# Patient Record
Sex: MALE | Race: WHITE | Employment: OTHER | URBAN - METROPOLITAN AREA
[De-identification: names, ages, dates, MRNs, and addresses within clinical notes are randomized per-mention and may not be internally consistent; named-entity substitution may affect disease eponyms.]

---

## 2017-10-05 ENCOUNTER — HOME HEALTH ADMISSION (OUTPATIENT)
Dept: HOME HEALTH SERVICES | Facility: HOME HEALTH | Age: 67
End: 2017-10-05
Payer: MEDICARE

## 2017-10-07 ENCOUNTER — HOME CARE VISIT (OUTPATIENT)
Dept: HOME HEALTH SERVICES | Facility: HOME HEALTH | Age: 67
End: 2017-10-07

## 2017-10-10 ENCOUNTER — HOME CARE VISIT (OUTPATIENT)
Dept: SCHEDULING | Facility: HOME HEALTH | Age: 67
End: 2017-10-10
Payer: MEDICARE

## 2017-10-10 VITALS
HEART RATE: 63 BPM | OXYGEN SATURATION: 95 % | DIASTOLIC BLOOD PRESSURE: 67 MMHG | RESPIRATION RATE: 16 BRPM | SYSTOLIC BLOOD PRESSURE: 136 MMHG

## 2017-10-10 PROCEDURE — 400013 HH SOC

## 2017-10-10 PROCEDURE — 3331090001 HH PPS REVENUE CREDIT

## 2017-10-10 PROCEDURE — G0151 HHCP-SERV OF PT,EA 15 MIN: HCPCS

## 2017-10-10 PROCEDURE — 3331090002 HH PPS REVENUE DEBIT

## 2017-10-11 ENCOUNTER — HOME CARE VISIT (OUTPATIENT)
Dept: SCHEDULING | Facility: HOME HEALTH | Age: 67
End: 2017-10-11
Payer: MEDICARE

## 2017-10-11 VITALS
OXYGEN SATURATION: 94 % | SYSTOLIC BLOOD PRESSURE: 128 MMHG | TEMPERATURE: 98.2 F | HEART RATE: 72 BPM | RESPIRATION RATE: 16 BRPM | DIASTOLIC BLOOD PRESSURE: 68 MMHG

## 2017-10-11 PROCEDURE — 3331090002 HH PPS REVENUE DEBIT

## 2017-10-11 PROCEDURE — G0153 HHCP-SVS OF S/L PATH,EA 15MN: HCPCS

## 2017-10-11 PROCEDURE — 3331090001 HH PPS REVENUE CREDIT

## 2017-10-12 ENCOUNTER — HOME CARE VISIT (OUTPATIENT)
Dept: SCHEDULING | Facility: HOME HEALTH | Age: 67
End: 2017-10-12
Payer: MEDICARE

## 2017-10-12 PROCEDURE — 3331090002 HH PPS REVENUE DEBIT

## 2017-10-12 PROCEDURE — 3331090001 HH PPS REVENUE CREDIT

## 2017-10-12 PROCEDURE — G0157 HHC PT ASSISTANT EA 15: HCPCS

## 2017-10-13 ENCOUNTER — HOME CARE VISIT (OUTPATIENT)
Dept: SCHEDULING | Facility: HOME HEALTH | Age: 67
End: 2017-10-13
Payer: MEDICARE

## 2017-10-13 VITALS
SYSTOLIC BLOOD PRESSURE: 120 MMHG | OXYGEN SATURATION: 97 % | TEMPERATURE: 96.5 F | RESPIRATION RATE: 18 BRPM | HEART RATE: 68 BPM | DIASTOLIC BLOOD PRESSURE: 72 MMHG

## 2017-10-13 PROCEDURE — 3331090001 HH PPS REVENUE CREDIT

## 2017-10-13 PROCEDURE — 3331090002 HH PPS REVENUE DEBIT

## 2017-10-13 PROCEDURE — G0152 HHCP-SERV OF OT,EA 15 MIN: HCPCS

## 2017-10-14 PROCEDURE — 3331090002 HH PPS REVENUE DEBIT

## 2017-10-14 PROCEDURE — 3331090001 HH PPS REVENUE CREDIT

## 2017-10-15 VITALS
RESPIRATION RATE: 16 BRPM | SYSTOLIC BLOOD PRESSURE: 132 MMHG | DIASTOLIC BLOOD PRESSURE: 78 MMHG | TEMPERATURE: 97.2 F | HEART RATE: 78 BPM

## 2017-10-15 PROCEDURE — 3331090001 HH PPS REVENUE CREDIT

## 2017-10-15 PROCEDURE — 3331090002 HH PPS REVENUE DEBIT

## 2017-10-16 ENCOUNTER — HOME CARE VISIT (OUTPATIENT)
Dept: SCHEDULING | Facility: HOME HEALTH | Age: 67
End: 2017-10-16
Payer: MEDICARE

## 2017-10-16 VITALS
TEMPERATURE: 98.2 F | OXYGEN SATURATION: 97 % | SYSTOLIC BLOOD PRESSURE: 132 MMHG | DIASTOLIC BLOOD PRESSURE: 70 MMHG | RESPIRATION RATE: 16 BRPM | HEART RATE: 72 BPM

## 2017-10-16 PROBLEM — E78.2 MIXED HYPERLIPIDEMIA: Status: ACTIVE | Noted: 2017-09-06

## 2017-10-16 PROBLEM — B37.0 ORAL CANDIDIASIS: Status: ACTIVE | Noted: 2017-09-13

## 2017-10-16 PROBLEM — E11.00 UNCONTROLLED TYPE 2 DIABETES MELLITUS WITH HYPEROSMOLARITY WITHOUT COMA, WITH LONG-TERM CURRENT USE OF INSULIN (HCC): Status: ACTIVE | Noted: 2017-10-16

## 2017-10-16 PROBLEM — I69.391 DYSPHAGIA DUE TO RECENT STROKE: Status: ACTIVE | Noted: 2017-09-08

## 2017-10-16 PROBLEM — R47.1 DYSARTHRIA: Status: ACTIVE | Noted: 2017-09-05

## 2017-10-16 PROBLEM — I63.9 ISCHEMIC STROKE (HCC): Status: ACTIVE | Noted: 2017-10-16

## 2017-10-16 PROBLEM — I63.9 CVA (CEREBRAL VASCULAR ACCIDENT) (HCC): Status: ACTIVE | Noted: 2017-09-05

## 2017-10-16 PROBLEM — I10 ESSENTIAL HYPERTENSION: Status: ACTIVE | Noted: 2017-09-05

## 2017-10-16 PROBLEM — Z79.4 UNCONTROLLED TYPE 2 DIABETES MELLITUS WITH HYPEROSMOLARITY WITHOUT COMA, WITH LONG-TERM CURRENT USE OF INSULIN (HCC): Status: ACTIVE | Noted: 2017-10-16

## 2017-10-16 PROBLEM — F48.2 PSEUDOBULBAR AFFECT: Status: ACTIVE | Noted: 2017-09-06

## 2017-10-16 PROBLEM — E11.65 TYPE 2 DIABETES MELLITUS WITH HYPERGLYCEMIA, WITHOUT LONG-TERM CURRENT USE OF INSULIN (HCC): Status: ACTIVE | Noted: 2017-09-05

## 2017-10-16 PROCEDURE — 3331090002 HH PPS REVENUE DEBIT

## 2017-10-16 PROCEDURE — G0153 HHCP-SVS OF S/L PATH,EA 15MN: HCPCS

## 2017-10-16 PROCEDURE — 3331090001 HH PPS REVENUE CREDIT

## 2017-10-17 ENCOUNTER — HOME CARE VISIT (OUTPATIENT)
Dept: SCHEDULING | Facility: HOME HEALTH | Age: 67
End: 2017-10-17
Payer: MEDICARE

## 2017-10-17 VITALS
TEMPERATURE: 98.5 F | DIASTOLIC BLOOD PRESSURE: 89 MMHG | SYSTOLIC BLOOD PRESSURE: 130 MMHG | OXYGEN SATURATION: 97 % | HEART RATE: 64 BPM | RESPIRATION RATE: 18 BRPM

## 2017-10-17 PROCEDURE — 3331090002 HH PPS REVENUE DEBIT

## 2017-10-17 PROCEDURE — G0157 HHC PT ASSISTANT EA 15: HCPCS

## 2017-10-17 PROCEDURE — 3331090001 HH PPS REVENUE CREDIT

## 2017-10-17 PROCEDURE — G0158 HHC OT ASSISTANT EA 15: HCPCS

## 2017-10-18 ENCOUNTER — HOME CARE VISIT (OUTPATIENT)
Dept: SCHEDULING | Facility: HOME HEALTH | Age: 67
End: 2017-10-18
Payer: MEDICARE

## 2017-10-18 VITALS
SYSTOLIC BLOOD PRESSURE: 124 MMHG | TEMPERATURE: 98.4 F | RESPIRATION RATE: 18 BRPM | DIASTOLIC BLOOD PRESSURE: 76 MMHG | HEART RATE: 76 BPM | OXYGEN SATURATION: 96 %

## 2017-10-18 PROCEDURE — G0153 HHCP-SVS OF S/L PATH,EA 15MN: HCPCS

## 2017-10-18 PROCEDURE — 3331090001 HH PPS REVENUE CREDIT

## 2017-10-18 PROCEDURE — 3331090002 HH PPS REVENUE DEBIT

## 2017-10-19 ENCOUNTER — HOME CARE VISIT (OUTPATIENT)
Dept: SCHEDULING | Facility: HOME HEALTH | Age: 67
End: 2017-10-19
Payer: MEDICARE

## 2017-10-19 VITALS
TEMPERATURE: 98.6 F | HEART RATE: 62 BPM | OXYGEN SATURATION: 98 % | SYSTOLIC BLOOD PRESSURE: 139 MMHG | RESPIRATION RATE: 17 BRPM | DIASTOLIC BLOOD PRESSURE: 72 MMHG

## 2017-10-19 VITALS
TEMPERATURE: 97.2 F | SYSTOLIC BLOOD PRESSURE: 132 MMHG | RESPIRATION RATE: 18 BRPM | HEART RATE: 78 BPM | DIASTOLIC BLOOD PRESSURE: 78 MMHG

## 2017-10-19 PROCEDURE — 3331090002 HH PPS REVENUE DEBIT

## 2017-10-19 PROCEDURE — 3331090001 HH PPS REVENUE CREDIT

## 2017-10-19 PROCEDURE — G0158 HHC OT ASSISTANT EA 15: HCPCS

## 2017-10-20 ENCOUNTER — HOME CARE VISIT (OUTPATIENT)
Dept: SCHEDULING | Facility: HOME HEALTH | Age: 67
End: 2017-10-20
Payer: MEDICARE

## 2017-10-20 PROCEDURE — 3331090002 HH PPS REVENUE DEBIT

## 2017-10-20 PROCEDURE — G0157 HHC PT ASSISTANT EA 15: HCPCS

## 2017-10-20 PROCEDURE — 3331090001 HH PPS REVENUE CREDIT

## 2017-10-21 PROCEDURE — 3331090001 HH PPS REVENUE CREDIT

## 2017-10-21 PROCEDURE — 3331090002 HH PPS REVENUE DEBIT

## 2017-10-22 VITALS
RESPIRATION RATE: 18 BRPM | DIASTOLIC BLOOD PRESSURE: 78 MMHG | TEMPERATURE: 97.3 F | HEART RATE: 78 BPM | SYSTOLIC BLOOD PRESSURE: 128 MMHG

## 2017-10-22 PROCEDURE — 3331090001 HH PPS REVENUE CREDIT

## 2017-10-22 PROCEDURE — 3331090002 HH PPS REVENUE DEBIT

## 2017-10-23 ENCOUNTER — HOME CARE VISIT (OUTPATIENT)
Dept: SCHEDULING | Facility: HOME HEALTH | Age: 67
End: 2017-10-23
Payer: MEDICARE

## 2017-10-23 VITALS
TEMPERATURE: 98.4 F | OXYGEN SATURATION: 97 % | DIASTOLIC BLOOD PRESSURE: 87 MMHG | RESPIRATION RATE: 17 BRPM | SYSTOLIC BLOOD PRESSURE: 141 MMHG | HEART RATE: 80 BPM

## 2017-10-23 PROCEDURE — G0158 HHC OT ASSISTANT EA 15: HCPCS

## 2017-10-23 PROCEDURE — 3331090002 HH PPS REVENUE DEBIT

## 2017-10-23 PROCEDURE — 3331090001 HH PPS REVENUE CREDIT

## 2017-10-24 ENCOUNTER — HOME CARE VISIT (OUTPATIENT)
Dept: SCHEDULING | Facility: HOME HEALTH | Age: 67
End: 2017-10-24
Payer: MEDICARE

## 2017-10-24 VITALS
DIASTOLIC BLOOD PRESSURE: 68 MMHG | TEMPERATURE: 98.3 F | HEART RATE: 72 BPM | SYSTOLIC BLOOD PRESSURE: 132 MMHG | RESPIRATION RATE: 16 BRPM | OXYGEN SATURATION: 97 %

## 2017-10-24 PROCEDURE — G0153 HHCP-SVS OF S/L PATH,EA 15MN: HCPCS

## 2017-10-24 PROCEDURE — G0151 HHCP-SERV OF PT,EA 15 MIN: HCPCS

## 2017-10-24 PROCEDURE — 3331090002 HH PPS REVENUE DEBIT

## 2017-10-24 PROCEDURE — 3331090001 HH PPS REVENUE CREDIT

## 2017-10-25 VITALS
OXYGEN SATURATION: 97 % | DIASTOLIC BLOOD PRESSURE: 76 MMHG | HEART RATE: 84 BPM | RESPIRATION RATE: 18 BRPM | SYSTOLIC BLOOD PRESSURE: 140 MMHG | TEMPERATURE: 97.6 F

## 2017-10-25 PROCEDURE — 3331090002 HH PPS REVENUE DEBIT

## 2017-10-25 PROCEDURE — 3331090001 HH PPS REVENUE CREDIT

## 2017-10-26 ENCOUNTER — HOME CARE VISIT (OUTPATIENT)
Dept: SCHEDULING | Facility: HOME HEALTH | Age: 67
End: 2017-10-26
Payer: MEDICARE

## 2017-10-26 VITALS
DIASTOLIC BLOOD PRESSURE: 70 MMHG | RESPIRATION RATE: 16 BRPM | OXYGEN SATURATION: 96 % | TEMPERATURE: 98.4 F | HEART RATE: 78 BPM | SYSTOLIC BLOOD PRESSURE: 138 MMHG

## 2017-10-26 PROCEDURE — G0153 HHCP-SVS OF S/L PATH,EA 15MN: HCPCS

## 2017-10-26 PROCEDURE — 3331090001 HH PPS REVENUE CREDIT

## 2017-10-26 PROCEDURE — 3331090002 HH PPS REVENUE DEBIT

## 2017-10-26 PROCEDURE — G0151 HHCP-SERV OF PT,EA 15 MIN: HCPCS

## 2017-10-27 ENCOUNTER — HOME CARE VISIT (OUTPATIENT)
Dept: SCHEDULING | Facility: HOME HEALTH | Age: 67
End: 2017-10-27
Payer: MEDICARE

## 2017-10-27 VITALS
OXYGEN SATURATION: 96 % | HEART RATE: 69 BPM | DIASTOLIC BLOOD PRESSURE: 86 MMHG | TEMPERATURE: 98.5 F | RESPIRATION RATE: 18 BRPM | SYSTOLIC BLOOD PRESSURE: 125 MMHG

## 2017-10-27 PROCEDURE — 3331090001 HH PPS REVENUE CREDIT

## 2017-10-27 PROCEDURE — G0158 HHC OT ASSISTANT EA 15: HCPCS

## 2017-10-27 PROCEDURE — 3331090002 HH PPS REVENUE DEBIT

## 2017-10-28 PROCEDURE — 3331090002 HH PPS REVENUE DEBIT

## 2017-10-28 PROCEDURE — 3331090001 HH PPS REVENUE CREDIT

## 2017-10-29 VITALS
OXYGEN SATURATION: 98 % | DIASTOLIC BLOOD PRESSURE: 70 MMHG | HEART RATE: 85 BPM | TEMPERATURE: 97.6 F | RESPIRATION RATE: 18 BRPM | SYSTOLIC BLOOD PRESSURE: 142 MMHG

## 2017-10-29 PROCEDURE — 3331090001 HH PPS REVENUE CREDIT

## 2017-10-29 PROCEDURE — 3331090002 HH PPS REVENUE DEBIT

## 2017-10-30 ENCOUNTER — HOME CARE VISIT (OUTPATIENT)
Dept: SCHEDULING | Facility: HOME HEALTH | Age: 67
End: 2017-10-30
Payer: MEDICARE

## 2017-10-30 VITALS
OXYGEN SATURATION: 95 % | HEART RATE: 96 BPM | SYSTOLIC BLOOD PRESSURE: 135 MMHG | DIASTOLIC BLOOD PRESSURE: 74 MMHG | RESPIRATION RATE: 17 BRPM | TEMPERATURE: 98.4 F

## 2017-10-30 PROCEDURE — G0158 HHC OT ASSISTANT EA 15: HCPCS

## 2017-10-30 PROCEDURE — 3331090001 HH PPS REVENUE CREDIT

## 2017-10-30 PROCEDURE — 3331090002 HH PPS REVENUE DEBIT

## 2017-10-31 ENCOUNTER — HOME CARE VISIT (OUTPATIENT)
Dept: SCHEDULING | Facility: HOME HEALTH | Age: 67
End: 2017-10-31
Payer: MEDICARE

## 2017-10-31 VITALS
SYSTOLIC BLOOD PRESSURE: 138 MMHG | DIASTOLIC BLOOD PRESSURE: 80 MMHG | OXYGEN SATURATION: 97 % | RESPIRATION RATE: 18 BRPM | TEMPERATURE: 98.4 F | HEART RATE: 72 BPM

## 2017-10-31 VITALS
HEART RATE: 87 BPM | TEMPERATURE: 97.6 F | SYSTOLIC BLOOD PRESSURE: 130 MMHG | DIASTOLIC BLOOD PRESSURE: 82 MMHG | OXYGEN SATURATION: 98 % | RESPIRATION RATE: 18 BRPM

## 2017-10-31 PROCEDURE — G0153 HHCP-SVS OF S/L PATH,EA 15MN: HCPCS

## 2017-10-31 PROCEDURE — 3331090001 HH PPS REVENUE CREDIT

## 2017-10-31 PROCEDURE — 3331090002 HH PPS REVENUE DEBIT

## 2017-10-31 PROCEDURE — G0151 HHCP-SERV OF PT,EA 15 MIN: HCPCS

## 2017-11-01 ENCOUNTER — HOME CARE VISIT (OUTPATIENT)
Dept: HOME HEALTH SERVICES | Facility: HOME HEALTH | Age: 67
End: 2017-11-01
Payer: MEDICARE

## 2017-11-01 PROCEDURE — 3331090001 HH PPS REVENUE CREDIT

## 2017-11-01 PROCEDURE — 3331090002 HH PPS REVENUE DEBIT

## 2017-11-02 ENCOUNTER — HOME CARE VISIT (OUTPATIENT)
Dept: SCHEDULING | Facility: HOME HEALTH | Age: 67
End: 2017-11-02
Payer: MEDICARE

## 2017-11-02 VITALS
DIASTOLIC BLOOD PRESSURE: 80 MMHG | RESPIRATION RATE: 18 BRPM | HEART RATE: 84 BPM | SYSTOLIC BLOOD PRESSURE: 140 MMHG | OXYGEN SATURATION: 98 % | TEMPERATURE: 97.8 F

## 2017-11-02 VITALS
SYSTOLIC BLOOD PRESSURE: 132 MMHG | TEMPERATURE: 98.4 F | DIASTOLIC BLOOD PRESSURE: 82 MMHG | HEART RATE: 78 BPM | OXYGEN SATURATION: 95 % | RESPIRATION RATE: 16 BRPM

## 2017-11-02 PROCEDURE — 3331090001 HH PPS REVENUE CREDIT

## 2017-11-02 PROCEDURE — G0151 HHCP-SERV OF PT,EA 15 MIN: HCPCS

## 2017-11-02 PROCEDURE — G0153 HHCP-SVS OF S/L PATH,EA 15MN: HCPCS

## 2017-11-02 PROCEDURE — 3331090002 HH PPS REVENUE DEBIT

## 2017-11-03 ENCOUNTER — HOME CARE VISIT (OUTPATIENT)
Dept: SCHEDULING | Facility: HOME HEALTH | Age: 67
End: 2017-11-03
Payer: MEDICARE

## 2017-11-03 VITALS
SYSTOLIC BLOOD PRESSURE: 129 MMHG | DIASTOLIC BLOOD PRESSURE: 84 MMHG | TEMPERATURE: 98.4 F | RESPIRATION RATE: 17 BRPM | OXYGEN SATURATION: 94 % | HEART RATE: 81 BPM

## 2017-11-03 PROCEDURE — G0158 HHC OT ASSISTANT EA 15: HCPCS

## 2017-11-03 PROCEDURE — 3331090002 HH PPS REVENUE DEBIT

## 2017-11-03 PROCEDURE — 3331090001 HH PPS REVENUE CREDIT

## 2017-11-04 PROCEDURE — 3331090001 HH PPS REVENUE CREDIT

## 2017-11-04 PROCEDURE — 3331090002 HH PPS REVENUE DEBIT

## 2017-11-05 PROCEDURE — 3331090001 HH PPS REVENUE CREDIT

## 2017-11-05 PROCEDURE — 3331090002 HH PPS REVENUE DEBIT

## 2017-11-06 ENCOUNTER — HOME CARE VISIT (OUTPATIENT)
Dept: SCHEDULING | Facility: HOME HEALTH | Age: 67
End: 2017-11-06
Payer: MEDICARE

## 2017-11-06 VITALS
OXYGEN SATURATION: 97 % | RESPIRATION RATE: 16 BRPM | SYSTOLIC BLOOD PRESSURE: 132 MMHG | TEMPERATURE: 98.5 F | HEART RATE: 74 BPM | DIASTOLIC BLOOD PRESSURE: 72 MMHG

## 2017-11-06 PROCEDURE — 3331090001 HH PPS REVENUE CREDIT

## 2017-11-06 PROCEDURE — G0153 HHCP-SVS OF S/L PATH,EA 15MN: HCPCS

## 2017-11-06 PROCEDURE — 3331090002 HH PPS REVENUE DEBIT

## 2017-11-07 ENCOUNTER — HOME CARE VISIT (OUTPATIENT)
Dept: SCHEDULING | Facility: HOME HEALTH | Age: 67
End: 2017-11-07
Payer: MEDICARE

## 2017-11-07 VITALS
HEART RATE: 80 BPM | TEMPERATURE: 98.1 F | RESPIRATION RATE: 16 BRPM | DIASTOLIC BLOOD PRESSURE: 70 MMHG | OXYGEN SATURATION: 95 % | SYSTOLIC BLOOD PRESSURE: 140 MMHG

## 2017-11-07 PROCEDURE — 3331090002 HH PPS REVENUE DEBIT

## 2017-11-07 PROCEDURE — G0152 HHCP-SERV OF OT,EA 15 MIN: HCPCS

## 2017-11-07 PROCEDURE — 3331090001 HH PPS REVENUE CREDIT

## 2017-11-08 ENCOUNTER — HOME CARE VISIT (OUTPATIENT)
Dept: SCHEDULING | Facility: HOME HEALTH | Age: 67
End: 2017-11-08
Payer: MEDICARE

## 2017-11-08 VITALS
TEMPERATURE: 97.8 F | DIASTOLIC BLOOD PRESSURE: 80 MMHG | RESPIRATION RATE: 18 BRPM | OXYGEN SATURATION: 98 % | SYSTOLIC BLOOD PRESSURE: 138 MMHG | HEART RATE: 73 BPM

## 2017-11-08 PROCEDURE — 3331090001 HH PPS REVENUE CREDIT

## 2017-11-08 PROCEDURE — G0151 HHCP-SERV OF PT,EA 15 MIN: HCPCS

## 2017-11-08 PROCEDURE — 3331090002 HH PPS REVENUE DEBIT

## 2017-11-09 PROCEDURE — 3331090001 HH PPS REVENUE CREDIT

## 2017-11-09 PROCEDURE — 3331090002 HH PPS REVENUE DEBIT

## 2017-11-10 PROCEDURE — 3331090002 HH PPS REVENUE DEBIT

## 2017-11-10 PROCEDURE — 3331090001 HH PPS REVENUE CREDIT

## 2017-11-11 PROCEDURE — 3331090001 HH PPS REVENUE CREDIT

## 2017-11-11 PROCEDURE — 3331090002 HH PPS REVENUE DEBIT

## 2017-11-12 PROCEDURE — 3331090002 HH PPS REVENUE DEBIT

## 2017-11-12 PROCEDURE — 3331090001 HH PPS REVENUE CREDIT

## 2017-11-13 PROCEDURE — 3331090002 HH PPS REVENUE DEBIT

## 2017-11-13 PROCEDURE — 3331090001 HH PPS REVENUE CREDIT

## 2017-11-14 PROCEDURE — 3331090002 HH PPS REVENUE DEBIT

## 2017-11-14 PROCEDURE — 3331090001 HH PPS REVENUE CREDIT

## 2017-11-15 ENCOUNTER — HOME CARE VISIT (OUTPATIENT)
Dept: SCHEDULING | Facility: HOME HEALTH | Age: 67
End: 2017-11-15
Payer: MEDICARE

## 2017-11-15 PROCEDURE — 3331090002 HH PPS REVENUE DEBIT

## 2017-11-15 PROCEDURE — G0151 HHCP-SERV OF PT,EA 15 MIN: HCPCS

## 2017-11-15 PROCEDURE — 3331090001 HH PPS REVENUE CREDIT

## 2017-11-16 VITALS
OXYGEN SATURATION: 98 % | RESPIRATION RATE: 18 BRPM | SYSTOLIC BLOOD PRESSURE: 128 MMHG | DIASTOLIC BLOOD PRESSURE: 80 MMHG | TEMPERATURE: 97.6 F | HEART RATE: 79 BPM

## 2017-11-16 PROCEDURE — 3331090001 HH PPS REVENUE CREDIT

## 2017-11-16 PROCEDURE — 3331090002 HH PPS REVENUE DEBIT

## 2017-11-17 PROCEDURE — 3331090001 HH PPS REVENUE CREDIT

## 2017-11-17 PROCEDURE — 3331090002 HH PPS REVENUE DEBIT

## 2017-11-18 PROCEDURE — 3331090002 HH PPS REVENUE DEBIT

## 2017-11-18 PROCEDURE — 3331090001 HH PPS REVENUE CREDIT

## 2017-11-19 PROCEDURE — 3331090001 HH PPS REVENUE CREDIT

## 2017-11-19 PROCEDURE — 3331090002 HH PPS REVENUE DEBIT

## 2017-11-20 PROCEDURE — 3331090002 HH PPS REVENUE DEBIT

## 2017-11-20 PROCEDURE — 3331090001 HH PPS REVENUE CREDIT

## 2017-11-21 PROCEDURE — 3331090001 HH PPS REVENUE CREDIT

## 2017-11-21 PROCEDURE — 3331090002 HH PPS REVENUE DEBIT

## 2017-11-22 ENCOUNTER — HOME CARE VISIT (OUTPATIENT)
Dept: SCHEDULING | Facility: HOME HEALTH | Age: 67
End: 2017-11-22
Payer: MEDICARE

## 2017-11-22 VITALS
OXYGEN SATURATION: 98 % | SYSTOLIC BLOOD PRESSURE: 136 MMHG | TEMPERATURE: 97.6 F | RESPIRATION RATE: 18 BRPM | DIASTOLIC BLOOD PRESSURE: 80 MMHG | HEART RATE: 83 BPM

## 2017-11-22 PROCEDURE — G0151 HHCP-SERV OF PT,EA 15 MIN: HCPCS

## 2017-11-22 PROCEDURE — 3331090001 HH PPS REVENUE CREDIT

## 2017-11-22 PROCEDURE — 3331090002 HH PPS REVENUE DEBIT

## 2017-11-23 PROCEDURE — 3331090001 HH PPS REVENUE CREDIT

## 2017-11-23 PROCEDURE — 3331090002 HH PPS REVENUE DEBIT

## 2017-11-24 PROCEDURE — 3331090001 HH PPS REVENUE CREDIT

## 2017-11-24 PROCEDURE — 3331090002 HH PPS REVENUE DEBIT

## 2017-11-25 PROCEDURE — 3331090001 HH PPS REVENUE CREDIT

## 2017-11-25 PROCEDURE — 3331090002 HH PPS REVENUE DEBIT

## 2017-11-26 PROCEDURE — 3331090002 HH PPS REVENUE DEBIT

## 2017-11-26 PROCEDURE — 3331090001 HH PPS REVENUE CREDIT

## 2017-11-27 PROCEDURE — 3331090002 HH PPS REVENUE DEBIT

## 2017-11-27 PROCEDURE — 3331090001 HH PPS REVENUE CREDIT

## 2017-11-28 PROCEDURE — 3331090002 HH PPS REVENUE DEBIT

## 2017-11-28 PROCEDURE — 3331090001 HH PPS REVENUE CREDIT

## 2017-11-29 PROCEDURE — 3331090002 HH PPS REVENUE DEBIT

## 2017-11-29 PROCEDURE — 3331090001 HH PPS REVENUE CREDIT

## 2021-08-03 PROBLEM — I63.9 CVA (CEREBRAL VASCULAR ACCIDENT) (HCC): Status: RESOLVED | Noted: 2017-09-05 | Resolved: 2021-08-03

## 2021-08-03 PROBLEM — I63.9 STROKE (CEREBRUM) (HCC): Status: RESOLVED | Noted: 2021-08-03 | Resolved: 2021-08-03

## 2022-03-02 PROBLEM — F32.0 CURRENT MILD EPISODE OF MAJOR DEPRESSIVE DISORDER WITHOUT PRIOR EPISODE (HCC): Status: ACTIVE | Noted: 2022-03-02

## 2022-03-02 PROBLEM — I63.212 CEREBROVASCULAR ACCIDENT (CVA) DUE TO OCCLUSION OF LEFT VERTEBRAL ARTERY (HCC): Status: ACTIVE | Noted: 2021-08-03

## 2022-03-18 PROBLEM — B37.0 ORAL CANDIDIASIS: Status: ACTIVE | Noted: 2017-09-13

## 2022-03-18 PROBLEM — I63.212 CEREBROVASCULAR ACCIDENT (CVA) DUE TO OCCLUSION OF LEFT VERTEBRAL ARTERY (HCC): Status: ACTIVE | Noted: 2021-08-03

## 2022-03-19 PROBLEM — I63.9 ISCHEMIC STROKE (HCC): Status: ACTIVE | Noted: 2017-10-16

## 2022-03-19 PROBLEM — E78.2 MIXED HYPERLIPIDEMIA: Status: ACTIVE | Noted: 2017-09-06

## 2022-03-19 PROBLEM — I69.391 DYSPHAGIA DUE TO RECENT STROKE: Status: ACTIVE | Noted: 2017-09-08

## 2022-03-19 PROBLEM — E11.65 TYPE 2 DIABETES MELLITUS WITH HYPERGLYCEMIA, WITHOUT LONG-TERM CURRENT USE OF INSULIN (HCC): Status: ACTIVE | Noted: 2017-09-05

## 2022-03-19 PROBLEM — F48.2 PSEUDOBULBAR AFFECT: Status: ACTIVE | Noted: 2017-09-06

## 2022-03-19 PROBLEM — F32.0 CURRENT MILD EPISODE OF MAJOR DEPRESSIVE DISORDER WITHOUT PRIOR EPISODE (HCC): Status: ACTIVE | Noted: 2022-03-02

## 2022-03-19 PROBLEM — I10 ESSENTIAL HYPERTENSION: Status: ACTIVE | Noted: 2017-09-05

## 2022-03-19 PROBLEM — R47.1 DYSARTHRIA: Status: ACTIVE | Noted: 2017-09-05

## 2022-03-20 PROBLEM — E11.00 UNCONTROLLED TYPE 2 DIABETES MELLITUS WITH HYPEROSMOLARITY WITHOUT COMA, WITH LONG-TERM CURRENT USE OF INSULIN (HCC): Status: ACTIVE | Noted: 2017-10-16

## 2022-03-20 PROBLEM — Z79.4 UNCONTROLLED TYPE 2 DIABETES MELLITUS WITH HYPEROSMOLARITY WITHOUT COMA, WITH LONG-TERM CURRENT USE OF INSULIN (HCC): Status: ACTIVE | Noted: 2017-10-16

## 2022-04-12 NOTE — PROGRESS NOTES
No significant findings on CT scan. If memory and cognitive processing are enough of a concern, we can start medicine for this. My recommendation would be to give the B12 replacement a few months to see how he responds to that.

## 2022-06-29 ENCOUNTER — OFFICE VISIT (OUTPATIENT)
Dept: FAMILY MEDICINE CLINIC | Facility: CLINIC | Age: 72
End: 2022-06-29
Payer: MEDICARE

## 2022-06-29 VITALS
SYSTOLIC BLOOD PRESSURE: 120 MMHG | HEIGHT: 68 IN | DIASTOLIC BLOOD PRESSURE: 70 MMHG | WEIGHT: 198 LBS | BODY MASS INDEX: 30.01 KG/M2

## 2022-06-29 DIAGNOSIS — I10 ESSENTIAL HYPERTENSION: ICD-10-CM

## 2022-06-29 DIAGNOSIS — E11.00 UNCONTROLLED TYPE 2 DIABETES MELLITUS WITH HYPEROSMOLARITY WITHOUT COMA, WITH LONG-TERM CURRENT USE OF INSULIN (HCC): ICD-10-CM

## 2022-06-29 DIAGNOSIS — I69.391 DYSPHAGIA DUE TO RECENT STROKE: ICD-10-CM

## 2022-06-29 DIAGNOSIS — I63.9 ISCHEMIC STROKE (HCC): ICD-10-CM

## 2022-06-29 DIAGNOSIS — F32.0 CURRENT MILD EPISODE OF MAJOR DEPRESSIVE DISORDER WITHOUT PRIOR EPISODE (HCC): Primary | ICD-10-CM

## 2022-06-29 DIAGNOSIS — Z79.4 UNCONTROLLED TYPE 2 DIABETES MELLITUS WITH HYPEROSMOLARITY WITHOUT COMA, WITH LONG-TERM CURRENT USE OF INSULIN (HCC): ICD-10-CM

## 2022-06-29 DIAGNOSIS — N18.31 STAGE 3A CHRONIC KIDNEY DISEASE (HCC): ICD-10-CM

## 2022-06-29 DIAGNOSIS — E78.2 MIXED HYPERLIPIDEMIA: ICD-10-CM

## 2022-06-29 PROBLEM — N18.30 CHRONIC RENAL DISEASE, STAGE III (HCC): Status: ACTIVE | Noted: 2022-06-29

## 2022-06-29 PROCEDURE — 2022F DILAT RTA XM EVC RTNOPTHY: CPT | Performed by: FAMILY MEDICINE

## 2022-06-29 PROCEDURE — 99214 OFFICE O/P EST MOD 30 MIN: CPT | Performed by: FAMILY MEDICINE

## 2022-06-29 PROCEDURE — G8417 CALC BMI ABV UP PARAM F/U: HCPCS | Performed by: FAMILY MEDICINE

## 2022-06-29 PROCEDURE — 1036F TOBACCO NON-USER: CPT | Performed by: FAMILY MEDICINE

## 2022-06-29 PROCEDURE — 3051F HG A1C>EQUAL 7.0%<8.0%: CPT | Performed by: FAMILY MEDICINE

## 2022-06-29 PROCEDURE — 3017F COLORECTAL CA SCREEN DOC REV: CPT | Performed by: FAMILY MEDICINE

## 2022-06-29 PROCEDURE — G8427 DOCREV CUR MEDS BY ELIG CLIN: HCPCS | Performed by: FAMILY MEDICINE

## 2022-06-29 PROCEDURE — 1123F ACP DISCUSS/DSCN MKR DOCD: CPT | Performed by: FAMILY MEDICINE

## 2022-06-29 RX ORDER — CITALOPRAM 20 MG/1
20 TABLET ORAL DAILY
Qty: 90 TABLET | Refills: 1 | Status: SHIPPED | OUTPATIENT
Start: 2022-06-29 | End: 2022-10-27 | Stop reason: SDUPTHER

## 2022-06-29 RX ORDER — ATORVASTATIN CALCIUM 80 MG/1
80 TABLET, FILM COATED ORAL DAILY
Qty: 90 TABLET | Refills: 1 | Status: SHIPPED | OUTPATIENT
Start: 2022-06-29 | End: 2022-10-27 | Stop reason: SDUPTHER

## 2022-06-29 RX ORDER — LISINOPRIL AND HYDROCHLOROTHIAZIDE 12.5; 1 MG/1; MG/1
1 TABLET ORAL DAILY
Qty: 90 TABLET | Refills: 1 | Status: SHIPPED | OUTPATIENT
Start: 2022-06-29 | End: 2022-10-27

## 2022-06-29 ASSESSMENT — ANXIETY QUESTIONNAIRES
4. TROUBLE RELAXING: 0
1. FEELING NERVOUS, ANXIOUS, OR ON EDGE: 0
7. FEELING AFRAID AS IF SOMETHING AWFUL MIGHT HAPPEN: 0
3. WORRYING TOO MUCH ABOUT DIFFERENT THINGS: 0
IF YOU CHECKED OFF ANY PROBLEMS ON THIS QUESTIONNAIRE, HOW DIFFICULT HAVE THESE PROBLEMS MADE IT FOR YOU TO DO YOUR WORK, TAKE CARE OF THINGS AT HOME, OR GET ALONG WITH OTHER PEOPLE: NOT DIFFICULT AT ALL
2. NOT BEING ABLE TO STOP OR CONTROL WORRYING: 0
GAD7 TOTAL SCORE: 0
6. BECOMING EASILY ANNOYED OR IRRITABLE: 0
5. BEING SO RESTLESS THAT IT IS HARD TO SIT STILL: 0

## 2022-06-29 ASSESSMENT — PATIENT HEALTH QUESTIONNAIRE - PHQ9
10. IF YOU CHECKED OFF ANY PROBLEMS, HOW DIFFICULT HAVE THESE PROBLEMS MADE IT FOR YOU TO DO YOUR WORK, TAKE CARE OF THINGS AT HOME, OR GET ALONG WITH OTHER PEOPLE: 0
3. TROUBLE FALLING OR STAYING ASLEEP: 0
4. FEELING TIRED OR HAVING LITTLE ENERGY: 0
6. FEELING BAD ABOUT YOURSELF - OR THAT YOU ARE A FAILURE OR HAVE LET YOURSELF OR YOUR FAMILY DOWN: 0
5. POOR APPETITE OR OVEREATING: 0
8. MOVING OR SPEAKING SO SLOWLY THAT OTHER PEOPLE COULD HAVE NOTICED. OR THE OPPOSITE, BEING SO FIGETY OR RESTLESS THAT YOU HAVE BEEN MOVING AROUND A LOT MORE THAN USUAL: 0
SUM OF ALL RESPONSES TO PHQ QUESTIONS 1-9: 0
SUM OF ALL RESPONSES TO PHQ QUESTIONS 1-9: 0
SUM OF ALL RESPONSES TO PHQ9 QUESTIONS 1 & 2: 0
2. FEELING DOWN, DEPRESSED OR HOPELESS: 0
SUM OF ALL RESPONSES TO PHQ QUESTIONS 1-9: 0
1. LITTLE INTEREST OR PLEASURE IN DOING THINGS: 0
7. TROUBLE CONCENTRATING ON THINGS, SUCH AS READING THE NEWSPAPER OR WATCHING TELEVISION: 0
9. THOUGHTS THAT YOU WOULD BE BETTER OFF DEAD, OR OF HURTING YOURSELF: 0
SUM OF ALL RESPONSES TO PHQ QUESTIONS 1-9: 0

## 2022-06-29 ASSESSMENT — ENCOUNTER SYMPTOMS
RESPIRATORY NEGATIVE: 1
EYES NEGATIVE: 1
EYE ITCHING: 0
EYE DISCHARGE: 0
EYE PAIN: 0

## 2022-06-29 NOTE — PROGRESS NOTES
[address] [place] [address]  _______________________________________  Nesha Santos MD                 [address] [place] [address] Drive, [address]. MD Natalia                     [address], [place]                                                                                     Phone: [phone]                                                                                    Fax: [phone]    Lisa Holm is a 70 y.o. male who is seen for evaluation of   Chief Complaint   Patient presents with    Cholesterol Problem    Hypertension    Diabetes    Memory Loss       HPI:   Hypertension  This is a chronic problem. Episode onset: on meds, needs refills adnl abs, no side effect noted. good control at home. Diabetes  He presents for his follow-up diabetic visit. He has type 2 diabetes mellitus. Onset time: on meds, need refills and labs, no roberto eeffect noted. Hyperlipidemia  This is a chronic problem. Episode onset: on meds, need srefillsa dn albs fastign. Neurologic Problem  Chronicity: prior stroke, has issues with this right side weakness ongoign but no new issues noted. Pertinent negatives include no diaphoresis. Chief Complaint   Patient presents with    Cholesterol Problem    Hypertension    Diabetes    Memory Loss         Review of Systems:    Review of Systems   Constitutional: Negative for activity change, appetite change, chills and diaphoresis. HENT: Negative. Eyes: Negative. Negative for pain, discharge and itching. Respiratory: Negative. Endocrine: Negative. Genitourinary: Negative.         History:  Past Medical History:   Diagnosis Date    Diabetes ([address]     Stroke (cerebrum) [facility])     September 2017       Past Surgical History:   Procedure Laterality Date    COLONOSCOPY      2011, clear, 10 years       Family History   Problem Relation Age of Onset    Cancer Father     Stroke Mother        Social History     Tobacco Use    Smoking status: Never Smoker  Smokeless tobacco: Never Used   Substance Use Topics    Alcohol use: No       No Known Allergies    Current Outpatient Medications   Medication Sig Dispense Refill    lisinopril-hydroCHLOROthiazide (PRINZIDE;ZESTORETIC) 10-12.5 MG per tablet Take 1 tablet by mouth daily 90 tablet 1    metFORMIN (GLUCOPHAGE) 500 MG tablet Take 1 tablet by mouth 2 times daily (with meals) 180 tablet 1    SITagliptin (JANUVIA) 50 MG tablet Take 1 tablet by mouth daily 90 tablet 1    citalopram (CELEXA) 20 MG tablet Take 1 tablet by mouth daily 90 tablet 1    atorvastatin (LIPITOR) 80 MG tablet Take 1 tablet by mouth daily 90 tablet 1    aspirin 81 MG chewable tablet Take 162 mg by mouth 2 times daily      cyanocobalamin 1000 MCG/ML injection One injection once a week for four weeks, then one injection once a month for one year.  lisinopril (PRINIVIL;ZESTRIL) 2.5 MG tablet Take 1 tablet by mouth       No current facility-administered medications for this visit. Vitals:    /70   Ht 5' 8\" (1.727 m)   Wt 198 lb (89.8 kg)   BMI 30.11 kg/m²     Physical Exam:  Physical Exam  Vitals and nursing note reviewed. Constitutional:       Appearance: Normal appearance. He is not ill-appearing or diaphoretic. HENT:      Head: Normocephalic and atraumatic. Nose: Nose normal.   Eyes:      Pupils: Pupils are equal, round, and reactive to light. Cardiovascular:      Rate and Rhythm: Normal rate and regular rhythm. Pulses: Normal pulses. Heart sounds: Normal heart sounds. Pulmonary:      Effort: Pulmonary effort is normal.      Breath sounds: Normal breath sounds. Musculoskeletal:         General: No swelling or tenderness. Normal range of motion. Cervical back: Normal range of motion and neck supple. Skin:     General: Skin is warm and dry. Findings: No erythema or rash. Neurological:      General: No focal deficit present.       Mental Status: He is alert and oriented to person, place, and time. Mental status is at baseline. Motor: Weakness present. Coordination: Coordination abnormal.      Gait: Gait abnormal.   Psychiatric:         Mood and Affect: Mood normal.         Assessment/Plan:     ICD-10-CM    1. Current mild episode of major depressive disorder without prior episode ([facility])  F32.0 citalopram (CELEXA) 20 MG tablet   2. Stage 3a chronic kidney disease ([facility])  N18.31    3. [facility]schem[facility] stroke ([facility]  I63.9    4. Uncontrolled type 2 diabetes mellitus with hyperosmolarity without coma, with long-term current use of insulin ([facility])  E11.00 metFORMIN (GLUCOPHAGE) 500 MG tablet    Z79.4 SITagliptin (JANUVIA) 50 MG tablet     Hemoglobin A1C     Hemoglobin A1C   5. Dysphagia due to recent stroke  I69.391    6. Essential hypertension  I10 lisinopril-hydroCHLOROthiazide (PRINZIDE;ZESTORETIC) 10-12.5 MG per tablet     CBC with Auto Differential     Comprehensive Metabolic Panel     Comprehensive Metabolic Panel     CBC with Auto Differential   7.  Mixed hyperlipidemia  E78.2 atorvastatin (LIPITOR) 80 MG tablet     Lipid Panel     Lipid Panel     Meds sent  Labs sent  Encourage diet and exercise   Encourage weight loss,  Encourage home sugar monitoring  Call if problems  Recheck 3 months      Alyssa Murray MD

## 2022-06-30 LAB
ALBUMIN SERPL-MCNC: 4.2 G/DL (ref 3.2–4.6)
ALBUMIN/GLOB SERPL: 1.4 {RATIO} (ref 1.2–3.5)
ALP SERPL-CCNC: 76 U/L (ref 50–136)
ALT SERPL-CCNC: 38 U/L (ref 12–65)
ANION GAP SERPL CALC-SCNC: 7 MMOL/L (ref 7–16)
AST SERPL-CCNC: 20 U/L (ref 15–37)
BASOPHILS # BLD: 0.1 K/UL (ref 0–0.2)
BASOPHILS NFR BLD: 1 % (ref 0–2)
BILIRUB SERPL-MCNC: 0.4 MG/DL (ref 0.2–1.1)
BUN SERPL-MCNC: 23 MG/DL (ref 8–23)
CALCIUM SERPL-MCNC: 9.4 MG/DL (ref 8.3–10.4)
CHLORIDE SERPL-SCNC: 105 MMOL/L (ref 98–107)
CHOLEST SERPL-MCNC: 131 MG/DL
CO2 SERPL-SCNC: 28 MMOL/L (ref 21–32)
CREAT SERPL-MCNC: 1.7 MG/DL (ref 0.8–1.5)
DIFFERENTIAL METHOD BLD: NORMAL
EOSINOPHIL # BLD: 0.5 K/UL (ref 0–0.8)
EOSINOPHIL NFR BLD: 7 % (ref 0.5–7.8)
ERYTHROCYTE [DISTWIDTH] IN BLOOD BY AUTOMATED COUNT: 13.2 % (ref 11.9–14.6)
GLOBULIN SER CALC-MCNC: 2.9 G/DL (ref 2.3–3.5)
GLUCOSE SERPL-MCNC: 79 MG/DL (ref 65–100)
HCT VFR BLD AUTO: 43.3 % (ref 41.1–50.3)
HDLC SERPL-MCNC: 35 MG/DL (ref 40–60)
HDLC SERPL: 3.7 {RATIO}
HGB BLD-MCNC: 13.8 G/DL (ref 13.6–17.2)
IMM GRANULOCYTES # BLD AUTO: 0 K/UL (ref 0–0.5)
IMM GRANULOCYTES NFR BLD AUTO: 0 % (ref 0–5)
LDLC SERPL CALC-MCNC: 59.2 MG/DL
LYMPHOCYTES # BLD: 2.4 K/UL (ref 0.5–4.6)
LYMPHOCYTES NFR BLD: 30 % (ref 13–44)
MCH RBC QN AUTO: 30.2 PG (ref 26.1–32.9)
MCHC RBC AUTO-ENTMCNC: 31.9 G/DL (ref 31.4–35)
MCV RBC AUTO: 94.7 FL (ref 79.6–97.8)
MONOCYTES # BLD: 0.9 K/UL (ref 0.1–1.3)
MONOCYTES NFR BLD: 12 % (ref 4–12)
NEUTS SEG # BLD: 3.8 K/UL (ref 1.7–8.2)
NEUTS SEG NFR BLD: 50 % (ref 43–78)
NRBC # BLD: 0 K/UL (ref 0–0.2)
PLATELET # BLD AUTO: 278 K/UL (ref 150–450)
PMV BLD AUTO: 10.1 FL (ref 9.4–12.3)
POTASSIUM SERPL-SCNC: 5 MMOL/L (ref 3.5–5.1)
PROT SERPL-MCNC: 7.1 G/DL (ref 6.3–8.2)
RBC # BLD AUTO: 4.57 M/UL (ref 4.23–5.6)
SODIUM SERPL-SCNC: 140 MMOL/L (ref 136–145)
TRIGL SERPL-MCNC: 184 MG/DL (ref 35–150)
VLDLC SERPL CALC-MCNC: 36.8 MG/DL (ref 6–23)
WBC # BLD AUTO: 7.7 K/UL (ref 4.3–11.1)

## 2022-07-01 LAB
EST. AVERAGE GLUCOSE BLD GHB EST-MCNC: 146 MG/DL
HBA1C MFR BLD: 6.7 % (ref 4.2–6.3)

## 2022-10-27 ENCOUNTER — OFFICE VISIT (OUTPATIENT)
Dept: FAMILY MEDICINE CLINIC | Facility: CLINIC | Age: 72
End: 2022-10-27
Payer: MEDICARE

## 2022-10-27 VITALS
WEIGHT: 204 LBS | HEIGHT: 68 IN | SYSTOLIC BLOOD PRESSURE: 124 MMHG | BODY MASS INDEX: 30.92 KG/M2 | DIASTOLIC BLOOD PRESSURE: 70 MMHG

## 2022-10-27 DIAGNOSIS — N18.31 STAGE 3A CHRONIC KIDNEY DISEASE (HCC): ICD-10-CM

## 2022-10-27 DIAGNOSIS — I10 ESSENTIAL HYPERTENSION: ICD-10-CM

## 2022-10-27 DIAGNOSIS — E83.42 HYPOMAGNESEMIA: ICD-10-CM

## 2022-10-27 DIAGNOSIS — Z79.4 UNCONTROLLED TYPE 2 DIABETES MELLITUS WITH HYPEROSMOLARITY WITHOUT COMA, WITH LONG-TERM CURRENT USE OF INSULIN (HCC): ICD-10-CM

## 2022-10-27 DIAGNOSIS — F32.0 CURRENT MILD EPISODE OF MAJOR DEPRESSIVE DISORDER WITHOUT PRIOR EPISODE (HCC): Primary | ICD-10-CM

## 2022-10-27 DIAGNOSIS — F51.01 PRIMARY INSOMNIA: ICD-10-CM

## 2022-10-27 DIAGNOSIS — E78.2 MIXED HYPERLIPIDEMIA: ICD-10-CM

## 2022-10-27 DIAGNOSIS — F41.9 ANXIETY: ICD-10-CM

## 2022-10-27 DIAGNOSIS — I63.212: ICD-10-CM

## 2022-10-27 DIAGNOSIS — I63.9 ISCHEMIC STROKE (HCC): ICD-10-CM

## 2022-10-27 DIAGNOSIS — E11.00 UNCONTROLLED TYPE 2 DIABETES MELLITUS WITH HYPEROSMOLARITY WITHOUT COMA, WITH LONG-TERM CURRENT USE OF INSULIN (HCC): ICD-10-CM

## 2022-10-27 LAB
BASOPHILS # BLD: 0.1 K/UL (ref 0–0.2)
BASOPHILS NFR BLD: 1 % (ref 0–2)
DIFFERENTIAL METHOD BLD: ABNORMAL
EOSINOPHIL # BLD: 0.5 K/UL (ref 0–0.8)
EOSINOPHIL NFR BLD: 7 % (ref 0.5–7.8)
ERYTHROCYTE [DISTWIDTH] IN BLOOD BY AUTOMATED COUNT: 13.5 % (ref 11.9–14.6)
HCT VFR BLD AUTO: 41.6 % (ref 41.1–50.3)
HGB BLD-MCNC: 13.5 G/DL (ref 13.6–17.2)
IMM GRANULOCYTES # BLD AUTO: 0 K/UL (ref 0–0.5)
IMM GRANULOCYTES NFR BLD AUTO: 0 % (ref 0–5)
LYMPHOCYTES # BLD: 1.8 K/UL (ref 0.5–4.6)
LYMPHOCYTES NFR BLD: 24 % (ref 13–44)
MCH RBC QN AUTO: 30.6 PG (ref 26.1–32.9)
MCHC RBC AUTO-ENTMCNC: 32.5 G/DL (ref 31.4–35)
MCV RBC AUTO: 94.3 FL (ref 82–102)
MONOCYTES # BLD: 0.9 K/UL (ref 0.1–1.3)
MONOCYTES NFR BLD: 11 % (ref 4–12)
NEUTS SEG # BLD: 4.3 K/UL (ref 1.7–8.2)
NEUTS SEG NFR BLD: 57 % (ref 43–78)
NRBC # BLD: 0 K/UL (ref 0–0.2)
PLATELET # BLD AUTO: 290 K/UL (ref 150–450)
PMV BLD AUTO: 10.3 FL (ref 9.4–12.3)
RBC # BLD AUTO: 4.41 M/UL (ref 4.23–5.6)
WBC # BLD AUTO: 7.6 K/UL (ref 4.3–11.1)

## 2022-10-27 PROCEDURE — 3017F COLORECTAL CA SCREEN DOC REV: CPT | Performed by: FAMILY MEDICINE

## 2022-10-27 PROCEDURE — G8427 DOCREV CUR MEDS BY ELIG CLIN: HCPCS | Performed by: FAMILY MEDICINE

## 2022-10-27 PROCEDURE — 1036F TOBACCO NON-USER: CPT | Performed by: FAMILY MEDICINE

## 2022-10-27 PROCEDURE — 1123F ACP DISCUSS/DSCN MKR DOCD: CPT | Performed by: FAMILY MEDICINE

## 2022-10-27 PROCEDURE — G8484 FLU IMMUNIZE NO ADMIN: HCPCS | Performed by: FAMILY MEDICINE

## 2022-10-27 PROCEDURE — 3074F SYST BP LT 130 MM HG: CPT | Performed by: FAMILY MEDICINE

## 2022-10-27 PROCEDURE — 99214 OFFICE O/P EST MOD 30 MIN: CPT | Performed by: FAMILY MEDICINE

## 2022-10-27 PROCEDURE — G8417 CALC BMI ABV UP PARAM F/U: HCPCS | Performed by: FAMILY MEDICINE

## 2022-10-27 PROCEDURE — 3078F DIAST BP <80 MM HG: CPT | Performed by: FAMILY MEDICINE

## 2022-10-27 PROCEDURE — 3044F HG A1C LEVEL LT 7.0%: CPT | Performed by: FAMILY MEDICINE

## 2022-10-27 PROCEDURE — 2022F DILAT RTA XM EVC RTNOPTHY: CPT | Performed by: FAMILY MEDICINE

## 2022-10-27 RX ORDER — MIRTAZAPINE 15 MG/1
15 TABLET, FILM COATED ORAL NIGHTLY
Qty: 30 TABLET | Refills: 3 | Status: SHIPPED | OUTPATIENT
Start: 2022-10-27

## 2022-10-27 RX ORDER — LISINOPRIL 2.5 MG/1
2.5 TABLET ORAL DAILY
Qty: 90 TABLET | Refills: 1 | Status: SHIPPED | OUTPATIENT
Start: 2022-10-27 | End: 2022-11-30

## 2022-10-27 RX ORDER — ATORVASTATIN CALCIUM 80 MG/1
80 TABLET, FILM COATED ORAL DAILY
Qty: 90 TABLET | Refills: 1 | Status: SHIPPED | OUTPATIENT
Start: 2022-10-27

## 2022-10-27 RX ORDER — CITALOPRAM 20 MG/1
20 TABLET ORAL DAILY
Qty: 90 TABLET | Refills: 1 | Status: SHIPPED | OUTPATIENT
Start: 2022-10-27

## 2022-10-27 ASSESSMENT — ENCOUNTER SYMPTOMS
EYES NEGATIVE: 1
ABDOMINAL PAIN: 0
RESPIRATORY NEGATIVE: 1

## 2022-10-27 ASSESSMENT — PATIENT HEALTH QUESTIONNAIRE - PHQ9
SUM OF ALL RESPONSES TO PHQ QUESTIONS 1-9: 0
SUM OF ALL RESPONSES TO PHQ9 QUESTIONS 1 & 2: 0
1. LITTLE INTEREST OR PLEASURE IN DOING THINGS: 0
SUM OF ALL RESPONSES TO PHQ QUESTIONS 1-9: 0
SUM OF ALL RESPONSES TO PHQ QUESTIONS 1-9: 0
2. FEELING DOWN, DEPRESSED OR HOPELESS: 0
SUM OF ALL RESPONSES TO PHQ QUESTIONS 1-9: 0

## 2022-10-28 ENCOUNTER — PATIENT MESSAGE (OUTPATIENT)
Dept: FAMILY MEDICINE CLINIC | Facility: CLINIC | Age: 72
End: 2022-10-28

## 2022-10-28 LAB
ALBUMIN SERPL-MCNC: 3.8 G/DL (ref 3.2–4.6)
ALBUMIN/GLOB SERPL: 1.3 {RATIO} (ref 0.4–1.6)
ALP SERPL-CCNC: 77 U/L (ref 50–136)
ALT SERPL-CCNC: 37 U/L (ref 12–65)
ANION GAP SERPL CALC-SCNC: 6 MMOL/L (ref 2–11)
AST SERPL-CCNC: 15 U/L (ref 15–37)
BILIRUB SERPL-MCNC: 0.3 MG/DL (ref 0.2–1.1)
BUN SERPL-MCNC: 15 MG/DL (ref 8–23)
CALCIUM SERPL-MCNC: 9.4 MG/DL (ref 8.3–10.4)
CHLORIDE SERPL-SCNC: 106 MMOL/L (ref 101–110)
CHOLEST SERPL-MCNC: 121 MG/DL
CO2 SERPL-SCNC: 29 MMOL/L (ref 21–32)
CREAT SERPL-MCNC: 1.7 MG/DL (ref 0.8–1.5)
EST. AVERAGE GLUCOSE BLD GHB EST-MCNC: 151 MG/DL
GLOBULIN SER CALC-MCNC: 2.9 G/DL (ref 2.8–4.5)
GLUCOSE SERPL-MCNC: 103 MG/DL (ref 65–100)
HBA1C MFR BLD: 6.9 % (ref 4.8–5.6)
HDLC SERPL-MCNC: 35 MG/DL (ref 40–60)
HDLC SERPL: 3.5 {RATIO}
LDLC SERPL CALC-MCNC: 56.2 MG/DL
MAGNESIUM SERPL-MCNC: 2.2 MG/DL (ref 1.8–2.4)
POTASSIUM SERPL-SCNC: 4.5 MMOL/L (ref 3.5–5.1)
PROT SERPL-MCNC: 6.7 G/DL (ref 6.3–8.2)
SODIUM SERPL-SCNC: 141 MMOL/L (ref 133–143)
TRIGL SERPL-MCNC: 149 MG/DL (ref 35–150)
VLDLC SERPL CALC-MCNC: 29.8 MG/DL (ref 6–23)

## 2022-10-28 NOTE — PROGRESS NOTES
[address] [place] [address]  _______________________________________  Ankita Blue MD                 [address] [place] [address] Drive, [address]. MD Natalia                     [place] [address]                                                                                    Phone: [phone]                                                                                    Fax: [phone]    Tomas Route is a 70 y.o. male who is seen for evaluation of   Chief Complaint   Patient presents with    Hypertension     In room with wife    Mental Health Problem    Anxiety    Insomnia       HPI:   Hypertension  This is a chronic problem. The current episode started more than 1 year ago. The problem is unchanged. Condition status: stable on meds, need refills adn labs. Associated symptoms include anxiety. Mental Health Problem    The degree of incapacity that he is experiencing as a consequence of his illness is moderate (depression on meds but still anxiety pretty bad, can't leave home often, need refills celexa and alternative meds as well). Additional symptoms of the illness include insomnia and fatigue. Additional symptoms of the illness do not include appetite change or abdominal pain. Anxiety  Presents for follow-up visit. Symptoms include insomnia. Episode frequency: more severe, see above, can't go to restaurants or other public places. Insomnia  Episode frequency: can't sleep well, no problems breathing noted. Associated symptoms include fatigue. Pertinent negatives include no abdominal pain, anorexia or congestion. Chief Complaint   Patient presents with    Hypertension     In room with wife   [address] Problem    Anxiety    Insomnia         Review of Systems:    Review of Systems   Constitutional:  Positive for fatigue. Negative for activity change and appetite change. HENT:  Negative for congestion, dental problem, drooling, ear discharge and ear pain.     Eyes: Negative. Respiratory: Negative. Gastrointestinal:  Negative for abdominal pain and anorexia. Endocrine: Negative. Negative for cold intolerance, heat intolerance and polyphagia. Genitourinary: Negative. Psychiatric/Behavioral:  The patient has insomnia. History:  Past Medical History:   Diagnosis Date    [facility]     Stroke (cerebrum) ([facility]     September 2017       Past Surgical History:   Procedure Laterality Date    COLONOSCOPY      2011, clear, 10 years       Family History   Problem Relation Age of Onset    Cancer Father     Stroke Mother        Social History     Tobacco Use    Smoking status: Never    Smokeless tobacco: Never   Substance Use Topics    Alcohol use: No       No Known Allergies    Current Outpatient Medications   Medication Sig Dispense Refill    mirtazapine (REMERON) 15 MG tablet Take 1 tablet by mouth nightly 30 tablet 3    lisinopril (PRINIVIL;ZESTRIL) 2.5 MG tablet Take 1 tablet by mouth daily 90 tablet 1    atorvastatin (LIPITOR) 80 MG tablet Take 1 tablet by mouth daily 90 tablet 1    metFORMIN (GLUCOPHAGE) 500 MG tablet Take 1 tablet by mouth 2 times daily (with meals) 180 tablet 1    citalopram (CELEXA) 20 MG tablet Take 1 tablet by mouth daily 90 tablet 1    aspirin 81 MG chewable tablet Take 162 mg by mouth 2 times daily      cyanocobalamin 1000 MCG/ML injection One injection once a week for four weeks, then one injection once a month for one year. No current facility-administered medications for this visit. Vitals:    /70 (Site: Left Upper Arm, Position: Sitting)   Ht 5' 8\" (1.727 m)   Wt 204 lb (92.5 kg)   BMI 31.02 kg/m²     Physical Exam:  Physical Exam  Vitals and nursing note reviewed. Constitutional:       Appearance: Normal appearance. He is not ill-appearing or diaphoretic. HENT:      Head: Normocephalic and atraumatic. Nose: Nose normal.   Eyes:      Pupils: Pupils are equal, round, and reactive to light. Cardiovascular:      Rate and Rhythm: Normal rate and regular rhythm. Pulses: Normal pulses. Heart sounds: Normal heart sounds. Pulmonary:      Effort: Pulmonary effort is normal.      Breath sounds: Normal breath sounds. Musculoskeletal:         General: No swelling or tenderness. Normal range of motion. Cervical back: Normal range of motion and neck supple. Skin:     General: Skin is warm and dry. Findings: No erythema or rash. Neurological:      Mental Status: He is alert and oriented to person, place, and time. Mental status is at baseline. Motor: Weakness present. Coordination: Coordination abnormal.      Gait: Gait abnormal.      Deep Tendon Reflexes: Reflexes abnormal.      Comments: Neuro exam unchanged fro prior stroke   Psychiatric:         Mood and Affect: Mood normal.     Assessment/Plan:     ICD-10-CM    1. Current mild episode of major depressive disorder without prior episode ([facility])  F32.0 mirtazapine (REMERON) 15 MG tablet     citalopram (CELEXA) 20 MG tablet      2. Stage 3a chronic kidney disease ([facility])  N18.31       3. [facility]schem[facility]  I63.9       4. Uncontrolled type 2 diabetes mellitus with hyperosmolarity without coma, with long-term current use of insulin ([facility])  E11.00 metFORMIN (GLUCOPHAGE) 500 MG tablet    Z79.4 Hemoglobin A1C     Hemoglobin A1C      5. Essential hypertension  I10 lisinopril (PRINIVIL;ZESTRIL) 2.5 MG tablet     CBC with Auto Differential     Comprehensive Metabolic Panel     Comprehensive Metabolic Panel     CBC with Auto Differential      6. Mixed hyperlipidemia  E78.2 atorvastatin (LIPITOR) 80 MG tablet     Lipid Panel     Lipid Panel      7. Cerebral infarction due to unspecified occlusion or stenosis of left vertebral artery ([facility])  I63.212       8. Anxiety  F41.9 mirtazapine (REMERON) 15 MG tablet      9. Primary insomnia  F51.01 mirtazapine (REMERON) 15 MG tablet      10.  Hypomagnesemia  E83.42 Magnesium     Magnesium Labs and medicines sent and pending as appropriate  Encourage low salt diet with exercise as tolerated  Encourage weight control efforts to reduce overall health risks in future  Encourage monitor BP at home   Recheck in 6 months or as needed for other problems.        Adair Marcus MD

## 2022-10-31 RX ORDER — LISINOPRIL AND HYDROCHLOROTHIAZIDE 12.5; 1 MG/1; MG/1
1 TABLET ORAL DAILY
Qty: 90 TABLET | Refills: 1 | Status: SHIPPED | OUTPATIENT
Start: 2022-10-31

## 2022-10-31 NOTE — TELEPHONE ENCOUNTER
From: Rich Sharp  To: Dr. Juan Olivas: 10/28/2022 6:41 PM EDT  Subject: Lisinopril    Dad was taking lisinopril hctz 10-12. 5. However the lisinopril we picked up at [facility] is lisinopril 2.5. Was this supposed to be changed? If so, why?     Thank you,  Kari Rios [facility] daughter]  [phone]

## 2022-10-31 NOTE — TELEPHONE ENCOUNTER
Lisino/HCTZ was d/c on 10/27/22 reason says list cleanup. New Rx for Lisino/HCTZ is ready to send if you think it needs to be changed back.

## 2022-11-30 ENCOUNTER — HOSPITAL ENCOUNTER (OUTPATIENT)
Dept: GENERAL RADIOLOGY | Age: 72
Discharge: HOME | End: 2022-12-02
Payer: MEDICARE

## 2022-11-30 ENCOUNTER — OFFICE VISIT (OUTPATIENT)
Dept: FAMILY MEDICINE CLINIC | Facility: CLINIC | Age: 72
End: 2022-11-30
Payer: MEDICARE

## 2022-11-30 VITALS
DIASTOLIC BLOOD PRESSURE: 80 MMHG | WEIGHT: 207 LBS | SYSTOLIC BLOOD PRESSURE: 124 MMHG | HEIGHT: 68 IN | BODY MASS INDEX: 31.37 KG/M2

## 2022-11-30 DIAGNOSIS — M25.562 ACUTE PAIN OF LEFT KNEE: ICD-10-CM

## 2022-11-30 DIAGNOSIS — M25.562 ACUTE PAIN OF LEFT KNEE: Primary | ICD-10-CM

## 2022-11-30 DIAGNOSIS — I10 ESSENTIAL HYPERTENSION: ICD-10-CM

## 2022-11-30 DIAGNOSIS — F32.0 CURRENT MILD EPISODE OF MAJOR DEPRESSIVE DISORDER WITHOUT PRIOR EPISODE (HCC): ICD-10-CM

## 2022-11-30 DIAGNOSIS — N18.31 STAGE 3A CHRONIC KIDNEY DISEASE (HCC): ICD-10-CM

## 2022-11-30 DIAGNOSIS — Z00.00 ROUTINE GENERAL MEDICAL EXAMINATION AT A HEALTH CARE FACILITY: ICD-10-CM

## 2022-11-30 PROCEDURE — 1123F ACP DISCUSS/DSCN MKR DOCD: CPT | Performed by: FAMILY MEDICINE

## 2022-11-30 PROCEDURE — 73562 X-RAY EXAM OF KNEE 3: CPT

## 2022-11-30 PROCEDURE — 3074F SYST BP LT 130 MM HG: CPT | Performed by: FAMILY MEDICINE

## 2022-11-30 PROCEDURE — 3078F DIAST BP <80 MM HG: CPT | Performed by: FAMILY MEDICINE

## 2022-11-30 PROCEDURE — 1036F TOBACCO NON-USER: CPT | Performed by: FAMILY MEDICINE

## 2022-11-30 PROCEDURE — G8417 CALC BMI ABV UP PARAM F/U: HCPCS | Performed by: FAMILY MEDICINE

## 2022-11-30 PROCEDURE — G8484 FLU IMMUNIZE NO ADMIN: HCPCS | Performed by: FAMILY MEDICINE

## 2022-11-30 PROCEDURE — 3017F COLORECTAL CA SCREEN DOC REV: CPT | Performed by: FAMILY MEDICINE

## 2022-11-30 PROCEDURE — 99214 OFFICE O/P EST MOD 30 MIN: CPT | Performed by: FAMILY MEDICINE

## 2022-11-30 PROCEDURE — G0439 PPPS, SUBSEQ VISIT: HCPCS | Performed by: FAMILY MEDICINE

## 2022-11-30 PROCEDURE — G8427 DOCREV CUR MEDS BY ELIG CLIN: HCPCS | Performed by: FAMILY MEDICINE

## 2022-11-30 ASSESSMENT — PATIENT HEALTH QUESTIONNAIRE - PHQ9
SUM OF ALL RESPONSES TO PHQ QUESTIONS 1-9: 0
2. FEELING DOWN, DEPRESSED OR HOPELESS: 0
SUM OF ALL RESPONSES TO PHQ QUESTIONS 1-9: 0
1. LITTLE INTEREST OR PLEASURE IN DOING THINGS: 0
SUM OF ALL RESPONSES TO PHQ9 QUESTIONS 1 & 2: 0
SUM OF ALL RESPONSES TO PHQ QUESTIONS 1-9: 0
SUM OF ALL RESPONSES TO PHQ QUESTIONS 1-9: 0

## 2022-11-30 ASSESSMENT — LIFESTYLE VARIABLES
HOW OFTEN DO YOU HAVE A DRINK CONTAINING ALCOHOL: NEVER
HOW MANY STANDARD DRINKS CONTAINING ALCOHOL DO YOU HAVE ON A TYPICAL DAY: PATIENT DOES NOT DRINK

## 2022-11-30 ASSESSMENT — ENCOUNTER SYMPTOMS
BLURRED VISION: 0
EYES NEGATIVE: 1
ORTHOPNEA: 0
RESPIRATORY NEGATIVE: 1

## 2022-11-30 NOTE — PROGRESS NOTES
Medicare Annual Wellness Visit    Jena Venegas is here for Medicare AWV, Knee Pain (Left knee pain and swelling ), Hypertension, Diabetes, and Cholesterol Problem    Assessment & Plan   Acute pain of left knee  -     XR KNEE LEFT (3 VIEWS); Future  Current mild episode of major depressive disorder without prior episode (HCC)  Stage 3a chronic kidney disease ([facility]  Essential hypertension  Routine general medical examination at a health care facility    Recommendations for Preventive Services Due: see orders and patient instructions/AVS.  Recommended screening schedule for the next 5-10 years is provided to the patient in written form: see Patient Instructions/AVS.     Return for Medicare Annual Wellness Visit in 1 year. Subjective   The following acute and/or chronic problems were also addressed today:  Left knee pain with hyperextension problems at tmes, no recent fall noted, xray ordered and consider PT eval and treat. Patient's complete Health Risk Assessment and screening values have been reviewed and are found in Flowsheets. The following problems were reviewed today and where indicated follow up appointments were made and/or referrals ordered.     Positive Risk Factor Screenings with Interventions:    Fall Risk:  Do you feel unsteady or are you worried about falling? : no  2 or more falls in past year?: (!) yes (X2)  Fall with injury in past year?: no   Fall Risk Interventions:    Home safety tips provided            General Health and ACP:  General  In general, how would you say your health is?: Good  In the past 7 days, have you experienced any of the following: New or Increased Pain, New or Increased Fatigue, Loneliness, Social Isolation, Stress or Anger?: No  Do you get the social and emotional support that you need?: Yes  Do you have a Living Will?: (!) No    Advance Directives       Power of  Living Will ACP-Advance Directive ACP-Power of     Not on File Not on File Not on File Not on File          General Health Risk Interventions:  Poor self-assessment of health status: patient advised to follow-up in this office for further evaluation and treatment of HTN within 4 month(s)    Health Habits/Nutrition:  Physical Activity: Insufficiently Active    Days of Exercise per Week: 1 day    Minutes of Exercise per Session: 10 min     Have you lost any weight without trying in the past 3 months?: No  Body mass index: (!) 31.47  Have you seen the dentist within the past year?: (!) No  Health Habits/Nutrition Interventions:  Inadequate physical activity:  patient agrees to increase physical activity as follows: increase walking as tolerated      ADLs:  In the past 7 days, did you need help from others to perform any of the following everyday activities: Eating, dressing, grooming, bathing, toileting, or walking/balance?: No  In the past 7 days, did you need help from others to take care of any of the following: Laundry, housekeeping, banking/finances, shopping, telephone use, food preparation, transportation, or taking medications?: (!) Yes  Select all that apply: [facility], Transportation  ADL Interventions:  N/a          Objective   Vitals:    11/30/22 1423   BP: 124/80   Site: Left Upper Arm   Position: Sitting   Weight: 207 lb (93.9 kg)   Height: 5' 8\" (1.727 m)      Body mass index is 31.47 kg/m². General Appearance: alert and oriented to person, place and time, well-developed and well-nourished, in no acute distress  Pulmonary/Chest: clear to auscultation bilaterally- no wheezes, rales or rhonchi, normal air movement, no respiratory distress  Cardiovascular: normal rate, normal S1 and S2, no gallops, intact distal pulses, and no carotid bruits       No Known Allergies  Prior to Visit Medications    Medication Sig Taking?  Authorizing Provider   lisinopril-hydroCHLOROthiazide (PRINZIDE;ZESTORETIC) 10-12.5 MG per tablet Take 1 tablet by mouth daily Yes Joon Evans MD mirtazapine (REMERON) 15 MG tablet Take 1 tablet by mouth nightly Yes Omaira Almonte MD   atorvastatin (LIPITOR) 80 MG tablet Take 1 tablet by mouth daily Yes Omaira Almonte MD   metFORMIN (GLUCOPHAGE) 500 MG tablet Take 1 tablet by mouth 2 times daily (with meals) Yes Omaira Almonte MD   citalopram (CELEXA) 20 MG tablet Take 1 tablet by mouth daily Yes Omaira Almonte MD   aspirin 81 MG chewable tablet Take 162 mg by mouth 2 times daily Yes Ar [employer] Reconciliation   cyanocobalamin 1000 MCG/ML injection One injection once a week for four weeks, then one injection once a month for one year.  Yes Ar Automatic Reconciliation   lisinopril (PRINIVIL;ZESTRIL) 2.5 MG tablet Take 1 tablet by mouth daily  Patient not taking: Reported on 11/30/2022  Omaira Almonte MD       [facility] (Including outside providers/suppliers regularly involved in providing care):   Patient Care Team:  Omaira Almonte MD as PCP - Gisselle Camargo MD as PCP - [facility] Empaneled Provider  Eric Manzo PA-C as Physician Assistant     Reviewed and updated this visit:  Tobacco  Allergies  Meds  Problems  Med Hx  Surg Hx  Soc Hx  Fam Hx

## 2022-11-30 NOTE — PROGRESS NOTES
[address] [place] [address]  _______________________________________  Torri Mcdowell MD                 [address] [place] [address],  [address]. [place], [address] [place], [place] [address] martinez                                                                                     Phone: [phone]                                                                                    Fax: [phone]    Tamia Salguero is a 70 y.o. male who is seen for evaluation of   Chief Complaint   Patient presents with    Medicare AWV    Knee Pain     Left knee pain and swelling     Hypertension    Diabetes    Cholesterol Problem       HPI:   Knee Pain   Incident location: last 2 weeks pain and pressure on left knee medially, sore to touch and worse with weight bearing. no fall remembered. Hypertension  This is a chronic problem. Progression since onset: stable on meds, no side effect noted, no breakthrough loss of control. Pertinent negatives include no blurred vision, chest pain, malaise/fatigue, neck pain, orthopnea or palpitations. Agents associated with hypertension include estrogens. Diabetes  He presents for his follow-up diabetic visit. He has type 2 diabetes mellitus. Onset time: states sugar doing better at home, trying to exercise when he can. Pertinent negatives for diabetes include no blurred vision, no chest pain and no fatigue. Chief Complaint   Patient presents with    Medicare AWV    Knee Pain     Left knee pain and swelling     Hypertension    Diabetes    Cholesterol Problem         Review of Systems:    Review of Systems   Constitutional:  Negative for activity change, appetite change, chills, fatigue and malaise/fatigue. HENT: Negative. Eyes: Negative. Negative for blurred vision. Respiratory: Negative. Cardiovascular:  Negative for chest pain, palpitations and orthopnea. Endocrine: Negative. Negative for cold intolerance and heat intolerance. Genitourinary: Negative. Musculoskeletal:  Negative for neck pain. History:  Past Medical History:   Diagnosis Date    Diabetes ([address]     Stroke (cerebrum) ([facility]     September 2017       Past Surgical History:   Procedure Laterality Date    COLONOSCOPY      2011, clear, 10 years       Family History   Problem Relation Age of Onset    Cancer Father     Stroke Mother        Social History     Tobacco Use    Smoking status: Never    Smokeless tobacco: Never   Substance Use Topics    Alcohol use: No       No Known Allergies    Current Outpatient Medications   Medication Sig Dispense Refill    lisinopril-hydroCHLOROthiazide (PRINZIDE;ZESTORETIC) 10-12.5 MG per tablet Take 1 tablet by mouth daily 90 tablet 1    mirtazapine (REMERON) 15 MG tablet Take 1 tablet by mouth nightly 30 tablet 3    atorvastatin (LIPITOR) 80 MG tablet Take 1 tablet by mouth daily 90 tablet 1    metFORMIN (GLUCOPHAGE) 500 MG tablet Take 1 tablet by mouth 2 times daily (with meals) 180 tablet 1    citalopram (CELEXA) 20 MG tablet Take 1 tablet by mouth daily 90 tablet 1    aspirin 81 MG chewable tablet Take 162 mg by mouth 2 times daily      cyanocobalamin 1000 MCG/ML injection One injection once a week for four weeks, then one injection once a month for one year.  lisinopril (PRINIVIL;ZESTRIL) 2.5 MG tablet Take 1 tablet by mouth daily (Patient not taking: Reported on 11/30/2022) 90 tablet 1     No current facility-administered medications for this visit. Vitals:    /80 (Site: Left Upper Arm, Position: Sitting)   Ht 5' 8\" (1.727 m)   Wt 207 lb (93.9 kg)   BMI 31.47 kg/m²     Physical Exam:  Physical Exam  Vitals and nursing note reviewed. Constitutional:       Appearance: Normal appearance. He is not ill-appearing or diaphoretic. HENT:      Head: Normocephalic and atraumatic. Nose: Nose normal.   Eyes:      Pupils: Pupils are equal, round, and reactive to light.    Cardiovascular:      Rate and Rhythm: Normal rate and regular rhythm. Pulses: Normal pulses. Heart sounds: Normal heart sounds. Pulmonary:      Effort: Pulmonary effort is normal.      Breath sounds: Normal breath sounds. Musculoskeletal:         General: Tenderness present. No swelling. Normal range of motion. Cervical back: Normal range of motion and neck supple. Comments: Pain and crepitus noted left knee, mild effusion noted,    Skin:     General: Skin is warm and dry. Findings: No erythema or rash. Neurological:      General: No focal deficit present. Mental Status: He is alert and oriented to person, place, and time. Mental status is at baseline. Psychiatric:         Mood and Affect: Mood normal.     Assessment/Plan:     ICD-10-CM    1. Acute pain of left knee  M25.562 XR KNEE LEFT (3 VIEWS)      2. Current mild episode of major depressive disorder without prior episode ([facility]  F32.0       3. Stage 3a chronic kidney disease (HCC)  N18.31       4. Essential hypertension  I10       5. Routine general medical examination at a health care facility  Z00.00         NEW ISSUE: left knee pain, no recent fall noted, feels like popping at times and also once felt like would hyperextend. Pain medially, sore to touch and with weight bearing. Xray ordered and consider PT or Ortho refer pending results    Labs from last time reviewed   Encourage low salt diet with exercise as tolerated  Encourage weight control efforts to reduce overall health risks in future  Encourage monitor BP at home   Recheck in 6 months or as needed for other problems.        Umang Sheehan MD

## 2023-03-01 ENCOUNTER — OFFICE VISIT (OUTPATIENT)
Dept: FAMILY MEDICINE CLINIC | Facility: CLINIC | Age: 73
End: 2023-03-01
Payer: MEDICARE

## 2023-03-01 DIAGNOSIS — E78.2 MIXED HYPERLIPIDEMIA: ICD-10-CM

## 2023-03-01 DIAGNOSIS — E11.65 TYPE 2 DIABETES MELLITUS WITH HYPERGLYCEMIA, WITHOUT LONG-TERM CURRENT USE OF INSULIN (HCC): ICD-10-CM

## 2023-03-01 DIAGNOSIS — F32.0 CURRENT MILD EPISODE OF MAJOR DEPRESSIVE DISORDER WITHOUT PRIOR EPISODE (HCC): ICD-10-CM

## 2023-03-01 DIAGNOSIS — N18.31 STAGE 3A CHRONIC KIDNEY DISEASE (HCC): ICD-10-CM

## 2023-03-01 DIAGNOSIS — I10 ESSENTIAL HYPERTENSION: Primary | ICD-10-CM

## 2023-03-01 DIAGNOSIS — I63.9 ISCHEMIC STROKE (HCC): ICD-10-CM

## 2023-03-01 LAB
BASOPHILS # BLD: 0.1 K/UL (ref 0–0.2)
BASOPHILS NFR BLD: 2 % (ref 0–2)
DIFFERENTIAL METHOD BLD: ABNORMAL
EOSINOPHIL # BLD: 0.6 K/UL (ref 0–0.8)
EOSINOPHIL NFR BLD: 9 % (ref 0.5–7.8)
ERYTHROCYTE [DISTWIDTH] IN BLOOD BY AUTOMATED COUNT: 12.9 % (ref 11.9–14.6)
HCT VFR BLD AUTO: 42.1 % (ref 41.1–50.3)
HGB BLD-MCNC: 14.3 G/DL (ref 13.6–17.2)
IMM GRANULOCYTES # BLD AUTO: 0 K/UL (ref 0–0.5)
IMM GRANULOCYTES NFR BLD AUTO: 0 % (ref 0–5)
LYMPHOCYTES # BLD: 1.8 K/UL (ref 0.5–4.6)
LYMPHOCYTES NFR BLD: 27 % (ref 13–44)
MCH RBC QN AUTO: 31.2 PG (ref 26.1–32.9)
MCHC RBC AUTO-ENTMCNC: 34 G/DL (ref 31.4–35)
MCV RBC AUTO: 91.9 FL (ref 82–102)
MONOCYTES # BLD: 0.8 K/UL (ref 0.1–1.3)
MONOCYTES NFR BLD: 12 % (ref 4–12)
NEUTS SEG # BLD: 3.4 K/UL (ref 1.7–8.2)
NEUTS SEG NFR BLD: 50 % (ref 43–78)
NRBC # BLD: 0 K/UL (ref 0–0.2)
PLATELET # BLD AUTO: 277 K/UL (ref 150–450)
PMV BLD AUTO: 9.9 FL (ref 9.4–12.3)
RBC # BLD AUTO: 4.58 M/UL (ref 4.23–5.6)
WBC # BLD AUTO: 6.7 K/UL (ref 4.3–11.1)

## 2023-03-01 PROCEDURE — 1123F ACP DISCUSS/DSCN MKR DOCD: CPT | Performed by: FAMILY MEDICINE

## 2023-03-01 PROCEDURE — 2022F DILAT RTA XM EVC RTNOPTHY: CPT | Performed by: FAMILY MEDICINE

## 2023-03-01 PROCEDURE — 3046F HEMOGLOBIN A1C LEVEL >9.0%: CPT | Performed by: FAMILY MEDICINE

## 2023-03-01 PROCEDURE — G8417 CALC BMI ABV UP PARAM F/U: HCPCS | Performed by: FAMILY MEDICINE

## 2023-03-01 PROCEDURE — 3017F COLORECTAL CA SCREEN DOC REV: CPT | Performed by: FAMILY MEDICINE

## 2023-03-01 PROCEDURE — G8484 FLU IMMUNIZE NO ADMIN: HCPCS | Performed by: FAMILY MEDICINE

## 2023-03-01 PROCEDURE — 99214 OFFICE O/P EST MOD 30 MIN: CPT | Performed by: FAMILY MEDICINE

## 2023-03-01 PROCEDURE — 1036F TOBACCO NON-USER: CPT | Performed by: FAMILY MEDICINE

## 2023-03-01 PROCEDURE — G8427 DOCREV CUR MEDS BY ELIG CLIN: HCPCS | Performed by: FAMILY MEDICINE

## 2023-03-01 RX ORDER — HYDROCODONE BITARTRATE AND ACETAMINOPHEN 7.5; 325 MG/1; MG/1
TABLET ORAL
COMMUNITY
Start: 2023-02-01 | End: 2023-03-01

## 2023-03-01 RX ORDER — LISINOPRIL 2.5 MG/1
TABLET ORAL
COMMUNITY
Start: 2023-01-24 | End: 2023-03-01

## 2023-03-01 SDOH — ECONOMIC STABILITY: HOUSING INSECURITY
IN THE LAST 12 MONTHS, WAS THERE A TIME WHEN YOU DID NOT HAVE A STEADY PLACE TO SLEEP OR SLEPT IN A SHELTER (INCLUDING NOW)?: PATIENT REFUSED

## 2023-03-01 SDOH — ECONOMIC STABILITY: FOOD INSECURITY: WITHIN THE PAST 12 MONTHS, YOU WORRIED THAT YOUR FOOD WOULD RUN OUT BEFORE YOU GOT MONEY TO BUY MORE.: PATIENT DECLINED

## 2023-03-01 SDOH — ECONOMIC STABILITY: INCOME INSECURITY: HOW HARD IS IT FOR YOU TO PAY FOR THE VERY BASICS LIKE FOOD, HOUSING, MEDICAL CARE, AND HEATING?: PATIENT DECLINED

## 2023-03-01 SDOH — ECONOMIC STABILITY: FOOD INSECURITY: WITHIN THE PAST 12 MONTHS, THE FOOD YOU BOUGHT JUST DIDN'T LAST AND YOU DIDN'T HAVE MONEY TO GET MORE.: PATIENT DECLINED

## 2023-03-01 ASSESSMENT — ENCOUNTER SYMPTOMS
ANAL BLEEDING: 0
EYE DISCHARGE: 0
BACK PAIN: 0
EYE REDNESS: 0
RHINORRHEA: 0
EYE PAIN: 0
ORTHOPNEA: 0
ABDOMINAL DISTENTION: 0
APNEA: 0
CHEST TIGHTNESS: 0
COUGH: 0
EYE ITCHING: 0
SHORTNESS OF BREATH: 0
FACIAL SWELLING: 0
SINUS PRESSURE: 0
ABDOMINAL PAIN: 0
BLOOD IN STOOL: 0
SINUS PAIN: 0

## 2023-03-01 ASSESSMENT — PATIENT HEALTH QUESTIONNAIRE - PHQ9
SUM OF ALL RESPONSES TO PHQ9 QUESTIONS 1 & 2: 0
SUM OF ALL RESPONSES TO PHQ QUESTIONS 1-9: 0
SUM OF ALL RESPONSES TO PHQ QUESTIONS 1-9: 0
2. FEELING DOWN, DEPRESSED OR HOPELESS: 0
1. LITTLE INTEREST OR PLEASURE IN DOING THINGS: 0
SUM OF ALL RESPONSES TO PHQ QUESTIONS 1-9: 0
SUM OF ALL RESPONSES TO PHQ QUESTIONS 1-9: 0

## 2023-03-01 NOTE — PROGRESS NOTES
[address] [place] [address]  _______________________________________  Franko Shrestha MD                 [address] [place] [address] Drive, [address]. MD Natalia                     [facility]                                                                                    Phone: [phone]                                                                                    Fax: [phone]    Nathaniel Reynoso is a 67 y.o. male who is seen for evaluation of   Chief Complaint   Patient presents with    Hypertension    Cholesterol Problem    Anxiety    Depression    Diabetes       HPI:   Hypertension  This is a chronic problem. Progression since onset: stable on meds, need efills and labs. Associated symptoms include anxiety. Pertinent negatives include no headaches, malaise/fatigue, neck pain, orthopnea, peripheral edema, PND or shortness of breath. Anxiety  Presents for follow-up visit. Patient reports no shortness of breath. Primary symptoms comment: reports stable o nmeds, need reiflls. Depression  Visit Type: follow-up  Patient is not experiencing: shortness of breath. Episode frequency: on meds, needs refills, no side effect noted. Diabetes  He presents for his follow-up diabetic visit. He has type 2 diabetes mellitus. Onset time: stable on meds, had waened off the metformin for a while but sugar went to 300 , started back and feeling better. Pertinent negatives for hypoglycemia include no headaches or pallor. Pertinent negatives for diabetes include no fatigue. Chief Complaint   Patient presents with    Hypertension    Cholesterol Problem    Anxiety    Depression    Diabetes         Review of Systems:    Review of Systems   Constitutional:  Negative for activity change, appetite change, chills, diaphoresis, fatigue, fever and malaise/fatigue.    HENT:  Negative for congestion, ear discharge, ear pain, facial swelling, hearing loss, mouth sores, rhinorrhea, sinus pressure and sinus pain. Eyes:  Negative for pain, discharge, redness and itching. Respiratory:  Negative for apnea, cough, chest tightness and shortness of breath. Cardiovascular:  Negative for orthopnea, leg swelling and PND. Gastrointestinal:  Negative for abdominal distention, abdominal pain, anal bleeding and blood in stool. Endocrine: Negative for cold intolerance and heat intolerance. Genitourinary:  Negative for difficulty urinating, dysuria, enuresis, flank pain, frequency and hematuria. Musculoskeletal:  Negative for arthralgias, back pain, gait problem, joint swelling, myalgias and neck pain. Skin:  Negative for pallor and rash. Neurological:  Negative for facial asymmetry, light-headedness and headaches. Psychiatric/Behavioral:  Positive for depression. Negative for agitation, behavioral problems and sleep disturbance.       History:  Past Medical History:   Diagnosis Date    [facility]     [facility]     September 2017       Past Surgical History:   Procedure Laterality Date    COLONOSCOPY      2011, clear, 10 years       Family History   Problem Relation Age of Onset    Cancer Father     Stroke Mother        Social History     Tobacco Use    Smoking status: Never    Smokeless tobacco: Never   Substance Use Topics    Alcohol use: No       No Known Allergies    Current Outpatient Medications   Medication Sig Dispense Refill    lisinopril-hydroCHLOROthiazide (PRINZIDE;ZESTORETIC) 10-12.5 MG per tablet Take 1 tablet by mouth daily 90 tablet 1    mirtazapine (REMERON) 15 MG tablet Take 1 tablet by mouth nightly 30 tablet 3    atorvastatin (LIPITOR) 80 MG tablet Take 1 tablet by mouth daily 90 tablet 1    metFORMIN (GLUCOPHAGE) 500 MG tablet Take 1 tablet by mouth 2 times daily (with meals) 180 tablet 1    citalopram (CELEXA) 20 MG tablet Take 1 tablet by mouth daily 90 tablet 1    aspirin 81 MG chewable tablet Take 162 mg by mouth 2 times daily      cyanocobalamin 1000 MCG/ML injection One injection once a week for four weeks, then one injection once a month for one year. No current facility-administered medications for this visit. Vitals: There were no vitals taken for this visit. Physical Exam:  Physical Exam    Assessment/Plan:     ICD-10-CM    1. Essential hypertension  I10 CBC with Auto Differential     Comprehensive Metabolic Panel     Comprehensive Metabolic Panel     CBC with Auto Differential      2. Current mild episode of major depressive disorder without prior episode [facility]  F32.0       3. Type 2 diabetes mellitus with hyperglycemia, without long-term current use of insulin (HCC)  E11.65 Hemoglobin A1C     Hemoglobin A1C      4. Stage 3a chronic kidney disease (HCC)  N18.31 Comprehensive Metabolic Panel     Comprehensive Metabolic Panel      5. Ischemic stroke ([facility]  I63.9 CBC with Auto Differential     CBC with Auto Differential      6.  Mixed hyperlipidemia  E78.2 Lipid Panel     Lipid Panel      Prior stroke, no new changes noted, has phantom pain in right arm at times, comes and goes  Meds sent  Labs sent  Encourage diet and exercise   Encourage weight loss,  Encourage home sugar monitoring  Call if problems  Recheck 3 months     Christi Nichole MD

## 2023-03-02 LAB
ALBUMIN SERPL-MCNC: 3.7 G/DL (ref 3.2–4.6)
ALBUMIN/GLOB SERPL: 1.1 (ref 0.4–1.6)
ALP SERPL-CCNC: 93 U/L (ref 50–136)
ALT SERPL-CCNC: 44 U/L (ref 12–65)
ANION GAP SERPL CALC-SCNC: 4 MMOL/L (ref 2–11)
AST SERPL-CCNC: 23 U/L (ref 15–37)
BILIRUB SERPL-MCNC: 0.6 MG/DL (ref 0.2–1.1)
BUN SERPL-MCNC: 18 MG/DL (ref 8–23)
CALCIUM SERPL-MCNC: 9.2 MG/DL (ref 8.3–10.4)
CHLORIDE SERPL-SCNC: 107 MMOL/L (ref 101–110)
CHOLEST SERPL-MCNC: 117 MG/DL
CO2 SERPL-SCNC: 27 MMOL/L (ref 21–32)
CREAT SERPL-MCNC: 1.6 MG/DL (ref 0.8–1.5)
EST. AVERAGE GLUCOSE BLD GHB EST-MCNC: 192 MG/DL
GLOBULIN SER CALC-MCNC: 3.3 G/DL (ref 2.8–4.5)
GLUCOSE SERPL-MCNC: 119 MG/DL (ref 65–100)
HBA1C MFR BLD: 8.3 % (ref 4.8–5.6)
HDLC SERPL-MCNC: 33 MG/DL (ref 40–60)
HDLC SERPL: 3.5
LDLC SERPL CALC-MCNC: 55.2 MG/DL
POTASSIUM SERPL-SCNC: 4.1 MMOL/L (ref 3.5–5.1)
PROT SERPL-MCNC: 7 G/DL (ref 6.3–8.2)
SODIUM SERPL-SCNC: 138 MMOL/L (ref 133–143)
TRIGL SERPL-MCNC: 144 MG/DL (ref 35–150)
VLDLC SERPL CALC-MCNC: 28.8 MG/DL (ref 6–23)

## 2023-03-13 DIAGNOSIS — F51.01 PRIMARY INSOMNIA: ICD-10-CM

## 2023-03-13 DIAGNOSIS — F32.0 CURRENT MILD EPISODE OF MAJOR DEPRESSIVE DISORDER WITHOUT PRIOR EPISODE (HCC): ICD-10-CM

## 2023-03-13 DIAGNOSIS — F41.9 ANXIETY: ICD-10-CM

## 2023-03-13 RX ORDER — MIRTAZAPINE 15 MG/1
15 TABLET, FILM COATED ORAL NIGHTLY
Qty: 30 TABLET | Refills: 3 | Status: SHIPPED | OUTPATIENT
Start: 2023-03-13

## 2023-07-11 ENCOUNTER — OFFICE VISIT (OUTPATIENT)
Dept: FAMILY MEDICINE CLINIC | Facility: CLINIC | Age: 73
End: 2023-07-11
Payer: MEDICARE

## 2023-07-11 VITALS
WEIGHT: 205 LBS | DIASTOLIC BLOOD PRESSURE: 78 MMHG | HEIGHT: 68 IN | SYSTOLIC BLOOD PRESSURE: 138 MMHG | BODY MASS INDEX: 31.07 KG/M2

## 2023-07-11 DIAGNOSIS — F41.9 ANXIETY: ICD-10-CM

## 2023-07-11 DIAGNOSIS — E78.2 MIXED HYPERLIPIDEMIA: ICD-10-CM

## 2023-07-11 DIAGNOSIS — F32.0 CURRENT MILD EPISODE OF MAJOR DEPRESSIVE DISORDER WITHOUT PRIOR EPISODE (HCC): ICD-10-CM

## 2023-07-11 DIAGNOSIS — Z79.4 UNCONTROLLED TYPE 2 DIABETES MELLITUS WITH HYPEROSMOLARITY WITHOUT COMA, WITH LONG-TERM CURRENT USE OF INSULIN (HCC): ICD-10-CM

## 2023-07-11 DIAGNOSIS — F51.01 PRIMARY INSOMNIA: ICD-10-CM

## 2023-07-11 DIAGNOSIS — I63.9 ISCHEMIC STROKE (HCC): ICD-10-CM

## 2023-07-11 DIAGNOSIS — I10 ESSENTIAL HYPERTENSION: Primary | ICD-10-CM

## 2023-07-11 DIAGNOSIS — R53.83 FATIGUE, UNSPECIFIED TYPE: ICD-10-CM

## 2023-07-11 DIAGNOSIS — E11.00 UNCONTROLLED TYPE 2 DIABETES MELLITUS WITH HYPEROSMOLARITY WITHOUT COMA, WITH LONG-TERM CURRENT USE OF INSULIN (HCC): ICD-10-CM

## 2023-07-11 DIAGNOSIS — N18.31 STAGE 3A CHRONIC KIDNEY DISEASE (HCC): ICD-10-CM

## 2023-07-11 LAB
BASOPHILS # BLD: 0.1 K/UL (ref 0–0.2)
BASOPHILS NFR BLD: 1 % (ref 0–2)
DIFFERENTIAL METHOD BLD: NORMAL
EOSINOPHIL # BLD: 0.5 K/UL (ref 0–0.8)
EOSINOPHIL NFR BLD: 7 % (ref 0.5–7.8)
ERYTHROCYTE [DISTWIDTH] IN BLOOD BY AUTOMATED COUNT: 13.3 % (ref 11.9–14.6)
HCT VFR BLD AUTO: 42.2 % (ref 41.1–50.3)
HGB BLD-MCNC: 14.2 G/DL (ref 13.6–17.2)
IMM GRANULOCYTES # BLD AUTO: 0 K/UL (ref 0–0.5)
IMM GRANULOCYTES NFR BLD AUTO: 0 % (ref 0–5)
LYMPHOCYTES # BLD: 1.9 K/UL (ref 0.5–4.6)
LYMPHOCYTES NFR BLD: 31 % (ref 13–44)
MCH RBC QN AUTO: 30.7 PG (ref 26.1–32.9)
MCHC RBC AUTO-ENTMCNC: 33.6 G/DL (ref 31.4–35)
MCV RBC AUTO: 91.1 FL (ref 82–102)
MONOCYTES # BLD: 0.7 K/UL (ref 0.1–1.3)
MONOCYTES NFR BLD: 11 % (ref 4–12)
NEUTS SEG # BLD: 3.1 K/UL (ref 1.7–8.2)
NEUTS SEG NFR BLD: 50 % (ref 43–78)
NRBC # BLD: 0 K/UL (ref 0–0.2)
PLATELET # BLD AUTO: 281 K/UL (ref 150–450)
PMV BLD AUTO: 10 FL (ref 9.4–12.3)
RBC # BLD AUTO: 4.63 M/UL (ref 4.23–5.6)
WBC # BLD AUTO: 6.3 K/UL (ref 4.3–11.1)

## 2023-07-11 PROCEDURE — 1123F ACP DISCUSS/DSCN MKR DOCD: CPT | Performed by: FAMILY MEDICINE

## 2023-07-11 PROCEDURE — 1036F TOBACCO NON-USER: CPT | Performed by: FAMILY MEDICINE

## 2023-07-11 PROCEDURE — 3075F SYST BP GE 130 - 139MM HG: CPT | Performed by: FAMILY MEDICINE

## 2023-07-11 PROCEDURE — 3078F DIAST BP <80 MM HG: CPT | Performed by: FAMILY MEDICINE

## 2023-07-11 PROCEDURE — 3017F COLORECTAL CA SCREEN DOC REV: CPT | Performed by: FAMILY MEDICINE

## 2023-07-11 PROCEDURE — 3052F HG A1C>EQUAL 8.0%<EQUAL 9.0%: CPT | Performed by: FAMILY MEDICINE

## 2023-07-11 PROCEDURE — G8427 DOCREV CUR MEDS BY ELIG CLIN: HCPCS | Performed by: FAMILY MEDICINE

## 2023-07-11 PROCEDURE — 2022F DILAT RTA XM EVC RTNOPTHY: CPT | Performed by: FAMILY MEDICINE

## 2023-07-11 PROCEDURE — 99214 OFFICE O/P EST MOD 30 MIN: CPT | Performed by: FAMILY MEDICINE

## 2023-07-11 PROCEDURE — G8417 CALC BMI ABV UP PARAM F/U: HCPCS | Performed by: FAMILY MEDICINE

## 2023-07-11 RX ORDER — MIRTAZAPINE 15 MG/1
15 TABLET, FILM COATED ORAL NIGHTLY
Qty: 30 TABLET | Refills: 3 | Status: SHIPPED | OUTPATIENT
Start: 2023-07-11

## 2023-07-11 RX ORDER — LISINOPRIL AND HYDROCHLOROTHIAZIDE 12.5; 1 MG/1; MG/1
1 TABLET ORAL DAILY
Qty: 90 TABLET | Refills: 1 | Status: SHIPPED | OUTPATIENT
Start: 2023-07-11

## 2023-07-11 RX ORDER — ATORVASTATIN CALCIUM 80 MG/1
80 TABLET, FILM COATED ORAL DAILY
Qty: 90 TABLET | Refills: 1 | Status: SHIPPED | OUTPATIENT
Start: 2023-07-11

## 2023-07-11 RX ORDER — CITALOPRAM 20 MG/1
20 TABLET ORAL DAILY
Qty: 90 TABLET | Refills: 1 | Status: SHIPPED | OUTPATIENT
Start: 2023-07-11

## 2023-07-11 ASSESSMENT — PATIENT HEALTH QUESTIONNAIRE - PHQ9
SUM OF ALL RESPONSES TO PHQ QUESTIONS 1-9: 0
2. FEELING DOWN, DEPRESSED OR HOPELESS: 0
SUM OF ALL RESPONSES TO PHQ9 QUESTIONS 1 & 2: 0
SUM OF ALL RESPONSES TO PHQ QUESTIONS 1-9: 0
1. LITTLE INTEREST OR PLEASURE IN DOING THINGS: 0
SUM OF ALL RESPONSES TO PHQ QUESTIONS 1-9: 0
SUM OF ALL RESPONSES TO PHQ QUESTIONS 1-9: 0

## 2023-07-11 ASSESSMENT — ENCOUNTER SYMPTOMS
CHEST TIGHTNESS: 0
EYE REDNESS: 0
BACK PAIN: 0
ORTHOPNEA: 0
EYE ITCHING: 0
SINUS PAIN: 0
BLURRED VISION: 0
ABDOMINAL PAIN: 0
RHINORRHEA: 0
ABDOMINAL DISTENTION: 0
ANAL BLEEDING: 0
BLOOD IN STOOL: 0
EYE PAIN: 0
SINUS PRESSURE: 0
COUGH: 0
EYE DISCHARGE: 0
FACIAL SWELLING: 0
APNEA: 0

## 2023-07-12 LAB
ALBUMIN SERPL-MCNC: 3.6 G/DL (ref 3.2–4.6)
ALBUMIN/GLOB SERPL: 1 (ref 0.4–1.6)
ALP SERPL-CCNC: 86 U/L (ref 50–136)
ALT SERPL-CCNC: 40 U/L (ref 12–65)
ANION GAP SERPL CALC-SCNC: 5 MMOL/L (ref 2–11)
AST SERPL-CCNC: 19 U/L (ref 15–37)
BILIRUB SERPL-MCNC: 0.3 MG/DL (ref 0.2–1.1)
BUN SERPL-MCNC: 14 MG/DL (ref 8–23)
CALCIUM SERPL-MCNC: 9.6 MG/DL (ref 8.3–10.4)
CHLORIDE SERPL-SCNC: 105 MMOL/L (ref 101–110)
CHOLEST SERPL-MCNC: 228 MG/DL
CO2 SERPL-SCNC: 27 MMOL/L (ref 21–32)
CREAT SERPL-MCNC: 1.7 MG/DL (ref 0.8–1.5)
EST. AVERAGE GLUCOSE BLD GHB EST-MCNC: 151 MG/DL
GLOBULIN SER CALC-MCNC: 3.6 G/DL (ref 2.8–4.5)
GLUCOSE SERPL-MCNC: 106 MG/DL (ref 65–100)
HBA1C MFR BLD: 6.9 % (ref 4.8–5.6)
HDLC SERPL-MCNC: 34 MG/DL (ref 40–60)
HDLC SERPL: 6.7
LDLC SERPL CALC-MCNC: 122.8 MG/DL
POTASSIUM SERPL-SCNC: 4.3 MMOL/L (ref 3.5–5.1)
PROT SERPL-MCNC: 7.2 G/DL (ref 6.3–8.2)
SODIUM SERPL-SCNC: 137 MMOL/L (ref 133–143)
TRIGL SERPL-MCNC: 356 MG/DL (ref 35–150)
TSH, 3RD GENERATION: 2.79 UIU/ML (ref 0.36–3.74)
VLDLC SERPL CALC-MCNC: 71.2 MG/DL (ref 6–23)

## 2023-11-13 ENCOUNTER — OFFICE VISIT (OUTPATIENT)
Dept: FAMILY MEDICINE CLINIC | Facility: CLINIC | Age: 73
End: 2023-11-13
Payer: MEDICARE

## 2023-11-13 VITALS
BODY MASS INDEX: 30.62 KG/M2 | SYSTOLIC BLOOD PRESSURE: 130 MMHG | DIASTOLIC BLOOD PRESSURE: 72 MMHG | WEIGHT: 202 LBS | HEIGHT: 68 IN

## 2023-11-13 DIAGNOSIS — Z79.4 UNCONTROLLED TYPE 2 DIABETES MELLITUS WITH HYPEROSMOLARITY WITHOUT COMA, WITH LONG-TERM CURRENT USE OF INSULIN (HCC): ICD-10-CM

## 2023-11-13 DIAGNOSIS — R53.83 FATIGUE, UNSPECIFIED TYPE: ICD-10-CM

## 2023-11-13 DIAGNOSIS — F51.01 PRIMARY INSOMNIA: ICD-10-CM

## 2023-11-13 DIAGNOSIS — E11.00 UNCONTROLLED TYPE 2 DIABETES MELLITUS WITH HYPEROSMOLARITY WITHOUT COMA, WITH LONG-TERM CURRENT USE OF INSULIN (HCC): ICD-10-CM

## 2023-11-13 DIAGNOSIS — I10 ESSENTIAL HYPERTENSION: ICD-10-CM

## 2023-11-13 DIAGNOSIS — E78.2 MIXED HYPERLIPIDEMIA: ICD-10-CM

## 2023-11-13 DIAGNOSIS — E55.9 VITAMIN D DEFICIENCY: ICD-10-CM

## 2023-11-13 DIAGNOSIS — F32.0 CURRENT MILD EPISODE OF MAJOR DEPRESSIVE DISORDER WITHOUT PRIOR EPISODE (HCC): ICD-10-CM

## 2023-11-13 DIAGNOSIS — F41.9 ANXIETY: ICD-10-CM

## 2023-11-13 DIAGNOSIS — E83.42 HYPOMAGNESEMIA: Primary | ICD-10-CM

## 2023-11-13 LAB
25(OH)D3 SERPL-MCNC: 14 NG/ML (ref 30–100)
ALBUMIN SERPL-MCNC: 3.8 G/DL (ref 3.2–4.6)
ALBUMIN/GLOB SERPL: 1.2 (ref 0.4–1.6)
ALP SERPL-CCNC: 71 U/L (ref 50–136)
ALT SERPL-CCNC: 36 U/L (ref 12–65)
ANION GAP SERPL CALC-SCNC: 7 MMOL/L (ref 2–11)
AST SERPL-CCNC: 22 U/L (ref 15–37)
BASOPHILS # BLD: 0.1 K/UL (ref 0–0.2)
BASOPHILS NFR BLD: 1 % (ref 0–2)
BILIRUB SERPL-MCNC: 0.2 MG/DL (ref 0.2–1.1)
BUN SERPL-MCNC: 13 MG/DL (ref 8–23)
CALCIUM SERPL-MCNC: 9.2 MG/DL (ref 8.3–10.4)
CHLORIDE SERPL-SCNC: 107 MMOL/L (ref 101–110)
CHOLEST SERPL-MCNC: 249 MG/DL
CO2 SERPL-SCNC: 25 MMOL/L (ref 21–32)
CREAT SERPL-MCNC: 1.7 MG/DL (ref 0.8–1.5)
DIFFERENTIAL METHOD BLD: ABNORMAL
EOSINOPHIL # BLD: 0.6 K/UL (ref 0–0.8)
EOSINOPHIL NFR BLD: 8 % (ref 0.5–7.8)
ERYTHROCYTE [DISTWIDTH] IN BLOOD BY AUTOMATED COUNT: 13.5 % (ref 11.9–14.6)
GLOBULIN SER CALC-MCNC: 3.2 G/DL (ref 2.8–4.5)
GLUCOSE SERPL-MCNC: 113 MG/DL (ref 65–100)
HCT VFR BLD AUTO: 42.7 % (ref 41.1–50.3)
HDLC SERPL-MCNC: 31 MG/DL (ref 40–60)
HDLC SERPL: 8
HGB BLD-MCNC: 14.3 G/DL (ref 13.6–17.2)
IMM GRANULOCYTES # BLD AUTO: 0 K/UL (ref 0–0.5)
IMM GRANULOCYTES NFR BLD AUTO: 0 % (ref 0–5)
LDLC SERPL CALC-MCNC: 144.6 MG/DL
LYMPHOCYTES # BLD: 2.3 K/UL (ref 0.5–4.6)
LYMPHOCYTES NFR BLD: 32 % (ref 13–44)
MAGNESIUM SERPL-MCNC: 2.3 MG/DL (ref 1.8–2.4)
MCH RBC QN AUTO: 30.6 PG (ref 26.1–32.9)
MCHC RBC AUTO-ENTMCNC: 33.5 G/DL (ref 31.4–35)
MCV RBC AUTO: 91.4 FL (ref 82–102)
MONOCYTES # BLD: 0.8 K/UL (ref 0.1–1.3)
MONOCYTES NFR BLD: 11 % (ref 4–12)
NEUTS SEG # BLD: 3.4 K/UL (ref 1.7–8.2)
NEUTS SEG NFR BLD: 48 % (ref 43–78)
NRBC # BLD: 0 K/UL (ref 0–0.2)
PLATELET # BLD AUTO: 286 K/UL (ref 150–450)
PMV BLD AUTO: 9.8 FL (ref 9.4–12.3)
POTASSIUM SERPL-SCNC: 4.3 MMOL/L (ref 3.5–5.1)
PROT SERPL-MCNC: 7 G/DL (ref 6.3–8.2)
RBC # BLD AUTO: 4.67 M/UL (ref 4.23–5.6)
SODIUM SERPL-SCNC: 139 MMOL/L (ref 133–143)
TRIGL SERPL-MCNC: 367 MG/DL (ref 35–150)
TSH, 3RD GENERATION: 2.02 UIU/ML (ref 0.36–3.74)
VLDLC SERPL CALC-MCNC: 73.4 MG/DL (ref 6–23)
WBC # BLD AUTO: 7.3 K/UL (ref 4.3–11.1)

## 2023-11-13 PROCEDURE — 3075F SYST BP GE 130 - 139MM HG: CPT | Performed by: FAMILY MEDICINE

## 2023-11-13 PROCEDURE — G8484 FLU IMMUNIZE NO ADMIN: HCPCS | Performed by: FAMILY MEDICINE

## 2023-11-13 PROCEDURE — 3017F COLORECTAL CA SCREEN DOC REV: CPT | Performed by: FAMILY MEDICINE

## 2023-11-13 PROCEDURE — G8427 DOCREV CUR MEDS BY ELIG CLIN: HCPCS | Performed by: FAMILY MEDICINE

## 2023-11-13 PROCEDURE — 2022F DILAT RTA XM EVC RTNOPTHY: CPT | Performed by: FAMILY MEDICINE

## 2023-11-13 PROCEDURE — 3078F DIAST BP <80 MM HG: CPT | Performed by: FAMILY MEDICINE

## 2023-11-13 PROCEDURE — G8417 CALC BMI ABV UP PARAM F/U: HCPCS | Performed by: FAMILY MEDICINE

## 2023-11-13 PROCEDURE — 1036F TOBACCO NON-USER: CPT | Performed by: FAMILY MEDICINE

## 2023-11-13 PROCEDURE — 1123F ACP DISCUSS/DSCN MKR DOCD: CPT | Performed by: FAMILY MEDICINE

## 2023-11-13 PROCEDURE — 99214 OFFICE O/P EST MOD 30 MIN: CPT | Performed by: FAMILY MEDICINE

## 2023-11-13 PROCEDURE — 3044F HG A1C LEVEL LT 7.0%: CPT | Performed by: FAMILY MEDICINE

## 2023-11-13 RX ORDER — LISINOPRIL AND HYDROCHLOROTHIAZIDE 12.5; 1 MG/1; MG/1
1 TABLET ORAL DAILY
Qty: 90 TABLET | Refills: 1 | Status: SHIPPED | OUTPATIENT
Start: 2023-11-13

## 2023-11-13 RX ORDER — CITALOPRAM 20 MG/1
20 TABLET ORAL DAILY
Qty: 90 TABLET | Refills: 1 | Status: SHIPPED | OUTPATIENT
Start: 2023-11-13

## 2023-11-13 RX ORDER — MIRTAZAPINE 15 MG/1
15 TABLET, FILM COATED ORAL NIGHTLY
Qty: 30 TABLET | Refills: 3 | Status: SHIPPED | OUTPATIENT
Start: 2023-11-13

## 2023-11-13 RX ORDER — ATORVASTATIN CALCIUM 80 MG/1
80 TABLET, FILM COATED ORAL DAILY
Qty: 90 TABLET | Refills: 1 | Status: SHIPPED | OUTPATIENT
Start: 2023-11-13

## 2023-11-13 ASSESSMENT — ENCOUNTER SYMPTOMS
SINUS PRESSURE: 0
ORTHOPNEA: 0
BLURRED VISION: 0
BACK PAIN: 0
APNEA: 0
BLOOD IN STOOL: 0
SHORTNESS OF BREATH: 0
ABDOMINAL PAIN: 0
SINUS PAIN: 0
CHEST TIGHTNESS: 0
EYE PAIN: 0
FACIAL SWELLING: 0
EYE REDNESS: 0
ANAL BLEEDING: 0
EYE ITCHING: 0
COUGH: 0
RHINORRHEA: 0
EYE DISCHARGE: 0
ABDOMINAL DISTENTION: 0

## 2023-11-13 ASSESSMENT — PATIENT HEALTH QUESTIONNAIRE - PHQ9
SUM OF ALL RESPONSES TO PHQ QUESTIONS 1-9: 0
SUM OF ALL RESPONSES TO PHQ QUESTIONS 1-9: 0
1. LITTLE INTEREST OR PLEASURE IN DOING THINGS: 0
2. FEELING DOWN, DEPRESSED OR HOPELESS: 0
SUM OF ALL RESPONSES TO PHQ QUESTIONS 1-9: 0
SUM OF ALL RESPONSES TO PHQ9 QUESTIONS 1 & 2: 0
SUM OF ALL RESPONSES TO PHQ QUESTIONS 1-9: 0

## 2023-11-14 LAB
EST. AVERAGE GLUCOSE BLD GHB EST-MCNC: 137 MG/DL
HBA1C MFR BLD: 6.4 % (ref 4.8–5.6)

## 2023-11-14 RX ORDER — ERGOCALCIFEROL 1.25 MG/1
50000 CAPSULE ORAL WEEKLY
Qty: 12 CAPSULE | Refills: 1 | Status: SHIPPED | OUTPATIENT
Start: 2023-11-14

## 2023-11-14 NOTE — TELEPHONE ENCOUNTER
----- Message from Loyda Ojeda MD sent at 11/14/2023  3:11 PM EST -----  Vit D is low, send 50,000 units per week x 6 months  Other labs are stable.

## 2024-03-14 ENCOUNTER — OFFICE VISIT (OUTPATIENT)
Dept: FAMILY MEDICINE CLINIC | Facility: CLINIC | Age: 74
End: 2024-03-14

## 2024-03-14 VITALS
HEIGHT: 68 IN | SYSTOLIC BLOOD PRESSURE: 138 MMHG | BODY MASS INDEX: 31.07 KG/M2 | WEIGHT: 205 LBS | DIASTOLIC BLOOD PRESSURE: 70 MMHG

## 2024-03-14 DIAGNOSIS — E78.2 MIXED HYPERLIPIDEMIA: ICD-10-CM

## 2024-03-14 DIAGNOSIS — Z00.00 ROUTINE GENERAL MEDICAL EXAMINATION AT A HEALTH CARE FACILITY: ICD-10-CM

## 2024-03-14 DIAGNOSIS — I10 ESSENTIAL HYPERTENSION: Primary | ICD-10-CM

## 2024-03-14 DIAGNOSIS — N18.31 STAGE 3A CHRONIC KIDNEY DISEASE (HCC): ICD-10-CM

## 2024-03-14 DIAGNOSIS — F41.9 ANXIETY: ICD-10-CM

## 2024-03-14 DIAGNOSIS — E11.00 UNCONTROLLED TYPE 2 DIABETES MELLITUS WITH HYPEROSMOLARITY WITHOUT COMA, WITH LONG-TERM CURRENT USE OF INSULIN (HCC): ICD-10-CM

## 2024-03-14 DIAGNOSIS — Z91.81 AT HIGH RISK FOR FALLS: ICD-10-CM

## 2024-03-14 DIAGNOSIS — R53.83 FATIGUE, UNSPECIFIED TYPE: ICD-10-CM

## 2024-03-14 DIAGNOSIS — E83.42 HYPOMAGNESEMIA: ICD-10-CM

## 2024-03-14 DIAGNOSIS — E55.9 VITAMIN D DEFICIENCY: ICD-10-CM

## 2024-03-14 DIAGNOSIS — F32.0 CURRENT MILD EPISODE OF MAJOR DEPRESSIVE DISORDER WITHOUT PRIOR EPISODE (HCC): ICD-10-CM

## 2024-03-14 DIAGNOSIS — Z79.4 UNCONTROLLED TYPE 2 DIABETES MELLITUS WITH HYPEROSMOLARITY WITHOUT COMA, WITH LONG-TERM CURRENT USE OF INSULIN (HCC): ICD-10-CM

## 2024-03-14 DIAGNOSIS — F51.01 PRIMARY INSOMNIA: ICD-10-CM

## 2024-03-14 DIAGNOSIS — E11.65 TYPE 2 DIABETES MELLITUS WITH HYPERGLYCEMIA, WITHOUT LONG-TERM CURRENT USE OF INSULIN (HCC): ICD-10-CM

## 2024-03-14 LAB
ALBUMIN SERPL-MCNC: 3.8 G/DL (ref 3.2–4.6)
ALBUMIN/GLOB SERPL: 1.2 (ref 0.4–1.6)
ALP SERPL-CCNC: 63 U/L (ref 50–136)
ALT SERPL-CCNC: 41 U/L (ref 12–65)
ANION GAP SERPL CALC-SCNC: 5 MMOL/L (ref 2–11)
AST SERPL-CCNC: 22 U/L (ref 15–37)
BASOPHILS # BLD: 0.1 K/UL (ref 0–0.2)
BASOPHILS NFR BLD: 2 % (ref 0–2)
BILIRUB SERPL-MCNC: 0.3 MG/DL (ref 0.2–1.1)
BUN SERPL-MCNC: 16 MG/DL (ref 8–23)
CALCIUM SERPL-MCNC: 10.3 MG/DL (ref 8.3–10.4)
CHLORIDE SERPL-SCNC: 106 MMOL/L (ref 103–113)
CHOLEST SERPL-MCNC: 253 MG/DL
CO2 SERPL-SCNC: 29 MMOL/L (ref 21–32)
CREAT SERPL-MCNC: 1.9 MG/DL (ref 0.8–1.5)
DIFFERENTIAL METHOD BLD: NORMAL
EOSINOPHIL # BLD: 0.5 K/UL (ref 0–0.8)
EOSINOPHIL NFR BLD: 6 % (ref 0.5–7.8)
ERYTHROCYTE [DISTWIDTH] IN BLOOD BY AUTOMATED COUNT: 13.2 % (ref 11.9–14.6)
GLOBULIN SER CALC-MCNC: 3.1 G/DL (ref 2.8–4.5)
GLUCOSE SERPL-MCNC: 175 MG/DL (ref 65–100)
HCT VFR BLD AUTO: 43.1 % (ref 41.1–50.3)
HDLC SERPL-MCNC: 38 MG/DL (ref 40–60)
HDLC SERPL: 6.7
HGB BLD-MCNC: 14.4 G/DL (ref 13.6–17.2)
IMM GRANULOCYTES # BLD AUTO: 0 K/UL (ref 0–0.5)
IMM GRANULOCYTES NFR BLD AUTO: 0 % (ref 0–5)
LDLC SERPL CALC-MCNC: 152.8 MG/DL
LYMPHOCYTES # BLD: 2 K/UL (ref 0.5–4.6)
LYMPHOCYTES NFR BLD: 29 % (ref 13–44)
MAGNESIUM SERPL-MCNC: 2.4 MG/DL (ref 1.8–2.4)
MCH RBC QN AUTO: 31.1 PG (ref 26.1–32.9)
MCHC RBC AUTO-ENTMCNC: 33.4 G/DL (ref 31.4–35)
MCV RBC AUTO: 93.1 FL (ref 82–102)
MONOCYTES # BLD: 0.7 K/UL (ref 0.1–1.3)
MONOCYTES NFR BLD: 10 % (ref 4–12)
NEUTS SEG # BLD: 3.7 K/UL (ref 1.7–8.2)
NEUTS SEG NFR BLD: 53 % (ref 43–78)
NRBC # BLD: 0 K/UL (ref 0–0.2)
PLATELET # BLD AUTO: 275 K/UL (ref 150–450)
PMV BLD AUTO: 9.9 FL (ref 9.4–12.3)
POTASSIUM SERPL-SCNC: 4 MMOL/L (ref 3.5–5.1)
PROT SERPL-MCNC: 6.9 G/DL (ref 6.3–8.2)
RBC # BLD AUTO: 4.63 M/UL (ref 4.23–5.6)
SODIUM SERPL-SCNC: 140 MMOL/L (ref 136–146)
TRIGL SERPL-MCNC: 311 MG/DL (ref 35–150)
TSH, 3RD GENERATION: 2.99 UIU/ML (ref 0.36–3.74)
VLDLC SERPL CALC-MCNC: 62.2 MG/DL (ref 6–23)
WBC # BLD AUTO: 7 K/UL (ref 4.3–11.1)

## 2024-03-14 RX ORDER — LISINOPRIL AND HYDROCHLOROTHIAZIDE 12.5; 1 MG/1; MG/1
1 TABLET ORAL DAILY
Qty: 90 TABLET | Refills: 1 | Status: SHIPPED | OUTPATIENT
Start: 2024-03-14

## 2024-03-14 RX ORDER — MIRTAZAPINE 15 MG/1
15 TABLET, FILM COATED ORAL NIGHTLY
Qty: 30 TABLET | Refills: 3 | Status: SHIPPED | OUTPATIENT
Start: 2024-03-14

## 2024-03-14 RX ORDER — ERGOCALCIFEROL 1.25 MG/1
50000 CAPSULE ORAL WEEKLY
Qty: 12 CAPSULE | Refills: 1 | Status: SHIPPED | OUTPATIENT
Start: 2024-03-14

## 2024-03-14 RX ORDER — ATORVASTATIN CALCIUM 80 MG/1
80 TABLET, FILM COATED ORAL DAILY
Qty: 90 TABLET | Refills: 1 | Status: SHIPPED | OUTPATIENT
Start: 2024-03-14

## 2024-03-14 RX ORDER — CITALOPRAM 20 MG/1
20 TABLET ORAL DAILY
Qty: 90 TABLET | Refills: 1 | Status: SHIPPED | OUTPATIENT
Start: 2024-03-14

## 2024-03-14 SDOH — ECONOMIC STABILITY: FOOD INSECURITY: WITHIN THE PAST 12 MONTHS, YOU WORRIED THAT YOUR FOOD WOULD RUN OUT BEFORE YOU GOT MONEY TO BUY MORE.: PATIENT DECLINED

## 2024-03-14 SDOH — ECONOMIC STABILITY: FOOD INSECURITY: WITHIN THE PAST 12 MONTHS, THE FOOD YOU BOUGHT JUST DIDN'T LAST AND YOU DIDN'T HAVE MONEY TO GET MORE.: PATIENT DECLINED

## 2024-03-14 SDOH — ECONOMIC STABILITY: HOUSING INSECURITY
IN THE LAST 12 MONTHS, WAS THERE A TIME WHEN YOU DID NOT HAVE A STEADY PLACE TO SLEEP OR SLEPT IN A SHELTER (INCLUDING NOW)?: PATIENT DECLINED

## 2024-03-14 SDOH — ECONOMIC STABILITY: INCOME INSECURITY: HOW HARD IS IT FOR YOU TO PAY FOR THE VERY BASICS LIKE FOOD, HOUSING, MEDICAL CARE, AND HEATING?: PATIENT DECLINED

## 2024-03-14 ASSESSMENT — ENCOUNTER SYMPTOMS
BLURRED VISION: 0
EYE ITCHING: 0
ANAL BLEEDING: 0
ABDOMINAL PAIN: 0
RHINORRHEA: 0
SINUS PRESSURE: 0
APNEA: 0
SINUS PAIN: 0
ABDOMINAL DISTENTION: 0
CHEST TIGHTNESS: 0
BACK PAIN: 0
EYE REDNESS: 0
EYE PAIN: 0
EYE DISCHARGE: 0
FACIAL SWELLING: 0
BLOOD IN STOOL: 0
COUGH: 0

## 2024-03-14 ASSESSMENT — PATIENT HEALTH QUESTIONNAIRE - PHQ9
SUM OF ALL RESPONSES TO PHQ QUESTIONS 1-9: 0
SUM OF ALL RESPONSES TO PHQ9 QUESTIONS 1 & 2: 0
2. FEELING DOWN, DEPRESSED OR HOPELESS: 0
SUM OF ALL RESPONSES TO PHQ QUESTIONS 1-9: 0
1. LITTLE INTEREST OR PLEASURE IN DOING THINGS: 0

## 2024-03-14 NOTE — PATIENT INSTRUCTIONS
range of motion in joints and muscles.  Includes upper arm stretches, calf stretches, and gentle yoga.  Aim for at least twice a week, preferably after your muscles are warmed up from other activities.  It can help you function better in daily life.  Balancing.  This helps you stay coordinated and have good posture.  Includes heel-to-toe walking, remberto chi, and certain types of yoga.  Aim for at least 3 days a week.  It can reduce your risk of falling.  Even if you have a hard time meeting the recommendations, it's better to be more active than less active. All activity done in each category counts toward your weekly total. You'd be surprised how daily things like carrying groceries, keeping up with grandchildren, and taking the stairs can add up.  What keeps you from being active?  If you've had a hard time being more active, you're not alone. Maybe you remember being able to do more. Or maybe you've never thought of yourself as being active. It's frustrating when you can't do the things you want. Being more active can help. What's holding you back?  Getting started.  Have a goal, but break it into easy tasks. Small steps build into big accomplishments.  Staying motivated.  If you feel like skipping your activity, remember your goal. Maybe you want to move better and stay independent. Every activity gets you one step closer.  Not feeling your best.  Start with 5 minutes of an activity you enjoy. Prove to yourself you can do it. As you get comfortable, increase your time.  You may not be where you want to be. But you're in the process of getting there. Everyone starts somewhere.  How can you find safe ways to stay active?  Talk with your doctor about any physical challenges you're facing. Make a plan with your doctor if you have a health problem or aren't sure how to get started with activity.  If you're already active, ask your doctor if there is anything you should change to stay safe as your body and health

## 2024-03-14 NOTE — PROGRESS NOTES
Medicare Annual Wellness Visit    Javid Sharp is here for Medicare AWV, Hypertension, Diabetes, Cholesterol Problem, and Depression    Assessment & Plan   Essential hypertension  -     lisinopril-hydroCHLOROthiazide (PRINZIDE;ZESTORETIC) 10-12.5 MG per tablet; Take 1 tablet by mouth daily, Disp-90 tablet, R-1Normal  -     CBC with Auto Differential; Future  -     Comprehensive Metabolic Panel; Future  Mixed hyperlipidemia  -     atorvastatin (LIPITOR) 80 MG tablet; Take 1 tablet by mouth daily, Disp-90 tablet, R-1Normal  -     Lipid Panel; Future  Current mild episode of major depressive disorder without prior episode (HCC)  -     citalopram (CELEXA) 20 MG tablet; Take 1 tablet by mouth daily, Disp-90 tablet, R-1Normal  -     mirtazapine (REMERON) 15 MG tablet; Take 1 tablet by mouth nightly, Disp-30 tablet, R-3Normal  Uncontrolled type 2 diabetes mellitus with hyperosmolarity without coma, with long-term current use of insulin (HCC)  -     metFORMIN (GLUCOPHAGE) 500 MG tablet; Take 1 tablet by mouth 2 times daily (with meals), Disp-180 tablet, R-1Normal  -     Hemoglobin A1C; Future  Anxiety  -     mirtazapine (REMERON) 15 MG tablet; Take 1 tablet by mouth nightly, Disp-30 tablet, R-3Normal  Primary insomnia  -     mirtazapine (REMERON) 15 MG tablet; Take 1 tablet by mouth nightly, Disp-30 tablet, R-3Normal  Type 2 diabetes mellitus with hyperglycemia, without long-term current use of insulin (HCC)  Assessment & Plan:   At goal, continue current medications  Stage 3a chronic kidney disease (HCC)  Assessment & Plan:   At goal, continue current medications  Vitamin D deficiency  -     vitamin D (ERGOCALCIFEROL) 1.25 MG (15630 UT) CAPS capsule; Take 1 capsule by mouth once a week, Disp-12 capsule, R-1Normal  -     Vitamin D 25 Hydroxy; Future  At high risk for falls  Hypomagnesemia  -     Magnesium; Future  Fatigue, unspecified type  -     TSH; Future  Routine general medical examination at a [facility] 
times daily (with meals), Disp-180 tablet, R-1Normal  -     Hemoglobin A1C; Future  5. Anxiety  -     mirtazapine (REMERON) 15 MG tablet; Take 1 tablet by mouth nightly, Disp-30 tablet, R-3Normal  6. Primary insomnia  -     mirtazapine (REMERON) 15 MG tablet; Take 1 tablet by mouth nightly, Disp-30 tablet, R-3Normal  7. Type 2 diabetes mellitus with hyperglycemia, without long-term current use of insulin (HCC)  Assessment & Plan:   At goal, continue current medications  8. Stage 3a chronic kidney disease (HCC)  Assessment & Plan:   At goal, continue current medications  9. Vitamin D deficiency  -     vitamin D (ERGOCALCIFEROL) 1.25 MG (58412 UT) CAPS capsule; Take 1 capsule by mouth once a week, Disp-12 capsule, R-1Normal  -     Vitamin D 25 Hydroxy; Future  10. At high risk for falls  11. Hypomagnesemia  -     Magnesium; Future  12. Fatigue, unspecified type  -     TSH; Future  13. Routine general medical examination at a health care facility     Meds sent  Labs sent  Encourage diet and exercise   Encourage weight loss,  Encourage home sugar monitoring  Call if problems  Recheck 4 months    Bjorn Durant MD

## 2024-03-15 LAB
25(OH)D3 SERPL-MCNC: 53.4 NG/ML (ref 30–100)
EST. AVERAGE GLUCOSE BLD GHB EST-MCNC: 166 MG/DL
HBA1C MFR BLD: 7.4 % (ref 4.8–5.6)

## 2024-07-17 ENCOUNTER — OFFICE VISIT (OUTPATIENT)
Dept: FAMILY MEDICINE CLINIC | Facility: CLINIC | Age: 74
End: 2024-07-17
Payer: MEDICARE

## 2024-07-17 VITALS
DIASTOLIC BLOOD PRESSURE: 76 MMHG | BODY MASS INDEX: 30.92 KG/M2 | SYSTOLIC BLOOD PRESSURE: 120 MMHG | HEIGHT: 68 IN | WEIGHT: 204 LBS

## 2024-07-17 DIAGNOSIS — Z86.73 HISTORY OF STROKE: ICD-10-CM

## 2024-07-17 DIAGNOSIS — F41.9 ANXIETY: ICD-10-CM

## 2024-07-17 DIAGNOSIS — F32.0 CURRENT MILD EPISODE OF MAJOR DEPRESSIVE DISORDER WITHOUT PRIOR EPISODE (HCC): ICD-10-CM

## 2024-07-17 DIAGNOSIS — E11.00 UNCONTROLLED TYPE 2 DIABETES MELLITUS WITH HYPEROSMOLARITY WITHOUT COMA, WITH LONG-TERM CURRENT USE OF INSULIN (HCC): ICD-10-CM

## 2024-07-17 DIAGNOSIS — I10 ESSENTIAL HYPERTENSION: ICD-10-CM

## 2024-07-17 DIAGNOSIS — E78.2 MIXED HYPERLIPIDEMIA: Primary | ICD-10-CM

## 2024-07-17 DIAGNOSIS — Z79.4 UNCONTROLLED TYPE 2 DIABETES MELLITUS WITH HYPEROSMOLARITY WITHOUT COMA, WITH LONG-TERM CURRENT USE OF INSULIN (HCC): ICD-10-CM

## 2024-07-17 DIAGNOSIS — N18.31 STAGE 3A CHRONIC KIDNEY DISEASE (HCC): ICD-10-CM

## 2024-07-17 DIAGNOSIS — E83.42 HYPOMAGNESEMIA: ICD-10-CM

## 2024-07-17 PROBLEM — I69.351 HEMIPLEGIA AND HEMIPARESIS FOLLOWING CEREBRAL INFARCTION AFFECTING RIGHT DOMINANT SIDE (HCC): Status: ACTIVE | Noted: 2017-09-08

## 2024-07-17 PROBLEM — M79.2 NEUROPATHIC PAIN: Status: ACTIVE | Noted: 2018-01-16

## 2024-07-17 LAB
ALBUMIN SERPL-MCNC: 3.8 G/DL (ref 3.2–4.6)
ALBUMIN/GLOB SERPL: 1.2 (ref 1–1.9)
ALP SERPL-CCNC: 63 U/L (ref 40–129)
ALT SERPL-CCNC: 38 U/L (ref 12–65)
ANION GAP SERPL CALC-SCNC: 12 MMOL/L (ref 9–18)
AST SERPL-CCNC: 30 U/L (ref 15–37)
BASOPHILS # BLD: 0.1 K/UL (ref 0–0.2)
BASOPHILS NFR BLD: 2 % (ref 0–2)
BILIRUB SERPL-MCNC: 0.2 MG/DL (ref 0–1.2)
BUN SERPL-MCNC: 16 MG/DL (ref 8–23)
CALCIUM SERPL-MCNC: 9.5 MG/DL (ref 8.8–10.2)
CHLORIDE SERPL-SCNC: 98 MMOL/L (ref 98–107)
CHOLEST SERPL-MCNC: 280 MG/DL (ref 0–200)
CO2 SERPL-SCNC: 25 MMOL/L (ref 20–28)
CREAT SERPL-MCNC: 1.56 MG/DL (ref 0.8–1.3)
DIFFERENTIAL METHOD BLD: NORMAL
EOSINOPHIL # BLD: 0.4 K/UL (ref 0–0.8)
EOSINOPHIL NFR BLD: 5 % (ref 0.5–7.8)
ERYTHROCYTE [DISTWIDTH] IN BLOOD BY AUTOMATED COUNT: 12.9 % (ref 11.9–14.6)
EST. AVERAGE GLUCOSE BLD GHB EST-MCNC: 212 MG/DL
GLOBULIN SER CALC-MCNC: 3.1 G/DL (ref 2.3–3.5)
GLUCOSE SERPL-MCNC: 190 MG/DL (ref 70–99)
HBA1C MFR BLD: 9 % (ref 0–5.6)
HCT VFR BLD AUTO: 45.4 % (ref 41.1–50.3)
HDLC SERPL-MCNC: 36 MG/DL (ref 40–60)
HDLC SERPL: 7.8 (ref 0–5)
HGB BLD-MCNC: 15.1 G/DL (ref 13.6–17.2)
IMM GRANULOCYTES # BLD AUTO: 0.1 K/UL (ref 0–0.5)
IMM GRANULOCYTES NFR BLD AUTO: 1 % (ref 0–5)
LDLC SERPL CALC-MCNC: ABNORMAL MG/DL (ref 0–100)
LDLC SERPL DIRECT ASSAY-MCNC: 184 MG/DL (ref 0–100)
LYMPHOCYTES # BLD: 2.1 K/UL (ref 0.5–4.6)
LYMPHOCYTES NFR BLD: 27 % (ref 13–44)
MAGNESIUM SERPL-MCNC: 1.9 MG/DL (ref 1.8–2.4)
MCH RBC QN AUTO: 31.1 PG (ref 26.1–32.9)
MCHC RBC AUTO-ENTMCNC: 33.3 G/DL (ref 31.4–35)
MCV RBC AUTO: 93.4 FL (ref 82–102)
MONOCYTES # BLD: 0.8 K/UL (ref 0.1–1.3)
MONOCYTES NFR BLD: 10 % (ref 4–12)
NEUTS SEG # BLD: 4.3 K/UL (ref 1.7–8.2)
NEUTS SEG NFR BLD: 55 % (ref 43–78)
NRBC # BLD: 0 K/UL (ref 0–0.2)
PLATELET # BLD AUTO: 280 K/UL (ref 150–450)
PMV BLD AUTO: 10.3 FL (ref 9.4–12.3)
POTASSIUM SERPL-SCNC: 4.6 MMOL/L (ref 3.5–5.1)
PROT SERPL-MCNC: 6.9 G/DL (ref 6.3–8.2)
RBC # BLD AUTO: 4.86 M/UL (ref 4.23–5.6)
SODIUM SERPL-SCNC: 135 MMOL/L (ref 136–145)
TRIGL SERPL-MCNC: 433 MG/DL (ref 0–150)
VLDLC SERPL CALC-MCNC: 87 MG/DL (ref 6–23)
WBC # BLD AUTO: 7.6 K/UL (ref 4.3–11.1)

## 2024-07-17 PROCEDURE — 3074F SYST BP LT 130 MM HG: CPT | Performed by: FAMILY MEDICINE

## 2024-07-17 PROCEDURE — G8427 DOCREV CUR MEDS BY ELIG CLIN: HCPCS | Performed by: FAMILY MEDICINE

## 2024-07-17 PROCEDURE — G8417 CALC BMI ABV UP PARAM F/U: HCPCS | Performed by: FAMILY MEDICINE

## 2024-07-17 PROCEDURE — 3051F HG A1C>EQUAL 7.0%<8.0%: CPT | Performed by: FAMILY MEDICINE

## 2024-07-17 PROCEDURE — 1123F ACP DISCUSS/DSCN MKR DOCD: CPT | Performed by: FAMILY MEDICINE

## 2024-07-17 PROCEDURE — 99214 OFFICE O/P EST MOD 30 MIN: CPT | Performed by: FAMILY MEDICINE

## 2024-07-17 PROCEDURE — 2022F DILAT RTA XM EVC RTNOPTHY: CPT | Performed by: FAMILY MEDICINE

## 2024-07-17 PROCEDURE — 1036F TOBACCO NON-USER: CPT | Performed by: FAMILY MEDICINE

## 2024-07-17 PROCEDURE — 3017F COLORECTAL CA SCREEN DOC REV: CPT | Performed by: FAMILY MEDICINE

## 2024-07-17 PROCEDURE — 3078F DIAST BP <80 MM HG: CPT | Performed by: FAMILY MEDICINE

## 2024-07-17 ASSESSMENT — ENCOUNTER SYMPTOMS
CHEST TIGHTNESS: 0
BLURRED VISION: 0
COUGH: 0
ABDOMINAL PAIN: 0
EYE PAIN: 0
ANAL BLEEDING: 0
APNEA: 0
SINUS PRESSURE: 0
ABDOMINAL DISTENTION: 0
ORTHOPNEA: 0
EYE ITCHING: 0
EYE DISCHARGE: 0
BACK PAIN: 0
RHINORRHEA: 0
FACIAL SWELLING: 0
SINUS PAIN: 0
BLOOD IN STOOL: 0
EYE REDNESS: 0

## 2024-07-17 NOTE — PROGRESS NOTES
Outpatient Medications   Medication Sig Dispense Refill   • OXYGEN by Other route Use as instructed. Use 3L at rest and sleep and 4L with exertion for goal oxygen saturation >90%     • atorvastatin (LIPITOR) 80 MG tablet Take 1 tablet by mouth daily 90 tablet 1   • citalopram (CELEXA) 20 MG tablet Take 1 tablet by mouth daily 90 tablet 1   • lisinopril-hydroCHLOROthiazide (PRINZIDE;ZESTORETIC) 10-12.5 MG per tablet Take 1 tablet by mouth daily 90 tablet 1   • metFORMIN (GLUCOPHAGE) 500 MG tablet Take 1 tablet by mouth 2 times daily (with meals) 180 tablet 1   • mirtazapine (REMERON) 15 MG tablet Take 1 tablet by mouth nightly 30 tablet 3   • aspirin 81 MG chewable tablet Take 2 tablets by mouth 2 times daily     • cyanocobalamin 1000 MCG/ML injection One injection once a week for four weeks, then one injection once a month for one year.     • vitamin D (ERGOCALCIFEROL) 1.25 MG (58554 UT) CAPS capsule Take 1 capsule by mouth once a week (Patient not taking: Reported on 7/17/2024) 12 capsule 1     No current facility-administered medications for this visit.       Vitals:    /76   Ht 1.727 m (5' 7.99\")   Wt 92.5 kg (204 lb)   BMI 31.03 kg/m²     Physical Exam:  Physical Exam  Vitals and nursing note reviewed.   Constitutional:       Appearance: Normal appearance. He is not ill-appearing or diaphoretic.   HENT:      Head: Normocephalic and atraumatic.      Nose: Nose normal.   Eyes:      Pupils: Pupils are equal, round, and reactive to light.   Cardiovascular:      Rate and Rhythm: Normal rate and regular rhythm.      Pulses: Normal pulses.      Heart sounds: Normal heart sounds.   Pulmonary:      Effort: Pulmonary effort is normal.      Breath sounds: Normal breath sounds.   Musculoskeletal:         General: No swelling or tenderness.      Cervical back: Normal range of motion and neck supple.      Comments: Limited ROM on right side due to contractures from his hemiparesis, still able to walk with a cane and

## 2024-07-18 DIAGNOSIS — Z79.4 UNCONTROLLED TYPE 2 DIABETES MELLITUS WITH HYPEROSMOLARITY WITHOUT COMA, WITH LONG-TERM CURRENT USE OF INSULIN (HCC): Primary | ICD-10-CM

## 2024-07-18 DIAGNOSIS — E11.00 UNCONTROLLED TYPE 2 DIABETES MELLITUS WITH HYPEROSMOLARITY WITHOUT COMA, WITH LONG-TERM CURRENT USE OF INSULIN (HCC): Primary | ICD-10-CM

## 2024-07-23 DIAGNOSIS — Z86.73 HISTORY OF STROKE: Primary | ICD-10-CM

## 2024-11-26 ENCOUNTER — OFFICE VISIT (OUTPATIENT)
Dept: FAMILY MEDICINE CLINIC | Facility: CLINIC | Age: 74
End: 2024-11-26

## 2024-11-26 VITALS
BODY MASS INDEX: 29.7 KG/M2 | SYSTOLIC BLOOD PRESSURE: 126 MMHG | DIASTOLIC BLOOD PRESSURE: 62 MMHG | HEIGHT: 68 IN | WEIGHT: 196 LBS

## 2024-11-26 DIAGNOSIS — F32.0 CURRENT MILD EPISODE OF MAJOR DEPRESSIVE DISORDER WITHOUT PRIOR EPISODE (HCC): ICD-10-CM

## 2024-11-26 DIAGNOSIS — Z79.4 UNCONTROLLED TYPE 2 DIABETES MELLITUS WITH HYPEROSMOLARITY WITHOUT COMA, WITH LONG-TERM CURRENT USE OF INSULIN (HCC): Primary | ICD-10-CM

## 2024-11-26 DIAGNOSIS — E78.2 MIXED HYPERLIPIDEMIA: ICD-10-CM

## 2024-11-26 DIAGNOSIS — E11.00 UNCONTROLLED TYPE 2 DIABETES MELLITUS WITH HYPEROSMOLARITY WITHOUT COMA, WITH LONG-TERM CURRENT USE OF INSULIN (HCC): Primary | ICD-10-CM

## 2024-11-26 DIAGNOSIS — E55.9 VITAMIN D DEFICIENCY: ICD-10-CM

## 2024-11-26 DIAGNOSIS — I69.351 HEMIPLEGIA AND HEMIPARESIS FOLLOWING CEREBRAL INFARCTION AFFECTING RIGHT DOMINANT SIDE (HCC): ICD-10-CM

## 2024-11-26 DIAGNOSIS — E83.42 HYPOMAGNESEMIA: ICD-10-CM

## 2024-11-26 DIAGNOSIS — F51.01 PRIMARY INSOMNIA: ICD-10-CM

## 2024-11-26 DIAGNOSIS — F41.9 ANXIETY: ICD-10-CM

## 2024-11-26 DIAGNOSIS — I10 ESSENTIAL HYPERTENSION: ICD-10-CM

## 2024-11-26 LAB
ALBUMIN SERPL-MCNC: 3.7 G/DL (ref 3.2–4.6)
ALBUMIN/GLOB SERPL: 1.1 (ref 1–1.9)
ALP SERPL-CCNC: 73 U/L (ref 40–129)
ALT SERPL-CCNC: 30 U/L (ref 8–55)
ANION GAP SERPL CALC-SCNC: 12 MMOL/L (ref 7–16)
AST SERPL-CCNC: 27 U/L (ref 15–37)
BASOPHILS # BLD: 0.1 K/UL (ref 0–0.2)
BASOPHILS NFR BLD: 1 % (ref 0–2)
BILIRUB SERPL-MCNC: 0.4 MG/DL (ref 0–1.2)
BUN SERPL-MCNC: 18 MG/DL (ref 8–23)
CALCIUM SERPL-MCNC: 9.6 MG/DL (ref 8.8–10.2)
CHLORIDE SERPL-SCNC: 98 MMOL/L (ref 98–107)
CHOLEST SERPL-MCNC: 97 MG/DL (ref 0–200)
CO2 SERPL-SCNC: 26 MMOL/L (ref 20–29)
CREAT SERPL-MCNC: 1.56 MG/DL (ref 0.8–1.3)
DIFFERENTIAL METHOD BLD: NORMAL
EOSINOPHIL # BLD: 0.3 K/UL (ref 0–0.8)
EOSINOPHIL NFR BLD: 4 % (ref 0.5–7.8)
ERYTHROCYTE [DISTWIDTH] IN BLOOD BY AUTOMATED COUNT: 13.1 % (ref 11.9–14.6)
EST. AVERAGE GLUCOSE BLD GHB EST-MCNC: 167 MG/DL
GLOBULIN SER CALC-MCNC: 3.2 G/DL (ref 2.3–3.5)
GLUCOSE SERPL-MCNC: 118 MG/DL (ref 70–99)
HBA1C MFR BLD: 7.4 % (ref 0–5.6)
HCT VFR BLD AUTO: 44.8 % (ref 41.1–50.3)
HDLC SERPL-MCNC: 31 MG/DL (ref 40–60)
HDLC SERPL: 3.1 (ref 0–5)
HGB BLD-MCNC: 14.7 G/DL (ref 13.6–17.2)
IMM GRANULOCYTES # BLD AUTO: 0 K/UL (ref 0–0.5)
IMM GRANULOCYTES NFR BLD AUTO: 0 % (ref 0–5)
LDLC SERPL CALC-MCNC: 40 MG/DL (ref 0–100)
LYMPHOCYTES # BLD: 2 K/UL (ref 0.5–4.6)
LYMPHOCYTES NFR BLD: 25 % (ref 13–44)
MAGNESIUM SERPL-MCNC: 2 MG/DL (ref 1.8–2.4)
MCH RBC QN AUTO: 30.4 PG (ref 26.1–32.9)
MCHC RBC AUTO-ENTMCNC: 32.8 G/DL (ref 31.4–35)
MCV RBC AUTO: 92.8 FL (ref 82–102)
MONOCYTES # BLD: 0.7 K/UL (ref 0.1–1.3)
MONOCYTES NFR BLD: 9 % (ref 4–12)
NEUTS SEG # BLD: 4.8 K/UL (ref 1.7–8.2)
NEUTS SEG NFR BLD: 61 % (ref 43–78)
NRBC # BLD: 0 K/UL (ref 0–0.2)
PLATELET # BLD AUTO: 304 K/UL (ref 150–450)
PMV BLD AUTO: 9.6 FL (ref 9.4–12.3)
POTASSIUM SERPL-SCNC: 4.4 MMOL/L (ref 3.5–5.1)
PROT SERPL-MCNC: 6.9 G/DL (ref 6.3–8.2)
RBC # BLD AUTO: 4.83 M/UL (ref 4.23–5.6)
SODIUM SERPL-SCNC: 136 MMOL/L (ref 136–145)
TRIGL SERPL-MCNC: 129 MG/DL (ref 0–150)
VLDLC SERPL CALC-MCNC: 26 MG/DL (ref 6–23)
WBC # BLD AUTO: 7.9 K/UL (ref 4.3–11.1)

## 2024-11-26 RX ORDER — LISINOPRIL AND HYDROCHLOROTHIAZIDE 10; 12.5 MG/1; MG/1
1 TABLET ORAL DAILY
Qty: 90 TABLET | Refills: 1 | Status: SHIPPED | OUTPATIENT
Start: 2024-11-26 | End: 2025-05-25

## 2024-11-26 RX ORDER — ERGOCALCIFEROL 1.25 MG/1
50000 CAPSULE, LIQUID FILLED ORAL WEEKLY
Qty: 12 CAPSULE | Refills: 1 | Status: SHIPPED | OUTPATIENT
Start: 2024-11-26 | End: 2025-05-13

## 2024-11-26 RX ORDER — ATORVASTATIN CALCIUM 80 MG/1
80 TABLET, FILM COATED ORAL DAILY
Qty: 90 TABLET | Refills: 1 | Status: SHIPPED | OUTPATIENT
Start: 2024-11-26 | End: 2025-05-25

## 2024-11-26 RX ORDER — MIRTAZAPINE 15 MG/1
15 TABLET, FILM COATED ORAL NIGHTLY
Qty: 90 TABLET | Refills: 1 | Status: SHIPPED | OUTPATIENT
Start: 2024-11-26 | End: 2025-05-25

## 2024-11-26 RX ORDER — CITALOPRAM HYDROBROMIDE 20 MG/1
20 TABLET ORAL DAILY
Qty: 90 TABLET | Refills: 1 | Status: SHIPPED | OUTPATIENT
Start: 2024-11-26 | End: 2025-05-25

## 2024-11-26 ASSESSMENT — ENCOUNTER SYMPTOMS
EYE REDNESS: 0
SINUS PAIN: 0
BLURRED VISION: 0
BLOOD IN STOOL: 0
RHINORRHEA: 0
FACIAL SWELLING: 0
EYE PAIN: 0
ABDOMINAL PAIN: 0
ORTHOPNEA: 0
EYE ITCHING: 0
BACK PAIN: 0
CHEST TIGHTNESS: 0
COUGH: 0
ABDOMINAL DISTENTION: 0
EYE DISCHARGE: 0
ANAL BLEEDING: 0
SINUS PRESSURE: 0
APNEA: 0

## 2024-11-26 NOTE — PROGRESS NOTES
[facility]  _______________________________________  Bjorn Birmingham MD                 [address] [place] [address]        Nasir Fisher [place] [address]                                                                                    Phone: [phone]                                                                                    Fax: [phone]    Javid Sharp is a 73 y.o. male who is seen for evaluation of   Chief Complaint   Patient presents with   • Hypertension   • Cholesterol Problem   • Diabetes     BS yesterday 131.   • Anxiety       HPI:   Hypertension  This is a chronic problem. The current episode started more than 1 year ago. The problem is unchanged. The problem is controlled. Associated symptoms include anxiety and malaise/fatigue. Pertinent negatives include no blurred vision, chest pain, headaches, neck pain, orthopnea, peripheral edema, PND or sweats. (on meds, need refills and labs, no side effect noted.   )   Diabetes  He presents for his follow-up diabetic visit. He has type 2 diabetes mellitus. Pertinent negatives for hypoglycemia include no confusion, dizziness, headaches, nervousness/anxiousness, pallor or sweats. Pertinent negatives for diabetes include no blurred vision, no chest pain, no fatigue, no foot ulcerations, no polydipsia, no polyphagia and no polyuria. (on meds, need refills and labs, no side effect noted.   )   Anxiety  Presents for follow-up visit. Symptoms include insomnia. Patient reports no chest pain, confusion, dizziness or nervous/anxious behavior. Episode frequency: on meds, need refills adn labs, no side effect noted.       Hyperlipidemia  This is a chronic problem. Lipid results: on meds, need refills and labs. Pertinent negatives include no chest pain or myalgias.   Insomnia  Episode onset: on meds, eed refills adn labs. Pertinent negatives include no abdominal pain, arthralgias, chest pain, chills,

## 2024-11-27 DIAGNOSIS — E11.00 UNCONTROLLED TYPE 2 DIABETES MELLITUS WITH HYPEROSMOLARITY WITHOUT COMA, WITH LONG-TERM CURRENT USE OF INSULIN (HCC): ICD-10-CM

## 2024-11-27 DIAGNOSIS — Z79.4 UNCONTROLLED TYPE 2 DIABETES MELLITUS WITH HYPEROSMOLARITY WITHOUT COMA, WITH LONG-TERM CURRENT USE OF INSULIN (HCC): ICD-10-CM

## 2025-02-10 ENCOUNTER — APPOINTMENT (OUTPATIENT)
Dept: CT IMAGING | Age: 75
DRG: 065 | End: 2025-02-10
Payer: MEDICARE

## 2025-02-10 ENCOUNTER — APPOINTMENT (OUTPATIENT)
Dept: MRI IMAGING | Age: 75
DRG: 065 | End: 2025-02-10
Payer: MEDICARE

## 2025-02-10 ENCOUNTER — HOSPITAL ENCOUNTER (INPATIENT)
Age: 75
LOS: 2 days | Discharge: NURSING, REHAB OR LONG-TERM CARE | DRG: 065 | End: 2025-02-12
Attending: GENERAL PRACTICE
Payer: MEDICARE

## 2025-02-10 DIAGNOSIS — I63.9 CEREBROVASCULAR ACCIDENT (CVA), UNSPECIFIED MECHANISM (HCC): ICD-10-CM

## 2025-02-10 DIAGNOSIS — E11.00 UNCONTROLLED TYPE 2 DIABETES MELLITUS WITH HYPEROSMOLARITY WITHOUT COMA, WITH LONG-TERM CURRENT USE OF INSULIN (HCC): ICD-10-CM

## 2025-02-10 DIAGNOSIS — I63.9 ISCHEMIC STROKE OF FRONTAL LOBE (HCC): ICD-10-CM

## 2025-02-10 DIAGNOSIS — Z79.4 UNCONTROLLED TYPE 2 DIABETES MELLITUS WITH HYPEROSMOLARITY WITHOUT COMA, WITH LONG-TERM CURRENT USE OF INSULIN (HCC): ICD-10-CM

## 2025-02-10 DIAGNOSIS — I63.9 ACUTE CVA (CEREBROVASCULAR ACCIDENT) (HCC): Primary | ICD-10-CM

## 2025-02-10 PROBLEM — N18.32 CKD STAGE 3B, GFR 30-44 ML/MIN (HCC): Status: ACTIVE | Noted: 2022-06-29

## 2025-02-10 PROBLEM — E11.29 TYPE 2 DIABETES MELLITUS WITH KIDNEY COMPLICATION, WITHOUT LONG-TERM CURRENT USE OF INSULIN (HCC): Status: ACTIVE | Noted: 2025-02-10

## 2025-02-10 PROBLEM — Z66 DNR (DO NOT RESUSCITATE): Status: ACTIVE | Noted: 2025-02-10

## 2025-02-10 PROBLEM — R53.1 ACUTE LEFT-SIDED WEAKNESS: Status: ACTIVE | Noted: 2025-02-10

## 2025-02-10 PROBLEM — I69.30 HISTORY OF CVA WITH RESIDUAL DEFICIT: Status: ACTIVE | Noted: 2024-07-17

## 2025-02-10 LAB
ALBUMIN SERPL-MCNC: 3.7 G/DL (ref 3.2–4.6)
ALBUMIN/GLOB SERPL: 1.3 (ref 1–1.9)
ALP SERPL-CCNC: 89 U/L (ref 40–129)
ALT SERPL-CCNC: 43 U/L (ref 8–55)
ANION GAP SERPL CALC-SCNC: 13 MMOL/L (ref 7–16)
AST SERPL-CCNC: 29 U/L (ref 15–37)
BASOPHILS # BLD: 0.07 K/UL (ref 0–0.2)
BASOPHILS NFR BLD: 0.9 % (ref 0–2)
BILIRUB SERPL-MCNC: 0.4 MG/DL (ref 0–1.2)
BUN SERPL-MCNC: 16 MG/DL (ref 8–23)
CALCIUM SERPL-MCNC: 9.5 MG/DL (ref 8.8–10.2)
CHLORIDE SERPL-SCNC: 102 MMOL/L (ref 98–107)
CHOLEST SERPL-MCNC: 124 MG/DL (ref 0–200)
CO2 SERPL-SCNC: 26 MMOL/L (ref 20–29)
CREAT SERPL-MCNC: 1.73 MG/DL (ref 0.8–1.3)
DIFFERENTIAL METHOD BLD: ABNORMAL
EKG ATRIAL RATE: 71 BPM
EKG DIAGNOSIS: NORMAL
EKG P AXIS: 47 DEGREES
EKG P-R INTERVAL: 172 MS
EKG Q-T INTERVAL: 411 MS
EKG QRS DURATION: 100 MS
EKG QTC CALCULATION (BAZETT): 450 MS
EKG R AXIS: 67 DEGREES
EKG T AXIS: 26 DEGREES
EKG VENTRICULAR RATE: 72 BPM
EOSINOPHIL # BLD: 0.31 K/UL (ref 0–0.8)
EOSINOPHIL NFR BLD: 3.8 % (ref 0.5–7.8)
ERYTHROCYTE [DISTWIDTH] IN BLOOD BY AUTOMATED COUNT: 13.3 % (ref 11.9–14.6)
EST. AVERAGE GLUCOSE BLD GHB EST-MCNC: 246 MG/DL
GLOBULIN SER CALC-MCNC: 2.9 G/DL (ref 2.3–3.5)
GLUCOSE BLD STRIP.AUTO-MCNC: 114 MG/DL (ref 65–100)
GLUCOSE BLD STRIP.AUTO-MCNC: 146 MG/DL (ref 65–100)
GLUCOSE SERPL-MCNC: 315 MG/DL (ref 70–99)
HBA1C MFR BLD: 10.2 % (ref 0–5.6)
HCT VFR BLD AUTO: 43.1 % (ref 41.1–50.3)
HDLC SERPL-MCNC: 31 MG/DL (ref 40–60)
HDLC SERPL: 4 (ref 0–5)
HGB BLD-MCNC: 14.4 G/DL (ref 13.6–17.2)
IMM GRANULOCYTES # BLD AUTO: 0.03 K/UL (ref 0–0.5)
IMM GRANULOCYTES NFR BLD AUTO: 0.4 % (ref 0–5)
INR PPP: 0.9
LDLC SERPL CALC-MCNC: 60 MG/DL (ref 0–100)
LYMPHOCYTES # BLD: 1.55 K/UL (ref 0.5–4.6)
LYMPHOCYTES NFR BLD: 18.9 % (ref 13–44)
MCH RBC QN AUTO: 30.6 PG (ref 26.1–32.9)
MCHC RBC AUTO-ENTMCNC: 33.4 G/DL (ref 31.4–35)
MCV RBC AUTO: 91.7 FL (ref 82–102)
MONOCYTES # BLD: 0.76 K/UL (ref 0.1–1.3)
MONOCYTES NFR BLD: 9.3 % (ref 4–12)
NEUTS SEG # BLD: 5.46 K/UL (ref 1.7–8.2)
NEUTS SEG NFR BLD: 66.7 % (ref 43–78)
NRBC # BLD: 0 K/UL (ref 0–0.2)
PLATELET # BLD AUTO: 256 K/UL (ref 150–450)
PMV BLD AUTO: 9.2 FL (ref 9.4–12.3)
POTASSIUM SERPL-SCNC: 4.3 MMOL/L (ref 3.5–5.1)
PROT SERPL-MCNC: 6.6 G/DL (ref 6.3–8.2)
PROTHROMBIN TIME: 13.1 SEC (ref 11.3–14.9)
RBC # BLD AUTO: 4.7 M/UL (ref 4.23–5.6)
SERVICE CMNT-IMP: ABNORMAL
SERVICE CMNT-IMP: ABNORMAL
SODIUM SERPL-SCNC: 141 MMOL/L (ref 136–145)
TRIGL SERPL-MCNC: 167 MG/DL (ref 0–150)
VLDLC SERPL CALC-MCNC: 33 MG/DL (ref 6–23)
WBC # BLD AUTO: 8.2 K/UL (ref 4.3–11.1)

## 2025-02-10 PROCEDURE — 70498 CT ANGIOGRAPHY NECK: CPT

## 2025-02-10 PROCEDURE — 70450 CT HEAD/BRAIN W/O DYE: CPT

## 2025-02-10 PROCEDURE — 93005 ELECTROCARDIOGRAM TRACING: CPT | Performed by: GENERAL PRACTICE

## 2025-02-10 PROCEDURE — 70551 MRI BRAIN STEM W/O DYE: CPT

## 2025-02-10 PROCEDURE — 99222 1ST HOSP IP/OBS MODERATE 55: CPT | Performed by: PSYCHIATRY & NEUROLOGY

## 2025-02-10 PROCEDURE — 85025 COMPLETE CBC W/AUTO DIFF WBC: CPT

## 2025-02-10 PROCEDURE — 82962 GLUCOSE BLOOD TEST: CPT

## 2025-02-10 PROCEDURE — 6370000000 HC RX 637 (ALT 250 FOR IP): Performed by: PSYCHIATRY & NEUROLOGY

## 2025-02-10 PROCEDURE — 80061 LIPID PANEL: CPT

## 2025-02-10 PROCEDURE — 6370000000 HC RX 637 (ALT 250 FOR IP)

## 2025-02-10 PROCEDURE — 93010 ELECTROCARDIOGRAM REPORT: CPT | Performed by: INTERNAL MEDICINE

## 2025-02-10 PROCEDURE — 80053 COMPREHEN METABOLIC PANEL: CPT

## 2025-02-10 PROCEDURE — 1100000003 HC PRIVATE W/ TELEMETRY

## 2025-02-10 PROCEDURE — 85610 PROTHROMBIN TIME: CPT

## 2025-02-10 PROCEDURE — 99285 EMERGENCY DEPT VISIT HI MDM: CPT

## 2025-02-10 PROCEDURE — 2500000003 HC RX 250 WO HCPCS

## 2025-02-10 PROCEDURE — 83036 HEMOGLOBIN GLYCOSYLATED A1C: CPT

## 2025-02-10 PROCEDURE — 6360000004 HC RX CONTRAST MEDICATION: Performed by: GENERAL PRACTICE

## 2025-02-10 RX ORDER — BISACODYL 10 MG
10 SUPPOSITORY, RECTAL RECTAL DAILY PRN
Status: DISCONTINUED | OUTPATIENT
Start: 2025-02-10 | End: 2025-02-12 | Stop reason: HOSPADM

## 2025-02-10 RX ORDER — ATORVASTATIN CALCIUM 80 MG/1
80 TABLET, FILM COATED ORAL NIGHTLY
Status: DISCONTINUED | OUTPATIENT
Start: 2025-02-10 | End: 2025-02-12 | Stop reason: HOSPADM

## 2025-02-10 RX ORDER — ASPIRIN 81 MG/1
81 TABLET, CHEWABLE ORAL DAILY
Status: DISCONTINUED | OUTPATIENT
Start: 2025-02-11 | End: 2025-02-12 | Stop reason: HOSPADM

## 2025-02-10 RX ORDER — ONDANSETRON 4 MG/1
4 TABLET, ORALLY DISINTEGRATING ORAL EVERY 8 HOURS PRN
Status: DISCONTINUED | OUTPATIENT
Start: 2025-02-10 | End: 2025-02-12 | Stop reason: HOSPADM

## 2025-02-10 RX ORDER — ACETAMINOPHEN 325 MG/1
650 TABLET ORAL EVERY 4 HOURS PRN
Status: DISCONTINUED | OUTPATIENT
Start: 2025-02-10 | End: 2025-02-12 | Stop reason: HOSPADM

## 2025-02-10 RX ORDER — ASPIRIN 81 MG/1
81 TABLET, CHEWABLE ORAL DAILY
Status: DISCONTINUED | OUTPATIENT
Start: 2025-02-10 | End: 2025-02-10

## 2025-02-10 RX ORDER — CITALOPRAM HYDROBROMIDE 20 MG/1
20 TABLET ORAL DAILY
Status: DISCONTINUED | OUTPATIENT
Start: 2025-02-11 | End: 2025-02-12 | Stop reason: HOSPADM

## 2025-02-10 RX ORDER — POLYETHYLENE GLYCOL 3350 17 G/17G
17 POWDER, FOR SOLUTION ORAL DAILY PRN
Status: DISCONTINUED | OUTPATIENT
Start: 2025-02-10 | End: 2025-02-12 | Stop reason: HOSPADM

## 2025-02-10 RX ORDER — SODIUM CHLORIDE 0.9 % (FLUSH) 0.9 %
5-40 SYRINGE (ML) INJECTION PRN
Status: DISCONTINUED | OUTPATIENT
Start: 2025-02-10 | End: 2025-02-12 | Stop reason: HOSPADM

## 2025-02-10 RX ORDER — IBUPROFEN 600 MG/1
1 TABLET ORAL PRN
Status: DISCONTINUED | OUTPATIENT
Start: 2025-02-10 | End: 2025-02-12 | Stop reason: HOSPADM

## 2025-02-10 RX ORDER — INSULIN LISPRO 100 [IU]/ML
0-4 INJECTION, SOLUTION INTRAVENOUS; SUBCUTANEOUS
Status: DISCONTINUED | OUTPATIENT
Start: 2025-02-10 | End: 2025-02-12 | Stop reason: HOSPADM

## 2025-02-10 RX ORDER — CLOPIDOGREL BISULFATE 75 MG/1
300 TABLET ORAL ONCE
Status: COMPLETED | OUTPATIENT
Start: 2025-02-10 | End: 2025-02-10

## 2025-02-10 RX ORDER — LISINOPRIL AND HYDROCHLOROTHIAZIDE 10; 12.5 MG/1; MG/1
1 TABLET ORAL DAILY
Status: DISCONTINUED | OUTPATIENT
Start: 2025-02-10 | End: 2025-02-11

## 2025-02-10 RX ORDER — SODIUM CHLORIDE 9 MG/ML
INJECTION, SOLUTION INTRAVENOUS PRN
Status: DISCONTINUED | OUTPATIENT
Start: 2025-02-10 | End: 2025-02-12 | Stop reason: HOSPADM

## 2025-02-10 RX ORDER — MIRTAZAPINE 15 MG/1
15 TABLET, FILM COATED ORAL NIGHTLY
Status: DISCONTINUED | OUTPATIENT
Start: 2025-02-10 | End: 2025-02-12 | Stop reason: HOSPADM

## 2025-02-10 RX ORDER — IOPAMIDOL 755 MG/ML
60 INJECTION, SOLUTION INTRAVASCULAR
Status: COMPLETED | OUTPATIENT
Start: 2025-02-10 | End: 2025-02-10

## 2025-02-10 RX ORDER — DEXTROSE MONOHYDRATE 100 MG/ML
INJECTION, SOLUTION INTRAVENOUS CONTINUOUS PRN
Status: DISCONTINUED | OUTPATIENT
Start: 2025-02-10 | End: 2025-02-12 | Stop reason: HOSPADM

## 2025-02-10 RX ORDER — ONDANSETRON 2 MG/ML
4 INJECTION INTRAMUSCULAR; INTRAVENOUS EVERY 6 HOURS PRN
Status: DISCONTINUED | OUTPATIENT
Start: 2025-02-10 | End: 2025-02-12 | Stop reason: HOSPADM

## 2025-02-10 RX ORDER — ACETAMINOPHEN 650 MG/1
650 SUPPOSITORY RECTAL EVERY 4 HOURS PRN
Status: DISCONTINUED | OUTPATIENT
Start: 2025-02-10 | End: 2025-02-12 | Stop reason: HOSPADM

## 2025-02-10 RX ORDER — SODIUM CHLORIDE 0.9 % (FLUSH) 0.9 %
5-40 SYRINGE (ML) INJECTION EVERY 12 HOURS SCHEDULED
Status: DISCONTINUED | OUTPATIENT
Start: 2025-02-10 | End: 2025-02-12 | Stop reason: HOSPADM

## 2025-02-10 RX ORDER — LABETALOL HYDROCHLORIDE 5 MG/ML
10 INJECTION, SOLUTION INTRAVENOUS
Status: DISCONTINUED | OUTPATIENT
Start: 2025-02-10 | End: 2025-02-12 | Stop reason: HOSPADM

## 2025-02-10 RX ORDER — CLOPIDOGREL BISULFATE 75 MG/1
75 TABLET ORAL DAILY
Status: DISCONTINUED | OUTPATIENT
Start: 2025-02-11 | End: 2025-02-12 | Stop reason: HOSPADM

## 2025-02-10 RX ADMIN — IOPAMIDOL 60 ML: 755 INJECTION, SOLUTION INTRAVENOUS at 15:22

## 2025-02-10 RX ADMIN — MIRTAZAPINE 15 MG: 15 TABLET, FILM COATED ORAL at 22:34

## 2025-02-10 RX ADMIN — ATORVASTATIN CALCIUM 80 MG: 80 TABLET, FILM COATED ORAL at 22:33

## 2025-02-10 RX ADMIN — CLOPIDOGREL BISULFATE 300 MG: 75 TABLET ORAL at 15:12

## 2025-02-10 RX ADMIN — ASPIRIN 81 MG: 81 TABLET, CHEWABLE ORAL at 15:12

## 2025-02-10 RX ADMIN — SODIUM CHLORIDE, PRESERVATIVE FREE 5 ML: 5 INJECTION INTRAVENOUS at 22:34

## 2025-02-10 ASSESSMENT — LIFESTYLE VARIABLES
HOW MANY STANDARD DRINKS CONTAINING ALCOHOL DO YOU HAVE ON A TYPICAL DAY: PATIENT DOES NOT DRINK
HOW OFTEN DO YOU HAVE A DRINK CONTAINING ALCOHOL: NEVER

## 2025-02-10 NOTE — PROGRESS NOTES
4 Eyes Skin Assessment     NAME:  Javid Sharp  YOB: 1950  MEDICAL RECORD NUMBER:  [id number]    The patient is being assessed for  Admission    I agree that at least one RN has performed a thorough Head to Toe Skin Assessment on the patient. ALL assessment sites listed below have been assessed.      Areas assessed by both nurses:    Head, Face, Ears, Shoulders, Back, Chest, Arms, Elbows, Hands, Sacrum. Buttock, Coccyx, Ischium, and Legs. Feet and Heels        Does the Patient have a Wound? No noted wound(s)       Suhas Prevention initiated by RN: No  Wound Care Orders initiated by RN: No    Pressure Injury (Stage 3,4, Unstageable, DTI, NWPT, and Complex wounds) if present, place Wound referral order by RN under : No    New Ostomies, if present place, Ostomy referral order under : No     Nurse 1 eSignature: Electronically signed by Latisha Lipscomb RN on 2/10/25 at 6:28 PM EST    **SHARE this note so that the co-signing nurse can place an eSignature**    Nurse 2 eSignature: {Esignature:[id number]}

## 2025-02-10 NOTE — ED TRIAGE NOTES
Arrives via EMS from home. CC weakness since this am. Deficit to left side upper and lower extremities. Neg stroke en route.     .

## 2025-02-10 NOTE — ED NOTES
TRANSFER - OUT REPORT:    Verbal report given to Karan on Javid Sharp  being transferred to [facility] for routine progression of patient care       Report consisted of patient's Situation, Background, Assessment and   Recommendations(SBAR).     Information from the following report(s) Nurse Handoff Report was reviewed with the receiving nurse.    Edna Fall Assessment:    Presents to emergency department  because of falls (Syncope, seizure, or loss of consciousness): No  Age > 70: Yes  Altered Mental Status, Intoxication with alcohol or substance confusion (Disorientation, impaired judgment, poor safety awaremess, or inability to follow instructions): No  Impaired Mobility: Ambulates or transfers with assistive devices or assistance; Unable to ambulate or transer.: Yes             Lines:   Peripheral IV Left Antecubital (Active)   Site Assessment Clean, dry & intact 02/10/25 1427   Line Status Blood return noted;Flushed 02/10/25 1427   Phlebitis Assessment No symptoms 02/10/25 1427   Infiltration Assessment 0 02/10/25 1427        Opportunity for questions and clarification was provided.      Patient transported with:  Too Carrizales RN  02/10/25 [id number]

## 2025-02-10 NOTE — ED PROVIDER NOTES
Emergency Department Provider Note       PCP: Bjorn Birmingham MD   Age: 74 y.o.   Sex: male     DISPOSITION Decision To Admit 02/10/2025 02:39:44 PM   DISPOSITION CONDITION Undetermined            ICD-10-CM    1. Acute CVA (cerebrovascular accident) (HCC)  I63.9           Medical Decision Making     Patient presents with speech deficit and possibly worsening weakness from baseline on the right side.  Patient was having some trouble with movements and \"turning over in bed \".  Otherwise, difficult to know based off exam.  Neurologist feels he has an NIH of 8 and has a baseline of 4-6.  Patient is not a tPA candidate as he is outside the therapeutic window.  Also feels he is not a thrombectomy candidate.  No evidence of hemorrhage.  Patient will be admitted to the hospital for further workup and management.  MRI is pending along with CTA and other workup.  Plavix will be loaded.     1 or more chronic illnesses with a severe exacerbation or progression.  Drug therapy given requiring intensive monitoring for toxicity.  Discussion with external consultants.  Chronic medical problems impacting care include CVA, hypertension.  Shared medical decision making was utilized in creating the patients health plan today.    I independently ordered and reviewed each unique test.  I reviewed external records: ED visit note from a different ED.   I reviewed external records: previous lab results from outside ED.  I reviewed external records: previous imaging study including radiologist interpretation.     I interpreted the CT Scan CT scan the brain noncontrast shows no evidence of hemorrhage.  There is a remote infarct in the flor.  No acute infarct.  I have reviewed and agree with radiology report.  My Independent EKG Interpretation: sinus rhythm, no evidence of arrhythmia      ST Segments:Normal ST segments - NO STEMI   Rate: 72  The patient was admitted and I have discussed patient management with the admitting

## 2025-02-10 NOTE — H&P
Hospitalist History and Physical   Admit Date:  2/10/2025  1:18 PM   Name:  Javid Sharp   Age:  74 y.o.  Sex:  male  :  1950   MRN:  [id number]   Room:  [facility]    Presenting/Chief Complaint: Extremity Weakness     Reason(s) for Admission: Acute left-sided weakness [R53.1]  Acute CVA (cerebrovascular accident) ([facility]) [I63.9]     History of Present Illness:   Javid Sharp is a 74 y.o. male with medical history of prior CVA with residual right deficits, type 2 diabetes, hypertension, depression who presented with left-sided weakness and dysarthria.  Family at bedside able to provide more history.  Patient says the symptoms started around 4 AM this morning.  Felt like his left leg gave out on him and also it was hard to speak.  Patient with history of prior CVA with residual right weakness.  Family says his symptoms are significantly out of proportion of normal.  Also having a hard time speaking and slurring his words.  He denies tobacco, alcohol, and illicit substance use.  No recent changes in medications.  He does have a history of diabetes, but does not take insulin, only metformin.  No recent infectious symptoms including fever, vomiting, diarrhea.  Says he did fall, but did not have chest pain or shortness of breath prior to falling.  In the ER, CT head obtained that shows prior CVA, but no acute bleed.  Code stroke called and neurology evaluated.  Initial NIH score of 8 for right arm/right leg weakness and dysarthria.  MRI brain and CTA head/neck pending.  Patient was loaded with aspirin and Plavix.  Hospitalist service consulted for admission.    Assessment & Plan:     Acute right frontal CVA  History of CVA  - Symptoms certainly concerning for acute CVA versus TIA  - Neurology consulted.  CTA head/neck and MRI brain pending  - Check A1c and lipid profile  - Continue aspirin, Plavix, and atorvastatin  - PT/OT/SLP consulted  - Telemetry for 48 hours  - Neurochecks every 4 hours per  Basophils Absolute 0.07 0.00 - 0.20 K/UL    Immature Granulocytes Absolute 0.03 0.0 - 0.5 K/UL   Comprehensive Metabolic Panel    Collection Time: 02/10/25  1:43 PM   Result Value Ref Range    Sodium 141 136 - 145 mmol/L    Potassium 4.3 3.5 - 5.1 mmol/L    Chloride 102 98 - 107 mmol/L    CO2 26 20 - 29 mmol/L    Anion Gap 13 7 - 16 mmol/L    Glucose 315 (H) 70 - 99 mg/dL    BUN 16 8 - 23 MG/DL    Creatinine 1.73 (H) 0.80 - 1.30 MG/DL    Est, Glom Filt Rate 41 (L) >60 ml/min/1.73m2    Calcium 9.5 8.8 - 10.2 MG/DL    Total Bilirubin 0.4 0.0 - 1.2 MG/DL    ALT 43 8 - 55 U/L    AST 29 15 - 37 U/L    Alk Phosphatase 89 40 - 129 U/L    Total Protein 6.6 6.3 - 8.2 g/dL    Albumin 3.7 3.2 - 4.6 g/dL    Globulin 2.9 2.3 - 3.5 g/dL    Albumin/Globulin Ratio 1.3 1.0 - 1.9     Protime-INR    Collection Time: 02/10/25  1:43 PM   Result Value Ref Range    Protime 13.1 11.3 - 14.9 sec    INR 0.9     Hemoglobin A1C    Collection Time: 02/10/25  1:43 PM   Result Value Ref Range    Hemoglobin A1C 10.2 (H) 0 - 5.6 %    Estimated Avg Glucose 246 mg/dL   Lipid Panel    Collection Time: 02/10/25  1:43 PM   Result Value Ref Range    Cholesterol, Total 124 0 - 200 MG/DL    Triglycerides 167 (H) 0 - 150 MG/DL    HDL 31 (L) 40 - 60 MG/DL    LDL Cholesterol 60 0 - 100 MG/DL    VLDL Cholesterol Calculated 33 (H) 6 - 23 MG/DL    Chol/HDL Ratio 4.0 0.0 - 5.0     EKG 12 Lead    Collection Time: 02/10/25  1:55 PM   Result Value Ref Range    Ventricular Rate 72 BPM    Atrial Rate 71 BPM    P-R Interval 172 ms    QRS Duration 100 ms    Q-T Interval 411 ms    QTc Calculation (Bazett) 450 ms    P Axis 47 degrees    R Axis 67 degrees    T Axis 26 degrees    Diagnosis       Sinus rhythm    Confirmed by MD OH (), TAMARA ([id number]) on 2/10/2025 2:48:16 PM     POCT Glucose    Collection Time: 02/10/25  8:16 PM   Result Value Ref Range    POC Glucose 146 (H) 65 - 100 mg/dL    Performed by: Bucky        No results for input(s): \"COVID19\" in

## 2025-02-10 NOTE — PROGRESS NOTES
TRANSFER - IN REPORT:    Verbal report received from primary RN on Javid Sharp  being received from ED for routine progression of patient care      Report consisted of patient's Situation, Background, Assessment and   Recommendations(SBAR).     Information from the following report(s) Nurse Handoff Report was reviewed with the receiving nurse.    Opportunity for questions and clarification was provided.      Assessment completed upon patient's arrival to unit and care assumed.

## 2025-02-10 NOTE — CONSULTS
Consult    Patient: Javid Sharp MRN: [id number]     YOB: 1950  Age: 74 y.o.  Sex: male      Subjective:      Javid Sharp is a 74 y.o. male who is being seen for code S..  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  The patient presents to the emergency department with worsening dysarthria and difficulty walking. The patient was last known normal sometime last night.  His symptoms were present in the morning when he awoke    The patient presented to [facility] ED.  A code S was called at 1:38 PM. Neurology arrived to the bedside at 1:43 PM. Initial NIHSS was as below. A CT of the head was obtained and shows chronic abnormalities only.     Past Medical History:   Diagnosis Date    Diabetes (HCC)     Stroke (cerebrum) (HCC)     September 2017     Past Surgical History:   Procedure Laterality Date    COLONOSCOPY      2011, clear, 10 years      Family History   Problem Relation Age of Onset    Cancer Father     Stroke Mother      Social History     Tobacco Use    Smoking status: Never    Smokeless tobacco: Never   Substance Use Topics    Alcohol use: No      Current Facility-Administered Medications   Medication Dose Route Frequency Provider Last Rate Last Admin    iopamidol (ISOVUE-370) 76 % injection 60 mL  60 mL IntraVENous ONCE PRN Jaun Izaguirre,          Current Outpatient Medications   Medication Sig Dispense Refill    metFORMIN (GLUCOPHAGE) 500 MG tablet Take 2 tablets by mouth 2 times daily (with meals) 360 tablet 1    atorvastatin (LIPITOR) 80 MG tablet Take 1 tablet by mouth daily 90 tablet 1    citalopram (CELEXA) 20 MG tablet Take 1 tablet by mouth daily 90 tablet 1    lisinopril-hydroCHLOROthiazide (PRINZIDE;ZESTORETIC) 10-12.5 MG per tablet Take 1 tablet by mouth daily 90 tablet 1    mirtazapine (REMERON) 15 MG tablet Take 1 tablet by mouth nightly 90 tablet 1    SITagliptin (JANUVIA) 50 MG tablet Take 1 tablet by mouth  daily 90 tablet 1    vitamin D (ERGOCALCIFEROL) 1.25 MG (84790 UT) CAPS capsule Take 1 capsule by mouth once a week 12 capsule 1    OXYGEN by Other route Use as instructed. Use 3L at rest and sleep and 4L with exertion for goal oxygen saturation >90%      aspirin 81 MG chewable tablet Take 2 tablets by mouth 2 times daily      cyanocobalamin 1000 MCG/ML injection One injection once a week for four weeks, then one injection once a month for one year.          No Known Allergies    Review of Systems:  Not obtained due to emergent situation         Objective:     Vitals:    02/10/25 1320   BP: (!) 178/88   Pulse: 75   Resp: 16   Temp: 97.8 °F (36.6 °C)   TempSrc: Oral   SpO2: 95%        Physical Exam:  General - Well developed, well nourished, in no apparent distress. Pleasant and conversant.   HEENT - Normocephalic, atraumatic. Conjunctiva, tympanic membranes, and oropharynx are clear.   Neck - Supple without masses, no bruits   Cardiovascular - Regular rate and rhythm. Normal S1, S2 without murmurs, rubs, or gallops.  Lungs - Clear to auscultation.  Abdomen - Soft, nontender with normal bowel sounds.   Extremities - Peripheral pulses intact. No edema and no rashes.     Neurological examination -: Comprehension is intact.  Memory was not formally tested.  Speech is severely dysarthric.  Other pertinent positives include spastic hemiparesis on the right with a right-sided facial droop.  Left side of the body shows normal strength and reflexes.    NIHSS   NIHSS Score: 8  1a-Level of Consciousness 0  1b-What is Month/Age 0  1c-Open/Close Eyes&Hand 0  2 -Best Gaze 0  3 -Visual Fields 0  4 -Facial Palsy 0  5a-Motor-Left Arm 0  5b-Motor-Right Arm 3  6a-Motor-Left Leg 0  6b-Motor-Right Leg 3  7 -Limb Ataxia 0  8 -Sensory 0  9 -Best Language 0  10-Dysarthria 2  11-Extinction/Inattention 0    Lab Results   Component Value Date/Time    CHOL 97 11/26/2024 02:41 PM    HDL 31 11/26/2024 02:41 PM    LDL 40 11/26/2024 02:41 PM

## 2025-02-11 ENCOUNTER — APPOINTMENT (OUTPATIENT)
Dept: NON INVASIVE DIAGNOSTICS | Age: 75
DRG: 065 | End: 2025-02-11
Payer: MEDICARE

## 2025-02-11 PROBLEM — Z51.89 ENCOUNTER FOR OTHER SPECIFIED AFTERCARE: Status: ACTIVE | Noted: 2025-02-11

## 2025-02-11 PROBLEM — I10 ESSENTIAL HYPERTENSION: Chronic | Status: ACTIVE | Noted: 2017-09-05

## 2025-02-11 PROBLEM — E11.29 TYPE 2 DIABETES MELLITUS WITH KIDNEY COMPLICATION, WITHOUT LONG-TERM CURRENT USE OF INSULIN (HCC): Chronic | Status: ACTIVE | Noted: 2025-02-10

## 2025-02-11 PROBLEM — Z78.9 DECREASED ACTIVITIES OF DAILY LIVING (ADL): Status: ACTIVE | Noted: 2025-02-11

## 2025-02-11 PROBLEM — R26.9 GAIT ABNORMALITY: Status: ACTIVE | Noted: 2025-02-11

## 2025-02-11 PROBLEM — I69.30 HISTORY OF CVA WITH RESIDUAL DEFICIT: Chronic | Status: ACTIVE | Noted: 2024-07-17

## 2025-02-11 PROBLEM — I69.351 HEMIPLEGIA AND HEMIPARESIS FOLLOWING CEREBRAL INFARCTION AFFECTING RIGHT DOMINANT SIDE (HCC): Chronic | Status: ACTIVE | Noted: 2017-09-08

## 2025-02-11 PROBLEM — Z66 DNR (DO NOT RESUSCITATE): Chronic | Status: ACTIVE | Noted: 2025-02-10

## 2025-02-11 PROBLEM — E78.2 MIXED HYPERLIPIDEMIA: Chronic | Status: ACTIVE | Noted: 2017-09-06

## 2025-02-11 PROBLEM — N18.32 CKD STAGE 3B, GFR 30-44 ML/MIN (HCC): Chronic | Status: ACTIVE | Noted: 2022-06-29

## 2025-02-11 PROBLEM — F32.0 CURRENT MILD EPISODE OF MAJOR DEPRESSIVE DISORDER WITHOUT PRIOR EPISODE: Chronic | Status: ACTIVE | Noted: 2022-03-02

## 2025-02-11 LAB
ANION GAP SERPL CALC-SCNC: 11 MMOL/L (ref 7–16)
BUN SERPL-MCNC: 14 MG/DL (ref 8–23)
CALCIUM SERPL-MCNC: 8.9 MG/DL (ref 8.8–10.2)
CHLORIDE SERPL-SCNC: 102 MMOL/L (ref 98–107)
CO2 SERPL-SCNC: 28 MMOL/L (ref 20–29)
CREAT SERPL-MCNC: 1.63 MG/DL (ref 0.8–1.3)
ECHO AO ASC DIAM: 3.2 CM
ECHO AO ASCENDING AORTA INDEX: 1.63 CM/M2
ECHO AO ROOT DIAM: 2.8 CM
ECHO AO ROOT INDEX: 1.43 CM/M2
ECHO AV AREA PEAK VELOCITY: 3.5 CM2
ECHO AV AREA VTI: 3.9 CM2
ECHO AV AREA/BSA PEAK VELOCITY: 1.8 CM2/M2
ECHO AV AREA/BSA VTI: 2 CM2/M2
ECHO AV MEAN GRADIENT: 3 MMHG
ECHO AV MEAN VELOCITY: 0.8 M/S
ECHO AV PEAK GRADIENT: 5 MMHG
ECHO AV PEAK VELOCITY: 1.1 M/S
ECHO AV VELOCITY RATIO: 0.91
ECHO AV VTI: 22.5 CM
ECHO BSA: 2 M2
ECHO LA AREA 2C: 11 CM2
ECHO LA AREA 4C: 9.4 CM2
ECHO LA MAJOR AXIS: 3.4 CM
ECHO LA MINOR AXIS: 3.9 CM
ECHO LA VOL BP: 24 ML (ref 18–58)
ECHO LA VOL MOD A2C: 26 ML (ref 18–58)
ECHO LA VOL MOD A4C: 20 ML (ref 18–58)
ECHO LA VOL/BSA BIPLANE: 12 ML/M2 (ref 16–34)
ECHO LA VOLUME INDEX MOD A2C: 13 ML/M2 (ref 16–34)
ECHO LA VOLUME INDEX MOD A4C: 10 ML/M2 (ref 16–34)
ECHO LV E' LATERAL VELOCITY: 7.58 CM/S
ECHO LV E' SEPTAL VELOCITY: 6.19 CM/S
ECHO LV EF PHYSICIAN: 55 %
ECHO LV FRACTIONAL SHORTENING: 33 % (ref 28–44)
ECHO LV INTERNAL DIMENSION DIASTOLE INDEX: 2.04 CM/M2
ECHO LV INTERNAL DIMENSION DIASTOLIC: 4 CM (ref 4.2–5.9)
ECHO LV INTERNAL DIMENSION SYSTOLIC INDEX: 1.38 CM/M2
ECHO LV INTERNAL DIMENSION SYSTOLIC: 2.7 CM
ECHO LV IVSD: 1.1 CM (ref 0.6–1)
ECHO LV MASS 2D: 136.2 G (ref 88–224)
ECHO LV MASS INDEX 2D: 69.5 G/M2 (ref 49–115)
ECHO LV POSTERIOR WALL DIASTOLIC: 1 CM (ref 0.6–1)
ECHO LV RELATIVE WALL THICKNESS RATIO: 0.5
ECHO LVOT AREA: 3.8 CM2
ECHO LVOT AV VTI INDEX: 1.02
ECHO LVOT DIAM: 2.2 CM
ECHO LVOT MEAN GRADIENT: 2 MMHG
ECHO LVOT PEAK GRADIENT: 4 MMHG
ECHO LVOT PEAK VELOCITY: 1 M/S
ECHO LVOT STROKE VOLUME INDEX: 44.6 ML/M2
ECHO LVOT SV: 87.4 ML
ECHO LVOT VTI: 23 CM
ECHO MV A VELOCITY: 0.79 M/S
ECHO MV E DECELERATION TIME (DT): 275 MS
ECHO MV E VELOCITY: 0.51 M/S
ECHO MV E/A RATIO: 0.65
ECHO MV E/E' LATERAL: 6.73
ECHO MV E/E' RATIO (AVERAGED): 7.48
ECHO MV E/E' SEPTAL: 8.24
ECHO PV AREA CONTINUITY EQ VELOCITY: 2 CM2
ECHO PV MAX VELOCITY: 1 M/S
ECHO PV PEAK GRADIENT: 4 MMHG
ECHO QP:QS RATIO: 0.47 NO UNITS
ECHO RV TAPSE: 1.4 CM (ref 1.7–?)
ECHO RVOT AREA: 2.8 CM2
ECHO RVOT DIAMETER: 1.9 CM
ECHO RVOT MEAN GRADIENT: 1 MMHG
ECHO RVOT PEAK GRADIENT: 2 MMHG
ECHO RVOT PEAK VELOCITY: 0.7 M/S
ECHO RVOT STROKE VOLUME: 40.8 ML
ECHO RVOT VTI: 14.4 CM
ERYTHROCYTE [DISTWIDTH] IN BLOOD BY AUTOMATED COUNT: 13.5 % (ref 11.9–14.6)
GLUCOSE BLD STRIP.AUTO-MCNC: 119 MG/DL (ref 65–100)
GLUCOSE BLD STRIP.AUTO-MCNC: 185 MG/DL (ref 65–100)
GLUCOSE BLD STRIP.AUTO-MCNC: 227 MG/DL (ref 65–100)
GLUCOSE BLD STRIP.AUTO-MCNC: 282 MG/DL (ref 65–100)
GLUCOSE SERPL-MCNC: 166 MG/DL (ref 70–99)
HCT VFR BLD AUTO: 40.6 % (ref 41.1–50.3)
HGB BLD-MCNC: 13.8 G/DL (ref 13.6–17.2)
MCH RBC QN AUTO: 30.3 PG (ref 26.1–32.9)
MCHC RBC AUTO-ENTMCNC: 34 G/DL (ref 31.4–35)
MCV RBC AUTO: 89.2 FL (ref 82–102)
NRBC # BLD: 0 K/UL (ref 0–0.2)
PLATELET # BLD AUTO: 251 K/UL (ref 150–450)
PMV BLD AUTO: 9.3 FL (ref 9.4–12.3)
POTASSIUM SERPL-SCNC: 3.6 MMOL/L (ref 3.5–5.1)
RBC # BLD AUTO: 4.55 M/UL (ref 4.23–5.6)
SERVICE CMNT-IMP: ABNORMAL
SODIUM SERPL-SCNC: 140 MMOL/L (ref 136–145)
WBC # BLD AUTO: 8.8 K/UL (ref 4.3–11.1)

## 2025-02-11 PROCEDURE — 93306 TTE W/DOPPLER COMPLETE: CPT | Performed by: INTERNAL MEDICINE

## 2025-02-11 PROCEDURE — 2500000003 HC RX 250 WO HCPCS

## 2025-02-11 PROCEDURE — 1100000003 HC PRIVATE W/ TELEMETRY

## 2025-02-11 PROCEDURE — 80048 BASIC METABOLIC PNL TOTAL CA: CPT

## 2025-02-11 PROCEDURE — 6360000002 HC RX W HCPCS: Performed by: INTERNAL MEDICINE

## 2025-02-11 PROCEDURE — 97161 PT EVAL LOW COMPLEX 20 MIN: CPT

## 2025-02-11 PROCEDURE — 93306 TTE W/DOPPLER COMPLETE: CPT

## 2025-02-11 PROCEDURE — 97165 OT EVAL LOW COMPLEX 30 MIN: CPT

## 2025-02-11 PROCEDURE — 36415 COLL VENOUS BLD VENIPUNCTURE: CPT

## 2025-02-11 PROCEDURE — 92523 SPEECH SOUND LANG COMPREHEN: CPT

## 2025-02-11 PROCEDURE — 92610 EVALUATE SWALLOWING FUNCTION: CPT

## 2025-02-11 PROCEDURE — 99221 1ST HOSP IP/OBS SF/LOW 40: CPT | Performed by: STUDENT IN AN ORGANIZED HEALTH CARE EDUCATION/TRAINING PROGRAM

## 2025-02-11 PROCEDURE — 6370000000 HC RX 637 (ALT 250 FOR IP): Performed by: INTERNAL MEDICINE

## 2025-02-11 PROCEDURE — 82962 GLUCOSE BLOOD TEST: CPT

## 2025-02-11 PROCEDURE — 6370000000 HC RX 637 (ALT 250 FOR IP)

## 2025-02-11 PROCEDURE — 97530 THERAPEUTIC ACTIVITIES: CPT

## 2025-02-11 PROCEDURE — 97535 SELF CARE MNGMENT TRAINING: CPT

## 2025-02-11 PROCEDURE — 99233 SBSQ HOSP IP/OBS HIGH 50: CPT | Performed by: PHYSICAL THERAPIST

## 2025-02-11 PROCEDURE — 97112 NEUROMUSCULAR REEDUCATION: CPT

## 2025-02-11 PROCEDURE — 85027 COMPLETE CBC AUTOMATED: CPT

## 2025-02-11 RX ORDER — ENOXAPARIN SODIUM 100 MG/ML
40 INJECTION SUBCUTANEOUS DAILY
Status: DISCONTINUED | OUTPATIENT
Start: 2025-02-11 | End: 2025-02-12 | Stop reason: HOSPADM

## 2025-02-11 RX ORDER — LISINOPRIL AND HYDROCHLOROTHIAZIDE 10; 12.5 MG/1; MG/1
1 TABLET ORAL DAILY
Status: DISCONTINUED | OUTPATIENT
Start: 2025-02-11 | End: 2025-02-11 | Stop reason: SDUPTHER

## 2025-02-11 RX ORDER — HYDROCHLOROTHIAZIDE 25 MG/1
12.5 TABLET ORAL DAILY
Status: DISCONTINUED | OUTPATIENT
Start: 2025-02-11 | End: 2025-02-12 | Stop reason: HOSPADM

## 2025-02-11 RX ORDER — LISINOPRIL 5 MG/1
10 TABLET ORAL DAILY
Status: DISCONTINUED | OUTPATIENT
Start: 2025-02-11 | End: 2025-02-12 | Stop reason: HOSPADM

## 2025-02-11 RX ADMIN — ATORVASTATIN CALCIUM 80 MG: 80 TABLET, FILM COATED ORAL at 21:38

## 2025-02-11 RX ADMIN — INSULIN LISPRO 1 UNITS: 100 INJECTION, SOLUTION INTRAVENOUS; SUBCUTANEOUS at 11:20

## 2025-02-11 RX ADMIN — SODIUM CHLORIDE, PRESERVATIVE FREE 10 ML: 5 INJECTION INTRAVENOUS at 10:14

## 2025-02-11 RX ADMIN — CLOPIDOGREL BISULFATE 75 MG: 75 TABLET ORAL at 08:45

## 2025-02-11 RX ADMIN — INSULIN LISPRO 1 UNITS: 100 INJECTION, SOLUTION INTRAVENOUS; SUBCUTANEOUS at 08:45

## 2025-02-11 RX ADMIN — HYDROCHLOROTHIAZIDE 12.5 MG: 25 TABLET ORAL at 10:35

## 2025-02-11 RX ADMIN — LISINOPRIL 10 MG: 5 TABLET ORAL at 10:35

## 2025-02-11 RX ADMIN — ENOXAPARIN SODIUM 40 MG: 100 INJECTION SUBCUTANEOUS at 10:14

## 2025-02-11 RX ADMIN — ASPIRIN 81 MG: 81 TABLET, CHEWABLE ORAL at 08:45

## 2025-02-11 RX ADMIN — MIRTAZAPINE 15 MG: 15 TABLET, FILM COATED ORAL at 21:38

## 2025-02-11 RX ADMIN — INSULIN LISPRO 2 UNITS: 100 INJECTION, SOLUTION INTRAVENOUS; SUBCUTANEOUS at 16:42

## 2025-02-11 RX ADMIN — SODIUM CHLORIDE, PRESERVATIVE FREE 5 ML: 5 INJECTION INTRAVENOUS at 21:39

## 2025-02-11 RX ADMIN — CITALOPRAM HYDROBROMIDE 20 MG: 20 TABLET ORAL at 08:45

## 2025-02-11 NOTE — PROGRESS NOTES
Stroke Education provided to patient and relative(s) and the following topics were discussed    1. Patients personal risk factors for stroke are diabetes mellitus, hypertension, and smoking    2. Warning signs of Stroke:        * Sudden numbness or weakness of the face, arm or leg, especially on one side of          The body            * Sudden confusion, trouble speaking or understanding        * Sudden trouble seeing in one or both eyes        * Sudden trouble walking, dizziness, loss of balance or coordination        * Sudden severe headache with no known cause      3. Importance of activation Emergency Medical Services ( 9-1-1 ) immediately if experience any warning signs of stroke.    4. Be sure and schedule a follow-up appointment with your primary care doctor or any specialists as instructed.     5. You must take medicine every day to treat your risk factors for stroke.  Be sure to take your medicines exactly as your doctor tells you: no more, no less.  Know what your medicines are for , what they do.  Anti-thrombotics /anticoagulants can help prevent strokes.  You are taking the following medicine(s)  Plavix, atorvastatin, aspirin, labetalol,      6.  Smoking and second-hand smoke greatly increase your risk of stroke, cardiovascular disease and death. Smoking history never    7. Information provided was [facility] Stroke Education Binder    8. Documentation of teaching completed in Patient Education Activity and on Care Plan with teaching response noted?  yes

## 2025-02-11 NOTE — THERAPY EVALUATION
ACUTE OCCUPATIONAL THERAPY GOALS:   (Developed with and agreed upon by patient and/or caregiver.)  1. Patient will complete lower body bathing and dressing with STANDBY ASSIST and adaptive equipment as needed.     2. Patient will complete toilet transfers and toileting with CONTACT GUARD ASSIST.  3. Patient will complete self-grooming ADL tasks at standing level with CONTACT GUARD ASSIST.  4. Patient will tolerate 25 minutes of OT treatment with 1-2 rest breaks to increase activity tolerance for ADLs.   5. Patient will complete functional transfers with CONTACT GUARD ASSIST and adaptive equipment as needed.   6. Patient will tolerate 10 minutes BUE exercises to increase strength for safe, functional transfers.     Timeframe: 7 visits        OCCUPATIONAL THERAPY Initial Assessment, Daily Note, and AM       OT Visit Days: 1  Acknowledge Orders  Time  OT Charge Capture  Rehab Caseload Tracker      Javid Sharp is a 74 y.o. male   PRIMARY DIAGNOSIS: Ischemic stroke of frontal lobe (HCC)  Acute left-sided weakness [R53.1]  Acute CVA (cerebrovascular accident) (HCC) [I63.9]       Reason for Referral: Generalized Muscle Weakness (M62.81)  Other lack of cordination (R27.8)  History of falling (Z91.81)  Inpatient: Payor: MEDICARE / Plan: MEDICARE PART A AND B / Product Type: *No Product type* /     ASSESSMENT:     REHAB RECOMMENDATIONS:   Recommendation to date pending progress:  Setting:  Inpatient Rehab Facility     Equipment:   To Be Determined - patient has quad cane and tub transfer bench at home     ASSESSMENT:  Mr. Sharp is a 73 y/o M who presents to the hospital with left-sided weakness and dysarthria. Admitted for CVA workup. PMHx significant for previous brainstem stroke (2017) with residual right sided spastic hemiparesis.    Today, pt is received supine in bed with adult daughter, agreeable to participate in the session. Per patient and daughter report, he lives alone in a mobile home with 4-5 steps to

## 2025-02-11 NOTE — PROGRESS NOTES
Hospitalist Progress Note   Admit Date:  2/10/2025  1:18 PM   Name:  Javid Sharp   Age:  74 y.o.  Sex:  male  :  1950   MRN:  [id number]   Room:  [facility]/    Presenting/Chief Complaint: Extremity Weakness     Reason(s) for Admission: Acute left-sided weakness [R53.1]  Acute CVA (cerebrovascular accident) (HCC) [I63.9]     Hospital Course:   Javid Sharp is a 74 y.o. male with medical history of prior CVA with residual right deficits, type 2 diabetes, hypertension, depression who presented with left-sided weakness and dysarthria.  Patient with history of prior CVA with residual right weakness.  Family reported his symptoms are significantly out of proportion of normal.  having a hard time speaking and slurring his words.  He denies tobacco, alcohol, and illicit substance use.  No recent changes in medications.  He does have a history of diabetes, but does not take insulin, only metformin.  No recent infectious symptoms including fever, vomiting, diarrhea.  Says he did fall, but did not have chest pain or shortness of breath prior to falling.  In the ER, CT head obtained that shows prior CVA, but no acute bleed.  Code stroke called and neurology evaluated.  Initial NIH score of 8 for right arm/right leg weakness and dysarthria. Patient was loaded with aspirin and Plavix.  Hospitalist service consulted for admission.       Subjective & 24hr Events:   Pt says his speech has improved.  He reports taking metformin 500mg BID at home.  Says PCP recently asked him to increase to 1000mg but he didn't do it.  Reports blood pressure and DM controlled at home but A1c was >10.      Assessment & Plan:       Acute left-sided weakness    Ischemic stroke of right frontal lobe (HCC)  -cont ASA/plavix.  -check echo  -MRI confirmed acute stroke  -needs therapy evals and probably rehab.  Discussed with physiatry today and they can take him tomorrow.  -LDL at goal.  Cont lipitor      Type 2 diabetes mellitus with

## 2025-02-11 NOTE — CARE COORDINATION
Pt is a 73 yo male admitted due to a confirmed CVA.  Pt is insured with pharmacy benefits and is established with a PCP.  CM consult received and pt discussed in IDR.  PT/OT evals complete with the recommendation for IRF at OR.  CM met with pt/dtr/son to discuss dc needs and therapy recommendations.  They are in agreement with IRF at OR and requested referrals to both [facility] and [facility]. Pt went to [facility] in 2017 and had a good experience.  Physiatrist consult placed.  Once PT/OT eval notes are available, CM will fax the referral to [facility].  CM following.    1230:  Referral faxed to [facility] admissions.    1744:  Pt has been accepted for admission to [facility]. Pt/family accepted bed offer.  Pt can transfer to [facility] tomorrow if he is medically ready for dc.       02/11/25 1124   Service Assessment   Patient Orientation Alert and Oriented   Cognition Alert   History Provided By Patient;Child/Family;Medical Record   Primary Caregiver Self   Accompanied By/Relationship dtr; son   Support Systems Spouse/Significant Other;Children;Family Members   Patient's Healthcare Decision Maker is: Legal Next of Kin   PCP Verified by CM Yes   Last Visit to PCP Within last 3 months  (11/26/2025)   Prior Functional Level Assistance with the following:;Mobility   Current Functional Level Assistance with the following:;Bathing;Dressing;Mobility   Can patient return to prior living arrangement No   Ability to make needs known: Good   Family able to assist with home care needs: Yes   Would you like for me to discuss the discharge plan with any other family members/significant others, and if so, who? Yes  (spouse; dtr)   Financial Resources Medicare   Social/Functional History   Lives With Spouse   Prior Level of Assist for ADLs Independent   Prior Level of Assist for Homemaking Independent   Ambulation Assistance Needs assistance  (uses cane)   Prior Level of Assist for Transfers Independent   Occupation Retired   Discharge Planning   Type of Residence

## 2025-02-11 NOTE — PRE-CERTIFICATION NOTE
[facility]  Pre-admission Assessment    Facility Information: [facility]  Patient Name: Javid Kennedy        MRN: [id number]    : 1950 (74 y.o.)  Gender: male   Ethnicity:Not of , /a, or Maori origin  Race:White    COVERAGE INFORMATION  Active Insurance as of 2/10/2025       Primary Coverage       Payor Plan Insurance Group Employer/Plan Group    MEDICARE MEDICARE PART A AND B        Payor Address Payor Phone Number Payor Fax Number Effective Dates    [address] [address] [phone]  2016 - None Entered    [facility] [id number]         Subscriber Name Subscriber Birth Date Member ID       JAVID KENNEDY 1950 [id number]               Secondary Coverage       Payor Plan Insurance Group Employer/Plan Group    UNITED [employer] AARP HEALTH CARE MEDICARE SUPP        Payor Plan Address Payor Plan Phone Number Payor Plan Fax Number Effective Dates    [address] [phone]  2022 - None Entered    [facility] [id number]         Subscriber Name Subscriber Birth Date Member ID       JAVID KENNEDY 1950 [id number]                   Update Due:     PHYSICIAN/REFERRAL INFORMATION  Attending Physician: Wilman Brown MD   Admitted From: [facility]  Date of Admission to the Hospital: 2/10/2025  1:18 PM  Date Patient Eligible for Admission: 2025    PRIOR LIVING SITUATION/LEVEL OF FUNCTION:  Social/Functional History  Lives With: Alone  Type of Home: Mobile home  Home Layout: One level  Home Access: Stairs to enter with rails  Entrance Stairs - Number of Steps: 4-5  Bathroom Shower/Tub: Tub/Shower unit  Bathroom Equipment: Tub transfer bench  Home Equipment: Cane - Quad  Has the patient had two or more falls in the past year or any fall with injury in the past year?: Yes  Receives Help From: Family  Prior Level of Assist for ADLs: Independent  Prior Level of Assist for Homemaking: Independent  Homemaking  PLAN:  Expected Level of Improvement For Safe Discharge: Mod I with ADLs and Ambulation  Required Therapy:   Physical Therapy: > 90 minutes per day, > 5 days per week for duration of rehab stay  Occupational Therapy: > 90 minutes per day, > 5 days per week for duration of rehab stay  Speech Therapy: Per doctor's order   Anticipated Duration of Inpatient Rehab Stay: 2 weeks    Expected Discharge Destination: Home with Assistance  Expected Services Upon Discharge: Home Health: PT and OT    Acute Inpatient Rehabilitation Disclosure Statement provided to patient. Patient verbalized understanding. yes    I have reviewed and concur with the findings and results of the pre-admission screening assessment completed by the Inpatient Rehabilitation Admissions Coordinator.

## 2025-02-11 NOTE — PROGRESS NOTES
Neurology Daily Progress Note     Assessment:     74-year-old man seen as a code S with worsening of his baseline neurological symptoms including difficulty with ambulation and dysarthria.   Initial NIHSS was 8.  His baseline is probably between a 4 and a 6. CT of the head was obtained and shows chronic abnormalities only. The patient was not a candidate for tenecteplase because he is outside the therapeutic window . The patient was not a candidate for mechanical thrombectomy because of high MRS.    Has chronic right sided spasticity from prior stroke, seems that it is only lingering symptom from most recent stroke is decreased left  strength.  Seems to be improving well.    After renal function was corrected, CTA was obtained showing less than 50% stenosis in bilateral carotid arteries.  There is some suspicion for cardioembolic causes (echo pending today), but feel this may be due to traditional vascular risk factors including uncontrolled diabetes (last A1c yesterday was 10.2).  This needs to be corrected her stroke this will continue to be elevated.     Plan:     -Echocardiogram today, recommend heart monitor at discharge  -Continue aspirin indefinitely  -Continue Plavix for 21 days   -Continue high intensity statin indefinitely, goal LDL of less than 70  -Goal BP <140/80, Goal A1C <7.0  -Can follow-up with me in the outpatient clinic, we will place this order.  -Tobacco cessation if applicable  -Continue PT/OT/ST  -No further recommendations, neurology will sign off.    Subjective:        Interval history:    Supine in bed on presentation.  Reports he is feeling much better since his admission.  Still has some left upper extremity weakness, but aside from that he is back to his baseline of chronic right upper and lower extremity weakness/spasticity.  Of note he believes his taste is actually improved following the stroke which was impaired on his initial stroke several years ago.    Initial    Weight:    86.2 kg (190 lb)   Height:    1.676 m (5' 6\")          Current Facility-Administered Medications:     enoxaparin (LOVENOX) injection 40 mg, 40 mg, SubCUTAneous, Daily, Wilman Brown MD    lisinopril (PRINIVIL;ZESTRIL) tablet 10 mg, 10 mg, Oral, Daily **AND** hydroCHLOROthiazide (HYDRODIURIL) tablet 12.5 mg, 12.5 mg, Oral, Daily, Wilman Brown MD    atorvastatin (LIPITOR) tablet 80 mg, 80 mg, Oral, Nightly, Saran Gould MD, 80 mg at 02/10/25 2233    citalopram (CELEXA) tablet 20 mg, 20 mg, Oral, Daily, Saran Gould MD, 20 mg at 02/11/25 0845    mirtazapine (REMERON) tablet 15 mg, 15 mg, Oral, Nightly, Saran Gould MD, 15 mg at 02/10/25 2234    sodium chloride flush 0.9 % injection 5-40 mL, 5-40 mL, IntraVENous, 2 times per day, Saran Gould MD, 5 mL at 02/10/25 2234    sodium chloride flush 0.9 % injection 5-40 mL, 5-40 mL, IntraVENous, PRN, Saran Gould MD    0.9 % sodium chloride infusion, , IntraVENous, PRN, Saran Gould MD    acetaminophen (TYLENOL) tablet 650 mg, 650 mg, Oral, Q4H PRN **OR** acetaminophen (TYLENOL) suppository 650 mg, 650 mg, Rectal, Q4H PRN, Saran Gould MD    ondansetron (ZOFRAN-ODT) disintegrating tablet 4 mg, 4 mg, Oral, Q8H PRN **OR** ondansetron (ZOFRAN) injection 4 mg, 4 mg, IntraVENous, Q6H PRN, Saran Gould MD    polyethylene glycol (GLYCOLAX) packet 17 g, 17 g, Oral, Daily PRN, Saran Gould MD    bisacodyl (DULCOLAX) suppository 10 mg, 10 mg, Rectal, Daily PRN, Saran Gould MD    clopidogrel (PLAVIX) tablet 75 mg, 75 mg, Oral, Daily, Saran Gould MD, 75 mg at 02/11/25 0845    labetalol (NORMODYNE;TRANDATE) injection 10 mg, 10 mg, IntraVENous, Q1H PRN, Saran Gould MD    aspirin chewable tablet 81 mg, 81 mg, Oral, Daily, Saran Gould MD, 81 mg at 02/11/25 0845    glucose chewable tablet 16 g, 4 tablet, Oral, PRN, Saran Gould MD    dextrose bolus 10% 125 mL, 125 mL,

## 2025-02-11 NOTE — DIABETES MGMT
Patient seen for assessment regarding diabetes management by diabetes educator. Patient daughter, son, and grand-daughter at bedside.  A1c 10.2 (eAG 246). Patient has a past medical history of CKD, HLD, HTN, Hemiplegia and hemiparesis following cerebral infarction affecting right dominant side, DM type 2, neuropathy. Patient states they have been living with diabetes since 2017. Patient states they do have a working glucometer with supplies at home. Per patient they typically check blood glucose levels daily in the evening. Patient states they are currently taking Metformin 500 mg BID at home for management of diabetes. Per patient was supposed to start taking Metformin 1000 mg BID, but did not change this after last PCP visit. Educated regarding hypoglycemia signs, symptoms, and treatment. Patient reports no difficulty with affording their diabetic supplies. Patient states PCP is Bjorn Birmingham.    Patient given educational material, \"Diabetes Self-Management: A Patient Teaching Guide\", which was reviewed with patient. Explained basic physiology of diabetes, as well as causes, signs and symptoms, and treatments for hypoglycemia and hyperglycemia. Described the effects of poor glycemic control and the development of long-term complications such as renal, eye, nerve, and cardiovascular disease. Per patient they typically drink water, green tea with teaspoon of honey, Gatorade Zero. Reviewed effects of sweetened beverages on glycemic control and discussed alternative beverages to help improve glycemic control. Per patient they typically eat a Little Jeana and a banana for breakfast, snack on apples and peanut butter wafers. Educated re: effects of carbohydrates on blood glucose, the \"plate method\" of healthy meal planning, basics of healthy meal plan, Consistent Carbohydrate Diet. Also explained the relationship between hyperglycemia and infection and delayed healing. Discussed target goals for blood glucose and A1C.

## 2025-02-11 NOTE — PROGRESS NOTES
GOALS:  LTG: Patient will increase receptive/expressive language skills demonstrated by the ability to communicate basic wants/needs across environments.   STG:  Patient will describe given picture with 5 specific details with 100% accuracy given minimal cueing.    LTG: Patient will develop functional and intelligible speech and utilize compensatory strategies to improve communication across environments.  STG:  Patient will repeat words, phrases, sentences with 90% intelligibility given no cueing.  Patient will utilize compensatory strategies across tasks with 90% intelligibility given minimal cueing.    SPEECH LANGUAGE PATHOLOGY: COMBINED Dysphagia and Cognitive Communication Initial Assessment    Acknowledge Order  I  Therapy Time  I   Charges     I  Rehab Caseload Tracker    NAME: Javid Sharp  : 1950  MRN: [id number]    ADMISSION DATE: 2/10/2025  PRIMARY DIAGNOSIS: Ischemic stroke of frontal lobe (HCC)    ICD-10: Treatment Diagnosis:   R13.11 Dysphagia, Oral Phase  R41.841 Cognitive-Communication Deficit  R47.1 Dysarthria and Anarthria  F80.1 Expressive Language Disorder    RECOMMENDATIONS   Diet:    Regular Consistency  Thin Liquids    Medication: as tolerated   Compensatory Swallowing Strategies:   Slow rate of intake  Small bites/sips  Upright as possible for all oral intake   Therapeutic Intervention:   Patient/family education  Dysphagia treatment  Language treatment  Speech treatment  Cognitive-linguistic treatment   Patient continues to require skilled intervention:  Yes. Recommend ongoing speech therapy services during this hospitalization.     Anticipated Discharge Needs: Do not anticipate ongoing speech therapy needs upon discharge.      ASSESSMENT    Dysphagia: Patient presents with functional oropharyngeal swallow as able to be determined at bedside characterized by efficient oral prep and transfer, timely swallow initiation, and no overt indications of airway compromise when consuming

## 2025-02-11 NOTE — PROGRESS NOTES
ACUTE PHYSICAL THERAPY GOALS:   (Developed with and agreed upon by patient and/or caregiver.)  Pt will perform bed mobility with Franklin in 7 therapy sessions.  Pt will perform sit-to-stand/ stand-to-sit transfers SBA in 7 therapy sessions.  Pt will ambulate 150 ft CGA with use of LRAD/no device and breaks as needed in 7 therapy sessions.  Pt will tolerate 8 minutes of standing activity with LRAD/no device in 7 therapy sessions.  Pt will negotiate up and down 3 steps CGA with use of a handrail in 7 therapy sessions.  Pt will perform standing dynamic balance activities with minimal postural sway in 7 therapy sessions.  Pt will tolerate multiple sets and reps of BLE exercises in 7 therapy sessions.   Pt will verbalize 3x fall prevention strategies to implement when returning home within 7 therapy sessions.      PHYSICAL THERAPY Initial Assessment and AM  (Link to Caseload Tracking: PT Visit Days : 1  Acknowledge Orders  Time In/Out  PT Charge Capture  Rehab Caseload Tracker    Javid Sharp is a 74 y.o. male   PRIMARY DIAGNOSIS: Ischemic stroke of frontal lobe (HCC)  Acute left-sided weakness [R53.1]  Acute CVA (cerebrovascular accident) (HCC) [I63.9]       Reason for Referral: Other abnormalities of gait and mobility (R26.89)  Inpatient: Payor: MEDICARE / Plan: MEDICARE PART A AND B / Product Type: *No Product type* /     ASSESSMENT:     REHAB RECOMMENDATIONS:   Recommendation to date pending progress:  Setting:  Inpatient Rehab Facility    Equipment:    To Be Determined - owns and utilizes a quad cane     ASSESSMENT:  Mr. Sharp is a 74 y.o. male presenting to PT following a hospitalization due to speech deficits and increased weakness, currently undergoing a stroke workup. Of note, pt experienced a brainstem CVA back in 2017, resulting in R sided deficits/spastic hemiplegia. At baseline since this, pt has progressed to ambulating  with a SPC and lives alone in a single story home with 1 BAKARI. At time of initial  Ability  Decreased Strength  Decreased Transfer Abilities INTERVENTIONS PLANNED:   (Benefits and precautions of physical therapy have been discussed with the patient.)  Self Care Training  Therapeutic Activity  Therapeutic Exercise/HEP  Neuromuscular Re-education  Gait Training  Education         TREATMENT:   EVALUATION: LOW COMPLEXITY: (Untimed Charge)  The initial evaluation charge encompasses clinical chart review, objective assessment, interpretation of assessment, and skilled monitoring of the patient's response to treatment in order to develop a plan of care.     TREATMENT:   Co-Treatment PT/OT necessary due to patient's decreased overall endurance/tolerance levels, as well as need for high level skilled assistance to complete functional transfers/mobility and functional tasks  Therapeutic Activity (23 Minutes): Therapeutic activity included Supine to Sit, Scooting, Transfer Training, Ambulation on level ground, Sitting balance , and Standing balance to improve functional Activity tolerance, Balance, Coordination, Mobility, Strength, and ROM.    TREATMENT GRID:  N/A    AFTER TREATMENT PRECAUTIONS: Bed/Chair Locked, Call light within reach, Chair, Needs within reach, Visitors at bedside, and with OT present    INTERDISCIPLINARY COLLABORATION:  MD/ PA/ NP , RN/ PCT, PT/ PTA, OT/ PENN, and RN Case Manager/      EDUCATION:    Educated patient and/or family/caregiver on the following: Role of PT    TIME IN/OUT:  Time In: 0934  Time Out: 0958  Minutes: 24    Stephen Dalal PT

## 2025-02-11 NOTE — PLAN OF CARE
Problem: Chronic Conditions and Co-morbidities  Goal: Patient's chronic conditions and co-morbidity symptoms are monitored and maintained or improved  Outcome: Progressing  Flowsheets (Taken 2/10/2025 1948)  Care Plan - Patient's Chronic Conditions and Co-Morbidity Symptoms are Monitored and Maintained or Improved: Monitor and assess patient's chronic conditions and comorbid symptoms for stability, deterioration, or improvement     Problem: Discharge Planning  Goal: Discharge to home or other facility with appropriate resources  Outcome: Progressing  Flowsheets (Taken 2/10/2025 1948)  Discharge to home or other facility with appropriate resources:   Identify barriers to discharge with patient and caregiver   Arrange for needed discharge resources and transportation as appropriate   Identify discharge learning needs (meds, wound care, etc)   Refer to discharge planning if patient needs post-hospital services based on physician order or complex needs related to functional status, cognitive ability or social support system

## 2025-02-11 NOTE — CONSULTS
[facility]  Inpatient Rehab Consult        Admission Date: 2/10/2025  Primary Care Provider: Bjorn Birmingham MD  Specialty Group / Referring Service: Medicine      Chief Complaint : New CVA  Admitting Diagnosis:   Acute left-sided weakness [R53.1]  Acute CVA (cerebrovascular accident) (HCC) [I63.9]    Principal Problem:    Ischemic stroke of right frontal lobe ([facility])  Active Problems:    CKD stage 3b, GFR 30-44 ml/min ([facility])    Mixed hyperlipidemia    Essential hypertension    Dysarthria    Hemiplegia and hemiparesis following cerebral infarction affecting right dominant side ([facility])    History of CVA with residual deficit    Acute left-sided weakness    Type 2 diabetes mellitus with kidney complication, without long-term current use of insulin ([facility])    DNR (do not resuscitate)  Resolved Problems:    * No resolved hospital problems. *      Acute Rehab Diagnoses:  Encounter for rehabilitation [Z51.89]   Abnormality of gait and mobility [R26.9]  Decreased independence for activities of daily living (ADL) [Z78.9]  Physical debility / deconditioning [R53.81]      Medical Dx:  Past Medical History:   Diagnosis Date    Diabetes ([facility])     Stroke (cerebrum) ([facility])     September 2017       Date of Evaluation: February 11, 2025    Subjective       HPI: Javid Sharp is a 74 y.o. male patient who presented to [facility] on 2/10/2025 secondary to left-sided weakness.  Per H&P \"Javid Sharp is a 74 y.o. male with medical history of prior CVA with residual right deficits, type 2 diabetes, hypertension, depression who presented with left-sided weakness and dysarthria.  Patient with history of prior CVA with residual right weakness.  Family reported his symptoms are significantly out of proportion of normal.  having a hard time speaking and slurring his words.  He denies tobacco, alcohol, and illicit substance use.  No recent changes in medications.  He does have a history of  ms    QRS Duration 100 ms    Q-T Interval 411 ms    QTc Calculation (Bazett) 450 ms    P Axis 47 degrees    R Axis 67 degrees    T Axis 26 degrees    Diagnosis       Sinus rhythm    Confirmed by MD OH (), TAMARA ([id number]) on 2/10/2025 2:48:16 PM     POCT Glucose    Collection Time: 02/10/25  8:16 PM   Result Value Ref Range    POC Glucose 146 (H) 65 - 100 mg/dL    Performed by: Bucky    Basic Metabolic Panel w/ Reflex to MG    Collection Time: 02/11/25  4:49 AM   Result Value Ref Range    Sodium 140 136 - 145 mmol/L    Potassium 3.6 3.5 - 5.1 mmol/L    Chloride 102 98 - 107 mmol/L    CO2 28 20 - 29 mmol/L    Anion Gap 11 7 - 16 mmol/L    Glucose 166 (H) 70 - 99 mg/dL    BUN 14 8 - 23 MG/DL    Creatinine 1.63 (H) 0.80 - 1.30 MG/DL    Est, Glom Filt Rate 44 (L) >60 ml/min/1.73m2    Calcium 8.9 8.8 - 10.2 MG/DL   CBC    Collection Time: 02/11/25  4:49 AM   Result Value Ref Range    WBC 8.8 4.3 - 11.1 K/uL    RBC 4.55 4.23 - 5.6 M/uL    Hemoglobin 13.8 13.6 - 17.2 g/dL    Hematocrit 40.6 (L) 41.1 - 50.3 %    MCV 89.2 82 - 102 FL    MCH 30.3 26.1 - 32.9 PG    MCHC 34.0 31.4 - 35.0 g/dL    RDW 13.5 11.9 - 14.6 %    Platelets 251 150 - 450 K/uL    MPV 9.3 (L) 9.4 - 12.3 FL    nRBC 0.00 0.0 - 0.2 K/uL   POCT Glucose    Collection Time: 02/11/25  6:50 AM   Result Value Ref Range    POC Glucose 185 (H) 65 - 100 mg/dL    Performed by: Kennedi    POCT Glucose    Collection Time: 02/11/25 10:53 AM   Result Value Ref Range    POC Glucose 227 (H) 65 - 100 mg/dL    Performed by: Fredi        Imaging:   CT Result (most recent):  CTA HEAD NECK W CONTRAST 02/10/2025    Narrative  EXAMINATION: CTA HEAD NECK W CONTRAST 2/10/2025 3:29 PM    ACCESSION NUMBER: [id number]    COMPARISON: None available    INDICATION: Stroke    TECHNIQUE: Dedicated contrast enhanced CT of the arteries of the head and neck  was performed with axial images following a timed vascular bolus of 100 mL of  Isovue-370.  Coronal and

## 2025-02-11 NOTE — PROGRESS NOTES
Received phone call from Radiologist for patient in room 709. Alerted on call provider of what the MRI resulted. On call provider stated that there was no new orders for patient at this time. Pt family requested a provider come and speak to them regarding said result. Admitting doctor Luis Fernando spoke to family. All questions answered at this time.

## 2025-02-12 ENCOUNTER — HOSPITAL ENCOUNTER (INPATIENT)
Age: 75
LOS: 13 days | Discharge: HOME | DRG: 057 | End: 2025-02-25
Attending: STUDENT IN AN ORGANIZED HEALTH CARE EDUCATION/TRAINING PROGRAM | Admitting: STUDENT IN AN ORGANIZED HEALTH CARE EDUCATION/TRAINING PROGRAM
Payer: MEDICARE

## 2025-02-12 VITALS
HEIGHT: 66 IN | WEIGHT: 190 LBS | BODY MASS INDEX: 30.53 KG/M2 | HEART RATE: 77 BPM | RESPIRATION RATE: 17 BRPM | DIASTOLIC BLOOD PRESSURE: 80 MMHG | OXYGEN SATURATION: 93 % | TEMPERATURE: 97.5 F | SYSTOLIC BLOOD PRESSURE: 148 MMHG

## 2025-02-12 DIAGNOSIS — F32.0 CURRENT MILD EPISODE OF MAJOR DEPRESSIVE DISORDER WITHOUT PRIOR EPISODE: ICD-10-CM

## 2025-02-12 DIAGNOSIS — I63.9 ACUTE CVA (CEREBROVASCULAR ACCIDENT) (HCC): Primary | ICD-10-CM

## 2025-02-12 PROBLEM — Z51.89 ENCOUNTER FOR OTHER SPECIFIED AFTERCARE: Status: RESOLVED | Noted: 2025-02-11 | Resolved: 2025-02-12

## 2025-02-12 LAB
GLUCOSE BLD STRIP.AUTO-MCNC: 144 MG/DL (ref 65–100)
GLUCOSE BLD STRIP.AUTO-MCNC: 168 MG/DL (ref 65–100)
GLUCOSE BLD STRIP.AUTO-MCNC: 182 MG/DL (ref 65–100)
GLUCOSE BLD STRIP.AUTO-MCNC: 199 MG/DL (ref 65–100)
SERVICE CMNT-IMP: ABNORMAL

## 2025-02-12 PROCEDURE — 97550 CAREGIVER TRAING 1ST 30 MIN: CPT

## 2025-02-12 PROCEDURE — 6360000002 HC RX W HCPCS: Performed by: INTERNAL MEDICINE

## 2025-02-12 PROCEDURE — 1180000000 HC REHAB R&B

## 2025-02-12 PROCEDURE — 99222 1ST HOSP IP/OBS MODERATE 55: CPT | Performed by: STUDENT IN AN ORGANIZED HEALTH CARE EDUCATION/TRAINING PROGRAM

## 2025-02-12 PROCEDURE — 2500000003 HC RX 250 WO HCPCS: Performed by: STUDENT IN AN ORGANIZED HEALTH CARE EDUCATION/TRAINING PROGRAM

## 2025-02-12 PROCEDURE — 2500000003 HC RX 250 WO HCPCS

## 2025-02-12 PROCEDURE — 97535 SELF CARE MNGMENT TRAINING: CPT

## 2025-02-12 PROCEDURE — 97167 OT EVAL HIGH COMPLEX 60 MIN: CPT

## 2025-02-12 PROCEDURE — 6370000000 HC RX 637 (ALT 250 FOR IP): Performed by: INTERNAL MEDICINE

## 2025-02-12 PROCEDURE — 82962 GLUCOSE BLOOD TEST: CPT

## 2025-02-12 PROCEDURE — 6370000000 HC RX 637 (ALT 250 FOR IP): Performed by: STUDENT IN AN ORGANIZED HEALTH CARE EDUCATION/TRAINING PROGRAM

## 2025-02-12 PROCEDURE — 6370000000 HC RX 637 (ALT 250 FOR IP)

## 2025-02-12 RX ORDER — ONDANSETRON 4 MG/1
4 TABLET, ORALLY DISINTEGRATING ORAL EVERY 8 HOURS PRN
Status: CANCELLED | OUTPATIENT
Start: 2025-02-12

## 2025-02-12 RX ORDER — ASPIRIN 81 MG/1
81 TABLET, CHEWABLE ORAL DAILY
Status: CANCELLED | OUTPATIENT
Start: 2025-02-13

## 2025-02-12 RX ORDER — BISACODYL 10 MG
10 SUPPOSITORY, RECTAL RECTAL DAILY PRN
Status: DISCONTINUED | OUTPATIENT
Start: 2025-02-12 | End: 2025-02-25 | Stop reason: HOSPADM

## 2025-02-12 RX ORDER — LISINOPRIL 5 MG/1
10 TABLET ORAL DAILY
Status: DISCONTINUED | OUTPATIENT
Start: 2025-02-13 | End: 2025-02-25 | Stop reason: HOSPADM

## 2025-02-12 RX ORDER — SODIUM CHLORIDE 0.9 % (FLUSH) 0.9 %
5-40 SYRINGE (ML) INJECTION PRN
Status: CANCELLED | OUTPATIENT
Start: 2025-02-12

## 2025-02-12 RX ORDER — GLIPIZIDE 5 MG/1
5 TABLET ORAL 2 TIMES DAILY
Qty: 60 TABLET | Refills: 3 | Status: ON HOLD | OUTPATIENT
Start: 2025-02-12 | End: 2025-02-24 | Stop reason: HOSPADM

## 2025-02-12 RX ORDER — GLIPIZIDE 5 MG/1
5 TABLET ORAL
Status: DISCONTINUED | OUTPATIENT
Start: 2025-02-13 | End: 2025-02-17

## 2025-02-12 RX ORDER — IBUPROFEN 600 MG/1
1 TABLET ORAL PRN
Status: CANCELLED | OUTPATIENT
Start: 2025-02-12

## 2025-02-12 RX ORDER — HYDRALAZINE HYDROCHLORIDE 10 MG/1
10 TABLET, FILM COATED ORAL 3 TIMES DAILY PRN
Status: CANCELLED | OUTPATIENT
Start: 2025-02-12

## 2025-02-12 RX ORDER — CARBOXYMETHYLCELLULOSE SODIUM 10 MG/ML
1 GEL OPHTHALMIC 3 TIMES DAILY PRN
Status: CANCELLED | OUTPATIENT
Start: 2025-02-12

## 2025-02-12 RX ORDER — ATORVASTATIN CALCIUM 80 MG/1
80 TABLET, FILM COATED ORAL NIGHTLY
Status: DISCONTINUED | OUTPATIENT
Start: 2025-02-12 | End: 2025-02-25 | Stop reason: HOSPADM

## 2025-02-12 RX ORDER — LISINOPRIL 5 MG/1
10 TABLET ORAL DAILY
Status: CANCELLED | OUTPATIENT
Start: 2025-02-13

## 2025-02-12 RX ORDER — ENOXAPARIN SODIUM 100 MG/ML
40 INJECTION SUBCUTANEOUS DAILY
Status: CANCELLED | OUTPATIENT
Start: 2025-02-13

## 2025-02-12 RX ORDER — SODIUM PHOSPHATE, DIBASIC AND SODIUM PHOSPHATE, MONOBASIC 7; 19 G/230ML; G/230ML
1 ENEMA RECTAL DAILY PRN
Status: CANCELLED | OUTPATIENT
Start: 2025-02-12

## 2025-02-12 RX ORDER — GLIPIZIDE 5 MG/1
5 TABLET ORAL
Status: CANCELLED | OUTPATIENT
Start: 2025-02-13

## 2025-02-12 RX ORDER — CARBOXYMETHYLCELLULOSE SODIUM 10 MG/ML
1 GEL OPHTHALMIC 3 TIMES DAILY PRN
Status: DISCONTINUED | OUTPATIENT
Start: 2025-02-12 | End: 2025-02-25 | Stop reason: HOSPADM

## 2025-02-12 RX ORDER — ENOXAPARIN SODIUM 100 MG/ML
40 INJECTION SUBCUTANEOUS DAILY
Status: DISCONTINUED | OUTPATIENT
Start: 2025-02-13 | End: 2025-02-25 | Stop reason: HOSPADM

## 2025-02-12 RX ORDER — HYDROCHLOROTHIAZIDE 25 MG/1
12.5 TABLET ORAL DAILY
Status: DISCONTINUED | OUTPATIENT
Start: 2025-02-13 | End: 2025-02-25 | Stop reason: HOSPADM

## 2025-02-12 RX ORDER — SODIUM CHLORIDE 0.9 % (FLUSH) 0.9 %
5-40 SYRINGE (ML) INJECTION EVERY 12 HOURS SCHEDULED
Status: CANCELLED | OUTPATIENT
Start: 2025-02-12

## 2025-02-12 RX ORDER — IBUPROFEN 600 MG/1
1 TABLET ORAL PRN
Status: DISCONTINUED | OUTPATIENT
Start: 2025-02-12 | End: 2025-02-25 | Stop reason: HOSPADM

## 2025-02-12 RX ORDER — INSULIN LISPRO 100 [IU]/ML
0-4 INJECTION, SOLUTION INTRAVENOUS; SUBCUTANEOUS
Status: CANCELLED | OUTPATIENT
Start: 2025-02-12

## 2025-02-12 RX ORDER — ONDANSETRON 4 MG/1
4 TABLET, ORALLY DISINTEGRATING ORAL EVERY 8 HOURS PRN
Status: DISCONTINUED | OUTPATIENT
Start: 2025-02-12 | End: 2025-02-25 | Stop reason: HOSPADM

## 2025-02-12 RX ORDER — SENNA AND DOCUSATE SODIUM 50; 8.6 MG/1; MG/1
1 TABLET, FILM COATED ORAL
Status: DISCONTINUED | OUTPATIENT
Start: 2025-02-12 | End: 2025-02-18

## 2025-02-12 RX ORDER — CITALOPRAM HYDROBROMIDE 20 MG/1
20 TABLET ORAL DAILY
Status: CANCELLED | OUTPATIENT
Start: 2025-02-13

## 2025-02-12 RX ORDER — ACETAMINOPHEN 325 MG/1
650 TABLET ORAL EVERY 4 HOURS PRN
Status: CANCELLED | OUTPATIENT
Start: 2025-02-12

## 2025-02-12 RX ORDER — CALCIUM CARBONATE 500 MG/1
500 TABLET, CHEWABLE ORAL 3 TIMES DAILY PRN
Status: DISCONTINUED | OUTPATIENT
Start: 2025-02-12 | End: 2025-02-25 | Stop reason: HOSPADM

## 2025-02-12 RX ORDER — ATORVASTATIN CALCIUM 80 MG/1
80 TABLET, FILM COATED ORAL NIGHTLY
Status: CANCELLED | OUTPATIENT
Start: 2025-02-12

## 2025-02-12 RX ORDER — CLOPIDOGREL BISULFATE 75 MG/1
75 TABLET ORAL DAILY
Status: DISCONTINUED | OUTPATIENT
Start: 2025-02-13 | End: 2025-02-25 | Stop reason: HOSPADM

## 2025-02-12 RX ORDER — SODIUM CHLORIDE 0.9 % (FLUSH) 0.9 %
5-40 SYRINGE (ML) INJECTION PRN
Status: DISCONTINUED | OUTPATIENT
Start: 2025-02-12 | End: 2025-02-25 | Stop reason: HOSPADM

## 2025-02-12 RX ORDER — CLOPIDOGREL BISULFATE 75 MG/1
75 TABLET ORAL DAILY
Status: DISCONTINUED | OUTPATIENT
Start: 2025-02-13 | End: 2025-02-12

## 2025-02-12 RX ORDER — MIRTAZAPINE 15 MG/1
15 TABLET, FILM COATED ORAL NIGHTLY
Status: DISCONTINUED | OUTPATIENT
Start: 2025-02-12 | End: 2025-02-25 | Stop reason: HOSPADM

## 2025-02-12 RX ORDER — CLOPIDOGREL BISULFATE 75 MG/1
75 TABLET ORAL DAILY
Status: CANCELLED | OUTPATIENT
Start: 2025-02-13

## 2025-02-12 RX ORDER — CALCIUM CARBONATE 500 MG/1
500 TABLET, CHEWABLE ORAL 3 TIMES DAILY PRN
Status: CANCELLED | OUTPATIENT
Start: 2025-02-12

## 2025-02-12 RX ORDER — POLYETHYLENE GLYCOL 3350 17 G/17G
17 POWDER, FOR SOLUTION ORAL DAILY PRN
Status: DISCONTINUED | OUTPATIENT
Start: 2025-02-12 | End: 2025-02-18 | Stop reason: SDUPTHER

## 2025-02-12 RX ORDER — CLOPIDOGREL BISULFATE 75 MG/1
75 TABLET ORAL DAILY
Qty: 19 TABLET | Refills: 0 | Status: ON HOLD
Start: 2025-02-12 | End: 2025-02-24

## 2025-02-12 RX ORDER — CITALOPRAM HYDROBROMIDE 20 MG/1
20 TABLET ORAL DAILY
Status: DISCONTINUED | OUTPATIENT
Start: 2025-02-13 | End: 2025-02-25 | Stop reason: HOSPADM

## 2025-02-12 RX ORDER — HYDROCHLOROTHIAZIDE 25 MG/1
12.5 TABLET ORAL DAILY
Status: CANCELLED | OUTPATIENT
Start: 2025-02-13

## 2025-02-12 RX ORDER — SENNA AND DOCUSATE SODIUM 50; 8.6 MG/1; MG/1
1 TABLET, FILM COATED ORAL
Status: CANCELLED | OUTPATIENT
Start: 2025-02-12

## 2025-02-12 RX ORDER — SODIUM CHLORIDE 0.9 % (FLUSH) 0.9 %
5-40 SYRINGE (ML) INJECTION EVERY 12 HOURS SCHEDULED
Status: DISCONTINUED | OUTPATIENT
Start: 2025-02-12 | End: 2025-02-18

## 2025-02-12 RX ORDER — POLYETHYLENE GLYCOL 3350 17 G/17G
17 POWDER, FOR SOLUTION ORAL DAILY PRN
Status: CANCELLED | OUTPATIENT
Start: 2025-02-12

## 2025-02-12 RX ORDER — ACETAMINOPHEN 325 MG/1
650 TABLET ORAL EVERY 4 HOURS PRN
Status: DISCONTINUED | OUTPATIENT
Start: 2025-02-12 | End: 2025-02-25 | Stop reason: HOSPADM

## 2025-02-12 RX ORDER — ASPIRIN 81 MG/1
81 TABLET, CHEWABLE ORAL DAILY
Status: DISCONTINUED | OUTPATIENT
Start: 2025-02-13 | End: 2025-02-25 | Stop reason: HOSPADM

## 2025-02-12 RX ORDER — HYDRALAZINE HYDROCHLORIDE 10 MG/1
10 TABLET, FILM COATED ORAL 3 TIMES DAILY PRN
Status: DISCONTINUED | OUTPATIENT
Start: 2025-02-12 | End: 2025-02-25 | Stop reason: HOSPADM

## 2025-02-12 RX ORDER — INSULIN LISPRO 100 [IU]/ML
0-4 INJECTION, SOLUTION INTRAVENOUS; SUBCUTANEOUS
Status: DISCONTINUED | OUTPATIENT
Start: 2025-02-12 | End: 2025-02-23

## 2025-02-12 RX ORDER — BISACODYL 10 MG
10 SUPPOSITORY, RECTAL RECTAL DAILY PRN
Status: CANCELLED | OUTPATIENT
Start: 2025-02-12

## 2025-02-12 RX ORDER — SODIUM PHOSPHATE, DIBASIC AND SODIUM PHOSPHATE, MONOBASIC 7; 19 G/230ML; G/230ML
1 ENEMA RECTAL DAILY PRN
Status: DISCONTINUED | OUTPATIENT
Start: 2025-02-12 | End: 2025-02-25 | Stop reason: HOSPADM

## 2025-02-12 RX ORDER — MIRTAZAPINE 15 MG/1
15 TABLET, FILM COATED ORAL NIGHTLY
Status: CANCELLED | OUTPATIENT
Start: 2025-02-12

## 2025-02-12 RX ADMIN — CLOPIDOGREL BISULFATE 75 MG: 75 TABLET ORAL at 08:32

## 2025-02-12 RX ADMIN — SODIUM CHLORIDE, PRESERVATIVE FREE 10 ML: 5 INJECTION INTRAVENOUS at 21:51

## 2025-02-12 RX ADMIN — ASPIRIN 81 MG: 81 TABLET, CHEWABLE ORAL at 08:32

## 2025-02-12 RX ADMIN — SODIUM CHLORIDE, PRESERVATIVE FREE 10 ML: 5 INJECTION INTRAVENOUS at 08:32

## 2025-02-12 RX ADMIN — CITALOPRAM HYDROBROMIDE 20 MG: 20 TABLET ORAL at 08:33

## 2025-02-12 RX ADMIN — INSULIN LISPRO 1 UNITS: 100 INJECTION, SOLUTION INTRAVENOUS; SUBCUTANEOUS at 12:15

## 2025-02-12 RX ADMIN — MIRTAZAPINE 15 MG: 15 TABLET, FILM COATED ORAL at 21:51

## 2025-02-12 RX ADMIN — INSULIN LISPRO 1 UNITS: 100 INJECTION, SOLUTION INTRAVENOUS; SUBCUTANEOUS at 16:32

## 2025-02-12 RX ADMIN — ENOXAPARIN SODIUM 40 MG: 100 INJECTION SUBCUTANEOUS at 08:32

## 2025-02-12 RX ADMIN — METFORMIN HYDROCHLORIDE 1000 MG: 500 TABLET ORAL at 16:32

## 2025-02-12 RX ADMIN — LISINOPRIL 10 MG: 5 TABLET ORAL at 08:32

## 2025-02-12 RX ADMIN — HYDROCHLOROTHIAZIDE 12.5 MG: 25 TABLET ORAL at 08:32

## 2025-02-12 RX ADMIN — ATORVASTATIN CALCIUM 80 MG: 80 TABLET, FILM COATED ORAL at 21:49

## 2025-02-12 ASSESSMENT — PAIN SCALES - GENERAL: PAINLEVEL_OUTOF10: 0

## 2025-02-12 NOTE — ACP (ADVANCE CARE PLANNING)
Advance Care Planning     Advance Care Planning Activator (Inpatient)  Conversation Note      Health Care Decision Maker:  No LW/HCPOA on file, patient aware legal next of kin remain decision maker until document provided.      Current Designated Health Care Decision Maker:     Primary Decision Maker: BLANCHE FLORES - [facility] - [phone]    Primary Decision Maker: Jeff Sharp - Child - [phone]    Primary Decision Maker: Kevin Sharp - [facility] [place] [phone]    Primary Decision Maker: Burke Sharp  Child [phone]      Care Preferences DNR per MD order

## 2025-02-12 NOTE — PROGRESS NOTES
4 Eyes Skin Assessment     NAME:  Javid Sharp  YOB: 1950  MEDICAL RECORD NUMBER:  [id number]    The patient is being assessed for  Admission    I agree that at least one RN has performed a thorough Head to Toe Skin Assessment on the patient. ALL assessment sites listed below have been assessed.      Areas assessed by both nurses:    Head, Face, Ears, Shoulders, Back, Chest, Arms, Elbows, Hands, Sacrum. Buttock, Coccyx, Ischium, and Legs. Feet and Heels        Does the Patient have a Wound? No noted wound(s)       [place] Prevention initiated by RN: Yes  Wound Care Orders initiated by RN: No    Pressure Injury (Stage 3,4, Unstageable, DTI, NWPT, and Complex wounds) if present, place Wound referral order by RN under : No    New Ostomies, if present place, Ostomy referral order under : No     Nurse 1 eSignature: Electronically signed by Diane Christina RN on 2/12/25 at 4:59 PM EST    **SHARE this note so that the co-signing nurse can place an eSignature**    Nurse 2 eSignature: Electronically signed by Kristian Staley RN on 2/12/25 at 5:00 PM EST

## 2025-02-12 NOTE — CARE COORDINATION
Pt was medically cleared for dc today and transferred to [facility] for acute inpatient rehab services.  Family at the bedside at time of dc/transfer.    Service Assessment  Patient Orientation Alert and Oriented   Cognition Alert   History Provided By Patient, Child/Family, Medical Record   Primary Caregiver Self   Accompanied By/Relationship dtr; son   Support Systems Spouse/Significant Other, Children, Family Members   Patient's Healthcare Decision Maker is: Legal Next of Kin   PCP Verified by CM Yes   Last Visit to PCP Within last 3 months (11/26/2025)   Prior Functional Level Assistance with the following:, Mobility   Current Functional Level Assistance with the following:, Bathing, Dressing, Mobility   Can patient return to prior living arrangement No   Ability to make needs known: Good   Family able to assist with home care needs: Yes   Would you like for me to discuss the discharge plan with any other family members/significant others, and if so, who? Yes (spouse; dtr)   Financial Resources Medicare     Social/Functional History  Lives With Alone   Type of Home Mobile home   Home Layout One level   Home Access Stairs to enter with rails   Entrance Stairs - Number of Steps 4-5   Bathroom Shower/Tub Tub/Shower unit   Bathroom Equipment Tub transfer bench   Home Equipment Cane - Quad   Receives Help From Family   ADL Assistance Independent   Homemaking Assistance Independent   Homemaking Responsibilities Yes   Ambulation Assistance Needs assistance (uses cane)   Transfer Assistance Independent   Active  No   Mode of Transportation Family, Friends   Occupation Retired     Discharge Planning   Type of Residence Acute Rehab   Living Arrangements Spouse/Significant Other   Support Systems Spouse/Significant Other, Children, Family Members   Current Services Prior To Admission Durable Medical Equipment   Potential Assistance Needed Other (Comment) (IRF)   DME Cane   DME Ordered? No   Potential Assistance Purchasing  Medications No   Meds-to-Beds: Does the patient want to have any new prescriptions delivered to bedside prior to discharge? No   Type of Home Care Services None   Patient expects to be discharged to: Rehabilitation facility     Services At/After Discharge  Transition of Care Consult (CM Consult): Discharge Planning   Services At/After Discharge OT, PT, Nursing services, SLP   [place] Resource Information Provided? No   Confirm Follow Up Transport Family     Condition of Participation: Discharge Planning  The plan for Transition of Care is related to the following treatment goals: Acute inpatient rehab services to improve pt's strength, mobility and functional abilities for a safe transition to home.   The Patient and/or Patient Representative was provided with a Choice of Provider? Patient, Patient Representative   Name of the Patient Representative who was provided with the Choice of Provider and agrees with the Discharge Plan? dtr/Zulema   Freedom of Choice list was provided with basic dialogue that supports the individualized plan of care/goals, treatment preferences, and shares the quality data associated with the providers? Yes     KAYLEN Moseley 02/12/25 3:07 PM

## 2025-02-12 NOTE — PROGRESS NOTES
TRANSFER - OUT REPORT:    Verbal report given to JERE Abraham on Javid Sharp  being transferred to [facility] 9th 908 for routine progression of patient care       Report consisted of patient's Situation, Background, Assessment and   Recommendations(SBAR).     Information from the following report(s) Nurse Handoff Report was reviewed with the receiving nurse.           Lines:   Peripheral IV Left Antecubital (Active)   Site Assessment Clean, dry & intact 02/12/25 0718   Line Status Flushed;Capped 02/12/25 0718   Line Care Connections checked and tightened 02/12/25 0718   Phlebitis Assessment No symptoms 02/12/25 0718   Infiltration Assessment 0 02/12/25 0718   Alcohol Cap Used Yes 02/12/25 0718   Dressing Status Clean, dry & intact 02/12/25 0718   Dressing Type Transparent 02/12/25 0718        Opportunity for questions and clarification was provided.      Patient transported with:

## 2025-02-12 NOTE — DISCHARGE SUMMARY
Hospitalist Discharge Summary   Admit Date:  2/10/2025  1:18 PM   DC Note date: 2025  Name:  Javid Sharp   Age:  74 y.o.  Sex:  male  :  1950   MRN:  [id number]   Room:  [facility]  PCP:  Bjorn Birmingham MD    Presenting Complaint: Extremity Weakness     Initial Admission Diagnosis: Acute left-sided weakness [R53.1]  Acute CVA (cerebrovascular accident) ([facility]) [I63.9]     Problem List for this Hospitalization (present on admission):    Principal Problem:    Ischemic stroke of right frontal lobe ([facility])  Active Problems:    CKD stage 3b, GFR 30-44 ml/min ([facility])    Mixed hyperlipidemia    Essential hypertension    Dysarthria    Hemiplegia and hemiparesis following cerebral infarction affecting right dominant side ([facility])    History of CVA with residual deficit    Acute left-sided weakness    Type 2 diabetes mellitus with kidney complication, without long-term current use of insulin ([facility])    DNR (do not resuscitate)    Encounter for other specified aftercare    Gait abnormality    Decreased activities of daily living (ADL)  Resolved Problems:    * No resolved hospital problems. *      Hospital Course:  Javid Sharp is a 74 y.o. male with medical history of prior CVA with residual right deficits, type 2 diabetes, hypertension, depression who presented with left-sided weakness and dysarthria.  Patient with history of prior CVA with residual right weakness.  Family reported his symptoms are significantly out of proportion of normal.  having a hard time speaking and slurring his words.  He denies tobacco, alcohol, and illicit substance use.  No recent changes in medications.  He does have a history of diabetes, but does not take insulin, only metformin.  No recent infectious symptoms including fever, vomiting, diarrhea.  Says he did fall, but did not have chest pain or shortness of breath prior to falling.  In the ER, CT head obtained that shows prior CVA, but no acute bleed.  Code stroke called and  neurology evaluated.  Initial NIH score of 8 for right arm/right leg weakness and dysarthria. Patient was loaded with aspirin and Plavix.  Hospitalist service consulted for admission.       MRI confirmed stroke.  DAPT for 21d total.  Echo unremarkable.  Intpatient rehab has accepted patient.  Risk factors for stroke mostly well controlled except for DM.  In the hospital his BG are not as high as expected for his A1c, leading me to believe there is a dietary issue at home.  They will monitor more closely and work on weight loss and diet modification for better control.  Metformin increased and glipizide added.  Further adjustment as needed.  May need to change to insulin if this doesn't work.    Disposition: Inpatient Rehab  Diet: ADULT DIET; Regular; 4 carb choices (60 gm/meal); Low Fat/Low Chol/High Fiber/YOVANNY  Code Status: DNR    Follow Ups:  Follow-up Information    None         Future Appointments         Provider Specialty Dept Phone    3/26/2025 3:45 PM Bjorn Birmingham MD Family Medicine [phone]              Follow up labs/diagnostics (ultimately defer to outpatient provider):  Defer to Follow-up Provider    Plan was discussed with Patient and Child of patient.  All questions answered.  Patient was stable at time of discharge.  Instructions given to call a physician or return if any concerns.        Current Discharge Medication List        START taking these medications    Details   clopidogrel (PLAVIX) 75 MG tablet Take 1 tablet by mouth daily for 19 days  Qty: 19 tablet, Refills: 0      glipiZIDE (GLUCOTROL) 5 MG tablet Take 1 tablet by mouth 2 times daily  Qty: 60 tablet, Refills: 3           CONTINUE these medications which have CHANGED    Details   metFORMIN (GLUCOPHAGE) 1000 MG tablet Take 1 tablet by mouth 2 times daily (with meals)  Qty: 60 tablet, Refills: 1    Associated Diagnoses: Uncontrolled type 2 diabetes mellitus with hyperosmolarity without coma, with long-term current use of insulin

## 2025-02-12 NOTE — CARE COORDINATION
Case Management Assessment  Initial Evaluation    Date/Time of Evaluation: 2/12/2025 1:03 PM  Assessment Completed by: SUNG BENDER    If patient is discharged prior to next notation, then this note serves as note for discharge by case management.    Patient Name: Javid Sharp                   YOB: 1950  Diagnosis:   Debility [R53.81]                   Date / Time: 2/12/2025  9:54 AM    Patient Admission Status: REHAB IP   Readmission Risk (Low < 19, Mod (19-27), High > 27): Readmission Risk Score: 13        Current PCP: Bjorn Birmingham MD  PCP verified by CM? (P) Yes    Chart Reviewed: Yes      History Provided by: (P) Patient  Patient Orientation: (P) Alert and Oriented    Patient Cognition: (P) Alert    Hospitalization in the last 30 days (Readmission):  No    If yes, Readmission Assessment in CM Navigator will be completed.    Advance Directives:      Code Status: DNR   Patient's Primary Decision Maker is: (P) Legal Next of Kin    Primary Decision Maker: Magi Sharp - [facility] - [phone]    Discharge Planning:    Patient lives with: (P) Alone Type of Home: (P) Trailer/Mobile Home  Primary Care Giver: (P) Self  Patient Support Systems include: (P) Children   Current Financial resources: (P) Medicare, Other (Comment) (Medicare and [facility])  Current community resources: (P) None  Current services prior to admission: (P) Durable Medical Equipment            Current DME: (P) Cane            Type of Home Care services:  None    ADLS  Prior functional level: (P) Independent in ADLs/IADLs, Shopping  Current functional level: (P) Assistance with the following:, Housework, Shopping, Mobility, Cooking, Bathing, Dressing    PT AM-PAC:   /24  OT AM-PAC:   /24    Family can provide assistance at DC: (P) Yes  Would you like Case Management to discuss the discharge plan with any other family members/significant others, and if so, who? (P) Yes  Plans to Return to Present Housing: (P) Yes  Other

## 2025-02-12 NOTE — PROGRESS NOTES
[facility] OCCUPATIONAL THERAPY INITIAL NEURO EVALUATION  OT Individual Minutes  Time In: 1120  Time Out: 1205  Minutes: 45               HPI (per MD report): \"Javid Sharp is a 74 y.o. male with medical history of prior CVA with residual right deficits, type 2 diabetes, hypertension, depression who presented with left-sided weakness and dysarthria.  Patient with history of prior CVA with residual right weakness.  Family reported his symptoms are significantly out of proportion of normal.  having a hard time speaking and slurring his words.  He denies tobacco, alcohol, and illicit substance use.  No recent changes in medications.  He does have a history of diabetes, but does not take insulin, only metformin.  No recent infectious symptoms including fever, vomiting, diarrhea.  Says he did fall, but did not have chest pain or shortness of breath prior to falling.  In the ER, CT head obtained that shows prior CVA, but no acute bleed.  Code stroke called and neurology evaluated.  Initial NIH score of 8 for right arm/right leg weakness and dysarthria. Patient was loaded with aspirin and Plavix.  Hospitalist service consulted for admission.\"  Past Medical History:   Diagnosis Date    Diabetes ([facility])     Stroke (cerebrum) ([facility])     September 2017       Patient's Goal: Improve I/ADL (I) and mobility safety  Pain: No pain expressed.  Precautions: Falls, R Hemiplegia , and Cognitive Impairment  History of Neurological Event(s): Yes, L CVA ~8 years ago, residual deficits RUE increased spasticity/flexion, R shoulder partially functional for scoot/balance  Prior Level of Function: Per pt and family, living alone Mod I, however decreased safety awareness leading to 2-3 falls in past 6mo. Pt ceased driving after 2017 L CVA. Since prior CVA pt has had hyper sensitivity to sounds/lights and difficulties with proprioception and spatial awareness all exacerbated by present dx.  Lines:N/A  O2 Device: RA    LIVING SITUATION:    Environment  arrived. Initial evaluation abbreviated to allow pt time for meal. Increased time for questionnaire, assessment and discussion especially around pt CVA hx and residual effects. Further testing and assessment of multiple regions to be completed next session as able. Interdisciplinary discussion with PT, OT and MD regarding possible splinting. Increased education with pt family around presenting skin integrity concerns with R hand contracted to fist and current nail length. Pt family verbalized understanding.  Interdisciplinary Communication: Collaborated with PT, RN, and SLP regarding patient's current level of function, plan of care, and safety measures.   Patient/Family Education: Patient and Family educated On the role of OT, On POC, and On IRC expectations.    GOALS:  Short Term Goals:  Time Frame for Short Term Goals: 7 days   STG 1: Patient will dress UB with Blair using AE/DME PRN.  STG 2: Patient will dress LB with Blair using AE/DME PRN.  STG 3: Patient will don footwear with Blair consistently using AE/DME PRN.  STG 4: Patient will bathe with Blair consistently using AE/DME PRN.  STG 5: Patient will toilet with Blair using AE/DME PRN.    Long Term Goals:  Time Frame for Long Term Goals: 21 days   LTG 1: Patient will dress UB with Supervision or Touching Assistance using AE/DME PRN.   LTG 2: Patient will dress LB with Supervision or Touching Assistance using AE/DME PRN.   LTG 3: Patient will don footwear with Supervision or Touching Assistance using AE/DME PRN.   LTG 4: Patient will bathe with Supervision or Touching Assistance using AE/DME PRN.   LTG 5: Patient will toilet with Supervision or Touching Assistance using AE/DME PRN.   LTG 6: Patient/caregiver will verbalize understanding of OT recommendations regarding ADLs, functional transfers, home safety, AE/DME, energy conservation, safety awareness, activity tolerance, and follow-up therapy to increase safety with functional tasks upon discharge.    MD order

## 2025-02-13 ENCOUNTER — CARE COORDINATION (OUTPATIENT)
Dept: CARE COORDINATION | Facility: CLINIC | Age: 75
End: 2025-02-13

## 2025-02-13 LAB
ANION GAP SERPL CALC-SCNC: 9 MMOL/L (ref 7–16)
BASOPHILS # BLD: 0.07 K/UL (ref 0–0.2)
BASOPHILS NFR BLD: 0.8 % (ref 0–2)
BUN SERPL-MCNC: 20 MG/DL (ref 8–23)
CALCIUM SERPL-MCNC: 9.1 MG/DL (ref 8.8–10.2)
CHLORIDE SERPL-SCNC: 100 MMOL/L (ref 98–107)
CO2 SERPL-SCNC: 28 MMOL/L (ref 20–29)
CREAT SERPL-MCNC: 1.73 MG/DL (ref 0.8–1.3)
DIFFERENTIAL METHOD BLD: ABNORMAL
EOSINOPHIL # BLD: 0.41 K/UL (ref 0–0.8)
EOSINOPHIL NFR BLD: 4.8 % (ref 0.5–7.8)
ERYTHROCYTE [DISTWIDTH] IN BLOOD BY AUTOMATED COUNT: 13.4 % (ref 11.9–14.6)
GLUCOSE BLD STRIP.AUTO-MCNC: 108 MG/DL (ref 65–100)
GLUCOSE BLD STRIP.AUTO-MCNC: 122 MG/DL (ref 65–100)
GLUCOSE BLD STRIP.AUTO-MCNC: 130 MG/DL (ref 65–100)
GLUCOSE BLD STRIP.AUTO-MCNC: 152 MG/DL (ref 65–100)
GLUCOSE SERPL-MCNC: 157 MG/DL (ref 70–99)
HCT VFR BLD AUTO: 39.5 % (ref 41.1–50.3)
HGB BLD-MCNC: 13.7 G/DL (ref 13.6–17.2)
IMM GRANULOCYTES # BLD AUTO: 0.04 K/UL (ref 0–0.5)
IMM GRANULOCYTES NFR BLD AUTO: 0.5 % (ref 0–5)
LYMPHOCYTES # BLD: 2.53 K/UL (ref 0.5–4.6)
LYMPHOCYTES NFR BLD: 29.5 % (ref 13–44)
MAGNESIUM SERPL-MCNC: 1.9 MG/DL (ref 1.8–2.4)
MCH RBC QN AUTO: 30.7 PG (ref 26.1–32.9)
MCHC RBC AUTO-ENTMCNC: 34.7 G/DL (ref 31.4–35)
MCV RBC AUTO: 88.6 FL (ref 82–102)
MONOCYTES # BLD: 0.75 K/UL (ref 0.1–1.3)
MONOCYTES NFR BLD: 8.7 % (ref 4–12)
NEUTS SEG # BLD: 4.78 K/UL (ref 1.7–8.2)
NEUTS SEG NFR BLD: 55.7 % (ref 43–78)
NRBC # BLD: 0 K/UL (ref 0–0.2)
PLATELET # BLD AUTO: 245 K/UL (ref 150–450)
PMV BLD AUTO: 9.3 FL (ref 9.4–12.3)
POTASSIUM SERPL-SCNC: 4.2 MMOL/L (ref 3.5–5.1)
RBC # BLD AUTO: 4.46 M/UL (ref 4.23–5.6)
SERVICE CMNT-IMP: ABNORMAL
SODIUM SERPL-SCNC: 137 MMOL/L (ref 136–145)
WBC # BLD AUTO: 8.6 K/UL (ref 4.3–11.1)

## 2025-02-13 PROCEDURE — 6370000000 HC RX 637 (ALT 250 FOR IP): Performed by: STUDENT IN AN ORGANIZED HEALTH CARE EDUCATION/TRAINING PROGRAM

## 2025-02-13 PROCEDURE — 1180000000 HC REHAB R&B

## 2025-02-13 PROCEDURE — 85025 COMPLETE CBC W/AUTO DIFF WBC: CPT

## 2025-02-13 PROCEDURE — 97162 PT EVAL MOD COMPLEX 30 MIN: CPT

## 2025-02-13 PROCEDURE — 97530 THERAPEUTIC ACTIVITIES: CPT

## 2025-02-13 PROCEDURE — 80048 BASIC METABOLIC PNL TOTAL CA: CPT

## 2025-02-13 PROCEDURE — 97551 CAREGIVER TRAING EA ADDL 15: CPT

## 2025-02-13 PROCEDURE — 6360000002 HC RX W HCPCS: Performed by: STUDENT IN AN ORGANIZED HEALTH CARE EDUCATION/TRAINING PROGRAM

## 2025-02-13 PROCEDURE — 97535 SELF CARE MNGMENT TRAINING: CPT

## 2025-02-13 PROCEDURE — 82962 GLUCOSE BLOOD TEST: CPT

## 2025-02-13 PROCEDURE — 97112 NEUROMUSCULAR REEDUCATION: CPT

## 2025-02-13 PROCEDURE — 99232 SBSQ HOSP IP/OBS MODERATE 35: CPT | Performed by: STUDENT IN AN ORGANIZED HEALTH CARE EDUCATION/TRAINING PROGRAM

## 2025-02-13 PROCEDURE — 83735 ASSAY OF MAGNESIUM: CPT

## 2025-02-13 PROCEDURE — 97110 THERAPEUTIC EXERCISES: CPT

## 2025-02-13 PROCEDURE — 36415 COLL VENOUS BLD VENIPUNCTURE: CPT

## 2025-02-13 PROCEDURE — 92610 EVALUATE SWALLOWING FUNCTION: CPT

## 2025-02-13 PROCEDURE — 97116 GAIT TRAINING THERAPY: CPT

## 2025-02-13 RX ADMIN — HYDROCHLOROTHIAZIDE 12.5 MG: 25 TABLET ORAL at 08:22

## 2025-02-13 RX ADMIN — CLOPIDOGREL BISULFATE 75 MG: 75 TABLET ORAL at 08:22

## 2025-02-13 RX ADMIN — MIRTAZAPINE 15 MG: 15 TABLET, FILM COATED ORAL at 21:12

## 2025-02-13 RX ADMIN — ENOXAPARIN SODIUM 40 MG: 100 INJECTION SUBCUTANEOUS at 08:22

## 2025-02-13 RX ADMIN — METFORMIN HYDROCHLORIDE 1000 MG: 500 TABLET ORAL at 17:39

## 2025-02-13 RX ADMIN — GLIPIZIDE 5 MG: 5 TABLET ORAL at 08:22

## 2025-02-13 RX ADMIN — ASPIRIN 81 MG: 81 TABLET, CHEWABLE ORAL at 08:22

## 2025-02-13 RX ADMIN — METFORMIN HYDROCHLORIDE 1000 MG: 500 TABLET ORAL at 08:22

## 2025-02-13 RX ADMIN — CITALOPRAM HYDROBROMIDE 20 MG: 20 TABLET ORAL at 08:22

## 2025-02-13 RX ADMIN — ATORVASTATIN CALCIUM 80 MG: 80 TABLET, FILM COATED ORAL at 21:12

## 2025-02-13 RX ADMIN — LISINOPRIL 10 MG: 5 TABLET ORAL at 08:21

## 2025-02-13 ASSESSMENT — PAIN SCALES - GENERAL
PAINLEVEL_OUTOF10: 0
PAINLEVEL_OUTOF10: 0

## 2025-02-13 NOTE — PROGRESS NOTES
[facility] OCCUPATIONAL THERAPY DAILY NOTE  OT Individual Minutes  Time In: 0704  Time Out: 0820  Minutes: 76               Subjective: Pt agreeable to treatment. \"I used to could get my leg [R] up here [on L knee]\"  Pain: No pain expressed.  Interdisciplinary Communication: Collaborated with PT, RN, and SLP regarding pt status, pt performance, and handoff communication.   Precautions: Falls, Poor Safety Awareness, [place] Alarm, and R Hemiplegia   Lines:N/A  O2 Device: RA      FUNCTIONAL MOBILITY:       Bed Mobility CGA    Sit to Stand Blair    Transfer  Blair and ModA  Transfer Type: SPT  Equipment:  HHA/incr support at subaxillary region    Ambulation NT  Equipment: NA      ACTIVITIES OF DAILY LIVING:       Eating Supervision or touching assistance Sup-V - S/U seated in recliner   Oral Hygiene Supervision or touching assistance CGA-SBA seated   Shower/Bathe Partial/moderate assistance CGA/Mod-Min A UB/LB bathing seated and standing at TTB with grab bars, HHSH and sig incr time, 1-3 verbal cues for directions and safety, 1 minor LOB on TTB pt self-recovered   Upper Body  Dressing Partial/moderate assistance Min A - CGA doff/don gown/shirt seated, pt exhibits good carry-over from prior edu/training since last CVA   Lower Body Dressing Substantial/maximal assistance Max-Mod A doff/don underwear and pants seated and standing with RW, sig incr time, 1-3 verbal cues for directions, incr tone/spasticity noted in RLE and verbalized by pt   Donning/[place] Footwear Partial/moderate assistance Mod A doff/don socks and shoes, pt able to doff/don L sock/shoe (velcro) seated, incr asssit R sock, AFO and shoe (velcro)   Toileting Hygiene Partial/moderate assistance Mod-Min A   Toilet Transfer Partial/moderate assistance Mod-Min A SPT with HHA/incr support at sub axillary region   Education Adaptive ADL Techniques, AE/DME Training, Benefits of OT, Energy Conservation, Pacing, Family Training, Functional Transfer Training, [place] Dressing  Strategies, Precautions, Rolling Walker Management, Safety Awareness, and Stroke Education     Assessment: Patient tolerated session well. Pt dtr present throughout session. Reviewed with pt dtr home set-up and d/c support. Pt dtr reports pt able to d/c home with 24/7 Sup-V or home with family member as needed pending progress. Reviewed functional AE/AD; adaptive cutting board, dycem, utensils, etc. Handouts for procurement as needed provided. Pt dtr reports prior to most recent CVA pt intermittently on ladders and using power tools. Most notably pt using table saw ~4-6mo ago and piece of wood kicked back 1-3 times leaving pt with large contusion/bruising on chest. Reviewed eliminating access to power tools for time bring, pt dtr verbalized understanding and agreement. Increased time overall for reduced pacing and increased time for patient secondary to hypersensitivity to lights, sounds, external/internal movements. Pt demonstrated good participation in OT treatment. Pt continues to benefit from skilled OT services to address remaining deficits and progress toward baseline level of independence and safety. Patient ended session seated in recliner with call bell and needs within reach, with chair/bed alarm activated, with family member at bedside, with legs elevated, and with RN/CNA.   Plan: Continue OT POC.     HELEN LAZARO, OT   2/13/2025

## 2025-02-13 NOTE — PROGRESS NOTES
[place] [address]  Tel: [phone]     Physical Medicine and Rehabilitation Progress Note      Javid Sharp  Admit Date: 2/12/2025  Admit Diagnosis: Debility [R53.81]  Acute CVA (cerebrovascular accident) ([facility]) [I63.9]    Subjective     Patient seen this morning during break in therapy. Patient very knowledgeable about his health and provides good history (remote and recent). He reports sleeping \"well enough\" and is excited about starting rehab.  Discussed lab results with patient and his wife.  All questions and concerns were addressed at this time.    Objective:     Current Facility-Administered Medications   Medication Dose Route Frequency    aspirin chewable tablet 81 mg  81 mg Oral Daily    atorvastatin (LIPITOR) tablet 80 mg  80 mg Oral Nightly    citalopram (CELEXA) tablet 20 mg  20 mg Oral Daily    enoxaparin (LOVENOX) injection 40 mg  40 mg SubCUTAneous Daily    Glucagon Emergency KIT 1 mg  1 mg SubCUTAneous PRN    glucose chewable tablet 16 g  4 tablet Oral PRN    insulin lispro (HUMALOG,ADMELOG) injection vial 0-4 Units  0-4 Units SubCUTAneous 4x Daily AC & HS    lisinopril (PRINIVIL;ZESTRIL) tablet 10 mg  10 mg Oral Daily    And    hydroCHLOROthiazide (HYDRODIURIL) tablet 12.5 mg  12.5 mg Oral Daily    mirtazapine (REMERON) tablet 15 mg  15 mg Oral Nightly    sodium chloride flush 0.9 % injection 5-40 mL  5-40 mL IntraVENous 2 times per day    sodium chloride flush 0.9 % injection 5-40 mL  5-40 mL IntraVENous PRN    glipiZIDE (GLUCOTROL) tablet 5 mg  5 mg Oral QAM AC    metFORMIN (GLUCOPHAGE) tablet 1,000 mg  1,000 mg Oral BID WC    acetaminophen (TYLENOL) tablet 650 mg  650 mg Oral Q4H PRN    polyethylene glycol (GLYCOLAX) packet 17 g  17 g Oral Daily PRN    sennosides-docusate sodium (SENOKOT-S) 8.6-50 MG tablet 1 tablet  1 tablet Oral QHS    sodium phosphate (FLEET) rectal enema 1 enema  1 enema Rectal Daily PRN    bisacodyl

## 2025-02-13 NOTE — PROGRESS NOTES
PHYSICAL THERAPY EXAMINATION    PT Individual Minutes  Time In: 0838  Time Out: 0942  Minutes: 64                   Total Treatment Time:  64 Minutes         HPI:  74 y.o. male with medical history of prior CVA with residual right deficits, type 2 diabetes, hypertension, depression who presented with left-sided weakness and dysarthria. Patient with history of prior CVA with residual right weakness. Family reported his symptoms are significantly out of proportion of normal. having a hard time speaking and slurring his words. Says he did fall, but did not have chest pain or shortness of breath prior to falling. In the ER, CT head obtained that shows prior CVA, but no acute bleed.     Past Medical History:   Past Medical History:   Diagnosis Date    Diabetes ([facility])     Stroke (cerebrum) ([facility])     September 2017       Pt's Goal:  would like to be ind in his home      Precautions:    Restrictions/Precautions: Fall Risk  Required Braces or Orthoses?: Yes        Right Lower Extremity Brace: Ankle Foot Orthotics  Other Position/Activity Restrictions: Pt has AFO fron [employer]. Has not had adjustments in a few years. Also has custom diabetic shoes    Impairments:    Decreased LE strength  [x]     Decreased strength trunk/core  [x]     Decreased AROM   [x]     Decreased PROM  [x]    Decreased endurance  [x]     Decreased balance sitting  []     Decreased balance standing  [x]     Pain   []     Slow ambulation velocity  [x]    Decreased coordination  [x]    Decreased safety awareness  [x]      Functional Limitations:   Decreased independence with bed mobility  [x]     Decreased independence with functional transfers  [x]     Decreased independence with ambulation  [x]     Decreased independence with stair negotiation  [x]       Previous Functional Level: pt experienced a brainstem CVA back in 2017, resulting in R sided deficits/spastic hemiplegia. At baseline since this, pt has progressed to ambulating  with a hurrycane and

## 2025-02-13 NOTE — PROGRESS NOTES
[facility] OCCUPATIONAL THERAPY DAILY NOTE  OT Individual Minutes  Time In: 1113  Time Out: 1159  Minutes: 46               Subjective: Pt agreeable to treatment. \"Some times I have to go around the [place] before I can think of the name pencil'\"  Pain: No pain expressed.  Interdisciplinary Communication: Collaborated with PT, RN, and SLP regarding pt status and pt performance.   Precautions: Falls, [place] Alarm, and R Hemiplegia   Lines:N/A  O2 Device: RA    FUNCTIONAL MOBILITY:       Bed Mobility NT    Sit to Stand Blair    Transfer  Blair and ModA  Transfer Type: SPT  Equipment:  HHA/incr support at subaxillary region    Ambulation NT  Equipment: NA       - Activity Tolerance - Cognition - Coordination - Energy Conservation/Pacing  - Neuro-Muscular Re-Education - Range of Motion - Strengthening   Pt completed therapeutic activities to challenge  LUE AROM/STR, L hand(s) dexterity/pinch/tactile reception, FM/GM coordination, in-hand translation, activity tolerance, problem solving, and STM recall in preparation for safe return to max (I) with I/ADLs. Pt tolerated Pt tolerated activities well with no c/o pain. . Rest breaks utilized as needed throughout..   Pt completed object identification/labeling seated at table. Objects placed on L, name cards placed at L, midline and R. Pt retrieved object from L using LUE. Once retrieved pt verbalized name of object then scanned name cards, identified matching card and placed object on card using LUE. Pt completed 18 item(s) total. Pt required appropriate time, <5min with 1-3 verbal cues for directions and problem solving. 1-3 drops noted.  Pt completed object identification task seated at table.Focus on task to assess and challenge pt stereognosis. Pt used L hand reached into covered pillow case at midline. End of task reviewed pt prior c/o difficulty with word finding and naming of objects. Pt stated issue persists with everyday objects 1-3x a day however presently pt not having same  participation in OT treatment. Pt continues to benefit from skilled OT services to address remaining deficits and progress toward baseline level of independence and safety. Patient ended session seated in recliner with call bell and needs within reach, with chair/bed alarm activated, and with lunch tray set up .   Plan: Continue OT POC.     HELEN LAZARO OT   2/13/2025

## 2025-02-13 NOTE — THERAPY EVALUATION
[facility]  SPEECH LANGUAGE PATHOLOGY: DYSPHAGIA Initial Assessment    NAME: Javid Sharp  : 1950  MRN: [id number]    ADMISSION DATE: 2025  ADMITTING DIAGNOSIS: Debility [R53.81]  Acute CVA (cerebrovascular accident) ([facility]) [I63.9]  PRIMARY DIAGNOSIS: Acute CVA (cerebrovascular accident) ([facility])  ICD-10: Treatment Diagnosis: R13.12 Dysphagia, Oropharyngeal Phase    RECOMMENDATIONS   Diet Recommendation:   regular  thin liquids-straw sip  Medications: as tolerated     Compensatory Swallowing Strategies:  upright positioning and liquid wash after dry solids   Additional Recommendations:  Speech/Cognitive linguistic evaluation  F/up diet tolerance monitoring      ASSESSMENT    patient presents with functional oropharyngeal swallow characterized by min reduced labial strength resulting in occasional slight anterior loss with liquids via cup but pt compensating using straw. he endorses occasional difficulty with dry/coarse consistencies. recommend regular diet and thin liquids. pt needs set up assist.    GENERAL    History of Present Injury/Illness: per chart, \"HPI: Javid Sharp is a 74 y.o. male patient who presented to [facility] on 2/10/2025 secondary to left-sided weakness.  Per H&P \"Javid Sharp is a 74 y.o. male with medical history of prior CVA with residual right deficits, type 2 diabetes, hypertension, depression who presented with left-sided weakness and dysarthria.  Patient with history of prior CVA with residual right weakness.  Family reported his symptoms are significantly out of proportion of normal.  having a hard time speaking and slurring his words.  He denies tobacco, alcohol, and illicit substance use.  No recent changes in medications.  He does have a history of diabetes, but does not take insulin, only metformin.  No recent infectious symptoms including fever, vomiting, diarrhea.  Says he did fall, but did not have chest pain or shortness of breath prior to falling.  In  the ER, CT head obtained that shows prior CVA, but no acute bleed.  Code stroke called and neurology evaluated.  Initial NIH score of 8 for right arm/right leg weakness and dysarthria. Patient was loaded with aspirin and Plavix.  Hospitalist service consulted for admission.\" Due to ongoing functional deficits, PM&R consulted to evaluate patient's rehabilitation and ongoing medical needs to determine best suited placement for patient once discharged from acute care.   Patient still shows significant functional deficits. We continue to recommend inpatient hospital rehabilitation as reasonable and necessary due to ongoing acute medical issues which have not changed since initial pre-admission evaluation. Preadmission screening reviewed, approved the patient for admission to the IRF. Attending service cleared patient for transfer to rehab today. Patient continues to have ongoing medical issues outlined above requiring continued physician medical supervision. Due to ongoing functional deficits, patient will benefit from continued intensive therapies.   Patient seen this morning. Discussed insomnia, improved last night. Ongoing cough, denies aspiration. Will have SLP evaluate. Goals and expectations of inpatient rehabilitation were discussed, all questions and concerns were addressed with the patient at this time.\"    Dysphagia History  Current Diet: Regular and Thin Liquid  Patient evaluated at bedside in acute care with recommendations for regular/thin liquid diet.  Complaints: occasional coughing fits after dry/coarse consistencies. Patient reports speech close to baseline (Mild dysarthria from prior stroke 7 years ago). He does report he requires increased time, communication partner speaking more slowly to assist with processing s/p recent CVA.    MBSS in 2017 with recommendation for regular diet and thin liquids.       Subjective: \"the other speech therapists told me to talk loud and I can't. This is how I

## 2025-02-13 NOTE — PLAN OF CARE
Problem: Safety - Adult  Goal: Free from fall injury  2/13/2025 0955 by Michelle Burns RN  Outcome: Progressing  2/12/2025 2020 by Radha Love RN  Outcome: Progressing     Problem: Chronic Conditions and Co-morbidities  Goal: Patient's chronic conditions and co-morbidity symptoms are monitored and maintained or improved  2/13/2025 0955 by Michelle Burns, RN  Outcome: Progressing  2/12/2025 2020 by Radha Love RN  Outcome: Progressing  Flowsheets (Taken 2/12/2025 1953)  Care Plan - Patient's Chronic Conditions and Co-Morbidity Symptoms are Monitored and Maintained or Improved: Monitor and assess patient's chronic conditions and comorbid symptoms for stability, deterioration, or improvement     Problem: Pain  Goal: Verbalizes/displays adequate comfort level or baseline comfort level  2/13/2025 0955 by Michelle Burns, RN  Outcome: Progressing  2/12/2025 2020 by Radha Love RN  Outcome: Progressing     Problem: Skin/Tissue Integrity  Goal: Skin integrity remains intact  Description: 1.  Monitor for areas of redness and/or skin breakdown  2.  Assess vascular access sites hourly  3.  Every 4-6 hours minimum:  Change oxygen saturation probe site  4.  Every 4-6 hours:  If on nasal continuous positive airway pressure, respiratory therapy assess nares and determine need for appliance change or resting period  Outcome: Progressing

## 2025-02-13 NOTE — CARE COORDINATION
Transition of care outreach postponed for 7 days due to patient's discharge to [facility] inpatient rehab facility.

## 2025-02-13 NOTE — H&P
[facility]  Inpatient Rehab Program    Admission History and Physical Exam  INPATIENT REHABILITATION CENTER      IRC Admission Date: 2/12/2025  Primary Care Provider: Bjorn Birmingham MD  Specialty Group / Referring Service: medicine      Chief Complaint : limited mobility  Admitting Diagnosis:   Debility [R53.81]  Acute CVA (cerebrovascular accident) ([facility]) [I63.9]    Principal Problem:    Acute CVA (cerebrovascular accident) ([facility])  Active Problems:    CKD stage 3b, GFR 30-44 ml/min ([facility])    Essential hypertension    Dysarthria    Type 2 diabetes mellitus with hyperglycemia, without long-term current use of insulin ([facility])    Hemiplegia and hemiparesis following cerebral infarction affecting right dominant side ([facility])    History of CVA with residual deficit    Acute left-sided weakness    DNR (do not resuscitate)    Gait abnormality    Decreased independence with activities of daily living  Resolved Problems:    * No resolved hospital problems. *      Acute Rehab Diagnoses:  Encounter for rehabilitation [Z51.89]   Abnormality of gait and mobility [R26.9]  Decreased independence for activities of daily living (ADL) [Z78.9]  Physical debility / deconditioning [R53.81]      Medical Dx:  Past Medical History:   Diagnosis Date    Diabetes ([facility])     Stroke (cerebrum) ([facility])     September 2017       Date of Evaluation: February 12, 2025      HPI: Javid Sharp is a 74 y.o. male patient who presented to [facility] on 2/10/2025 secondary to left-sided weakness.  Per H&P \"Javid Sharp is a 74 y.o. male with medical history of prior CVA with residual right deficits, type 2 diabetes, hypertension, depression who presented with left-sided weakness and dysarthria.  Patient with history of prior CVA with residual right weakness.  Family reported his symptoms are significantly out of proportion of normal.  having a hard time speaking and slurring his words.  He denies tobacco,        //Hyperlipidemia  -Continue Lipitor       //CKD 3  -Creatinine currently at baseline ~1.6  -Continue monitoring BMP       //Obese  -Complicating all aspects of medical care, contributing to functional decline  Body mass index is 30.68 kg/m².                     CODE STATUS: DNR      DVT prophylaxis: Mobilization with PT and OT. Chemical prophylaxis as indicated     Labs: CBC, BMP to be ordered on admission to rehab unit and will repeat as needed.                 Will require continued close monitoring of the following systems:  -CV: Monitor blood pressure and heart rate.  Adjust medications as needed.  History of hypertension  -Endocrine: Monitor glycemia and adjust pharmacologic regimen as needed.  -Respiratory: Respiratory therapy consult.  Incentive spirometer every 2 hours while awake, as needed.  To be followed by respiratory therapist as needed.  -Sleep: Monitor sleep cycles.   -Skin: Monitor incisions/wounds for adequate healing.  Wound care coordinator consulted as needed.  Frequent turning while in bed and repositioning in chair.  Monitor for skin breakdown or erythema.  -Psychiatric: Monitor for signs and symptoms of depression.  Monitor appetite.  Monitor for depression anxiety as the patient is adjusting to disability.  Monitor and adjust medications as needed.  -Nutrition: Continue current diet and adjust as needed and as tolerated.  Consult dietitian for nutritional assessment and recommendations.  -ID: Patient will be monitored closely for any signs or symptoms of infection, such as elevated white blood cell count, increased temperature, signs of UTI.    -Pain: The patient will be monitored closely for signs and symptoms of pain.  Pain regimen will be adjusted as needed.  -Cognitive function/mental status: Has the potential for residual deficits in orientation, processing, attention, thought organization, problem solving, reasoning, word retrieval, and memory given recent injury. Therefore,

## 2025-02-13 NOTE — PROGRESS NOTES
PHYSICAL THERAPY DAILY NOTE    PT Individual Minutes  Time In: 1430  Time Out: 1515  Minutes: 45                 Total Treatment Time:  45 Minutes    Pt. Seen for: PM, Balance Training, Gait Training, Patient Education, Therapeutic Exercise, and Transfer Training     Subjective: Patient agreeable to second treatment today.         Objective:  Restrictions/Precautions: Fall Risk  Required Braces or Orthoses?: Yes           Right Lower Extremity Brace: Ankle Foot Orthotics  Other Position/Activity Restrictions: Pt has AFO fron [facility]. Has not had adjustments in a few years. Also has custom diabetic shoes         GROSS ASSESSMENT   NT        COGNITION Daily Assessment    Overall Orientation Status: Within Normal Limits  Orientation Level: Oriented X4   Overall Cognitive Status: Exceptions  Arousal/Alertness: Appears intact  Following Commands: Follows multistep commands with repitition, Follows multistep commands with increased time  Attention Span: Appears intact  Memory: Appears intact  Safety Judgement: Appears intact  Problem Solving: Assistance required to generate solutions  Insights: Fully aware of deficits  Initiation: Requires cues for some  Sequencing: Requires cues for some        BED/MAT MOBILITY Daily Assessment     Supine to Sit: Minimal assistance;Partial/Moderate assistance  Scooting: Minimal assistance        TRANSFERS Daily Assessment   Additional time and verbal cues Sit to Stand: Minimal Assistance  Stand to Sit: Minimal Assistance  Stand Pivot Transfers: Minimal Assistance          GAIT Daily Assessment   Therapist assist to educate LLE movement. Surface: Level tile  Device: Single point cane;Parallel Bars  Other Apparatus: AFO  Assistance: Contact guard assistance;Minimal assistance  Quality of Gait: Blair to assist cordinated movements of LLE  Gait Deviations: Slow Jenni;Decreased step length;Decreased step height  Distance: 15 (ft) X 2  Comments: Ataxic gait pattern with scissoring and

## 2025-02-14 LAB
GLUCOSE BLD STRIP.AUTO-MCNC: 122 MG/DL (ref 65–100)
GLUCOSE BLD STRIP.AUTO-MCNC: 156 MG/DL (ref 65–100)
GLUCOSE BLD STRIP.AUTO-MCNC: 159 MG/DL (ref 65–100)
GLUCOSE BLD STRIP.AUTO-MCNC: 90 MG/DL (ref 65–100)
SERVICE CMNT-IMP: ABNORMAL
SERVICE CMNT-IMP: NORMAL

## 2025-02-14 PROCEDURE — 99232 SBSQ HOSP IP/OBS MODERATE 35: CPT | Performed by: STUDENT IN AN ORGANIZED HEALTH CARE EDUCATION/TRAINING PROGRAM

## 2025-02-14 PROCEDURE — 97112 NEUROMUSCULAR REEDUCATION: CPT

## 2025-02-14 PROCEDURE — 97530 THERAPEUTIC ACTIVITIES: CPT

## 2025-02-14 PROCEDURE — 97760 ORTHOTIC MGMT&TRAING 1ST ENC: CPT

## 2025-02-14 PROCEDURE — 82962 GLUCOSE BLOOD TEST: CPT

## 2025-02-14 PROCEDURE — 6370000000 HC RX 637 (ALT 250 FOR IP): Performed by: STUDENT IN AN ORGANIZED HEALTH CARE EDUCATION/TRAINING PROGRAM

## 2025-02-14 PROCEDURE — 6360000002 HC RX W HCPCS: Performed by: STUDENT IN AN ORGANIZED HEALTH CARE EDUCATION/TRAINING PROGRAM

## 2025-02-14 PROCEDURE — 97110 THERAPEUTIC EXERCISES: CPT

## 2025-02-14 PROCEDURE — 1180000000 HC REHAB R&B

## 2025-02-14 PROCEDURE — 92523 SPEECH SOUND LANG COMPREHEN: CPT

## 2025-02-14 RX ADMIN — METFORMIN HYDROCHLORIDE 1000 MG: 500 TABLET ORAL at 08:35

## 2025-02-14 RX ADMIN — CLOPIDOGREL BISULFATE 75 MG: 75 TABLET ORAL at 08:37

## 2025-02-14 RX ADMIN — CITALOPRAM HYDROBROMIDE 20 MG: 20 TABLET ORAL at 08:36

## 2025-02-14 RX ADMIN — LISINOPRIL 10 MG: 5 TABLET ORAL at 08:37

## 2025-02-14 RX ADMIN — ENOXAPARIN SODIUM 40 MG: 100 INJECTION SUBCUTANEOUS at 08:37

## 2025-02-14 RX ADMIN — METFORMIN HYDROCHLORIDE 1000 MG: 500 TABLET ORAL at 18:19

## 2025-02-14 RX ADMIN — HYDROCHLOROTHIAZIDE 12.5 MG: 25 TABLET ORAL at 08:36

## 2025-02-14 RX ADMIN — MIRTAZAPINE 15 MG: 15 TABLET, FILM COATED ORAL at 20:35

## 2025-02-14 RX ADMIN — GLIPIZIDE 5 MG: 5 TABLET ORAL at 08:37

## 2025-02-14 RX ADMIN — ATORVASTATIN CALCIUM 80 MG: 80 TABLET, FILM COATED ORAL at 20:35

## 2025-02-14 RX ADMIN — ASPIRIN 81 MG: 81 TABLET, CHEWABLE ORAL at 08:36

## 2025-02-14 NOTE — PROGRESS NOTES
[facility] OCCUPATIONAL THERAPY DAILY NOTE  OT Individual Minutes  Time In: 0827  Time Out: 1000  Minutes: 93               Subjective: Pt agreeable to treatment. \"To the gym\"  Pain: No pain expressed.  Interdisciplinary Communication: Collaborated with PT, RN, and SLP regarding pt status and pt performance.   Precautions: Falls, [place] Alarm, and R Hemiplegia   Lines:N/A  O2 Device: RA    FUNCTIONAL MOBILITY:       Bed Mobility CGA    Sit to Stand CGA    Transfer  CGA  Transfer Type: SPT  Equipment:  HHA    Ambulation NT  Equipment: NA       - Activity Tolerance - Balance - Cognition - Coordination - Energy Conservation/Pacing  - Neuro-Muscular Re-Education - Range of Motion - Strengthening   Pt completed therapeutic activities to challenge  LUE AROM/STR, L hand(s) dexterity/pinch, FM/GM coordination, in-hand translation, core stability, static balance, dynamic balance, standing tolerance, activity tolerance, problem solving, STM recall, and Sequencing in preparation for safe return to max (I) with I/ADLs. Pt tolerated Pt tolerated activities well with no c/o pain. . Rest breaks utilized as needed throughout..   Pt completed [place] numbers board seated at table. Pt retrieved yellow discs from grid on table top on R and transferred to grid on 70 deg incline with L hand. Pt completed 1-60 without errors. 1-3 drops noted throughout  Pt completed mini peg and board activity seated at table. Pt retrieved mini pegs from pile at L, midline and R using L hand. Pt retrieved pegs one-by-one and placed into board per written instructions. Pt completed 100% \"I heart OT\" task(s). Pt required increased time with 5+ verbal/visual cues directions, error recognition and problem solving.     Pt completed dice transfer activity seated at table.   Inferior pincer grasp with thumb and 2nd digit; thumb grasp thumb and 2nd digit to retrieve, die held in hand and transferred to midline. Die translated from palm to thumb and 2nd digit pincer  grasp and placed on table. vertical placement, all 1s - 6s order; 1 set total   Pt completed laundry management activity standing at table with gaitbelt/CGA. Pt retrieved various laundry articles from pile on R using LUE. Pt then folded items using LUE and sorted by article on L with LUE. Pt completed 24 articles [washcloth(s), towel(s), sock(s), pant(s)/short(s), and shirt(s)]. Pt required significantly increased time with 1-3 verbal cues for directions. Pt tolerated standing well, pt stood 10-15 min with increased seated rest break after completion.         - Orthtotic management   Pt R hand presenting with less tone presently. Pt fitted with appropriate cone size, orthotic fabricated. Fit ensured. Initial education provided. Continued education to be completed next session(s) as needed     Education   Adaptive ADL Techniques, AE/DME Training, Benefits of OT, Energy Conservation, Pacing, Functional Transfer Training, Precautions, Rolling Walker Management, Safety Awareness, and Stroke Education     Assessment: Patient tolerated session well. Pt demonstrated good participation in OT treatment. Pt continues to benefit from skilled OT services to address remaining deficits and progress toward baseline level of independence and safety. Patient ended session seated in recliner with call bell and needs within reach, with chair/bed alarm activated, and with legs elevated.   Plan: Continue OT POC.     HELEN LAZARO, OT   2/14/2025

## 2025-02-14 NOTE — PLAN OF CARE
[facility]  Rehab Physician Plan of Care Review     Patient Name:  Javid Sharp          Expected LOS: 7-14 days  Rehabilitation IGC: Debility [R53.81]  Acute CVA (cerebrovascular accident) (HCC) [I63.9]      Primary Rehabilitation (etiologic) diagnosis: CVA     Medical/Functional Prognosis: Good     Anticipated functional outcomes/goals and interventions: Performs mobility and self care activities at the highest level of function for planned discharge setting. Advance to the least restrictive diet without signs or symptoms of aspiration and demonstrate communication skills at the highest level of function for planned discharge setting.     Patient's/family's anticipated outcomes/personal goals: to improve strength, endurance, and independence      Physical therapy functional outcome/goal:  Bed mobility  Supine to Sit: Minimal assistance, Partial/Moderate assistance  Scooting: Minimal assistance  Bed Mobility Comments: n/a--OOB to chair   Transfers  Sit to Stand: Contact guard assistance  Stand to Sit: Contact guard assistance  Bed to Chair: Contact guard assistance  Stand Pivot Transfers: Minimal Assistance  Squat Pivot Transfers: Minimal Assistance, Contact guard assistance   Ambulation  Surface: Level tile  Device: Single point cane, Parallel Bars  Other Apparatus: AFO  Assistance: Contact guard assistance, Minimal assistance  Quality of Gait: Blair to assist cordinated movements of LLE  Gait Deviations: Slow Jenni, Decreased step length, Decreased step height  Distance: 15 (ft) X 2  Comments: Ataxic gait pattern with scissoring and circumduction.  More Ambulation?: Yes   Ambulation 2  Surface - 2: level tile  Device 2: Single point cane (hurrycane)  Assistance 2: Minimal assistance  Quality of Gait 2: intermittent toe drag, circumduction on R; long strides with over reaching with L arm with cane placement; veers to L at times; knee hyperextension in stance on R  Distance: 200ft  Comments: additional 200ft

## 2025-02-14 NOTE — PROGRESS NOTES
[facility]  SPEECH LANGUAGE PATHOLOGY: COMMUNICATION Initial Assessment    MRN: [id number]    ADMISSION DATE: 2/12/2025  ADMITTING DIAGNOSIS: Debility [R53.81]  Acute CVA (cerebrovascular accident) ([facility]) [I63.9]  PRIMARY DIAGNOSIS: Acute CVA (cerebrovascular accident) ([facility])  ICD-10: Treatment Diagnosis:  R41.841 Cognitive-Communication Deficit  R47.1 Dysarthria and Anarthria    RECOMMENDATIONS:   Recommendations: cognitive communication strategy training and dysarthria management  -diet tolerance monitoring     Patient continues to require skilled intervention:   Recommend speech therapy services during acute inpatient rehab stay and at next level of care       ASSESSMENT   patient presents with mild dysarthria and complaints of mild higher level cognitive linguistic deficits. based on SLP discharge notes s/p his prior CVA in 2017, pt speech slightly worse at this time;however, with min cues he can participate in conversation functionally. while he scores within normal limits on cognitive evaluation, patient's complaints are more related to functional attention and processing. he endorses \"slow thinking\" and difficulty concentrating. he advocates well for himself by requesting communication partner speak more slowly and provide single steps or single chunks of information at a time. he is interested in speech therapy for further strategy training to maintain and promote functional abilities.        GENERAL   History of Present Injury/Illness: per chart, \"HPI: Javid Sharp is a 74 y.o. male patient who presented to [facility] on 2/10/2025 secondary to left-sided weakness.  Per H&P \"Javid Sharp is a 74 y.o. male with medical history of prior CVA with residual right deficits, type 2 diabetes, hypertension, depression who presented with left-sided weakness and dysarthria.  Patient with history of prior CVA with residual right weakness.  Family reported his symptoms are significantly out of proportion of

## 2025-02-14 NOTE — PROGRESS NOTES
PHYSICAL THERAPY DAILY NOTE      [id number]  [id number]               Total Treatment Time:  42 Minutes    Pt. Seen for: AM, Gait Training, Patient Education, and Therapeutic Exercise     Subjective: I have had this brace for about 7 years.           Objective:  Restrictions/Precautions: Fall Risk  Required Braces or Orthoses?: Yes           Right Lower Extremity Brace: Ankle Foot Orthotics  Other Position/Activity Restrictions: Custom plastic and metal hinged AFO with full foot plate. Good fit. Old dirty velcro straps. Dirty padding.  No longer offering dorsi assist. Needs screws need adjustment. Call into [facility] ([facility])         GROSS ASSESSMENT           COGNITION Daily Assessment    Overall Orientation Status: Within Normal Limits  Orientation Level: Oriented X4   Overall Cognitive Status: WFL  Arousal/Alertness: Appears intact  Following Commands: Follows all commands without difficulty  Attention Span: Appears intact  Memory: Appears intact  Safety Judgement: Appears intact  Problem Solving: Good awareness of errors made  Insights: Fully aware of deficits  Initiation: Requires cues for all  Sequencing: Requires cues for some        BED/MAT MOBILITY Daily Assessment     Bed Mobility Comments: n/a--OOB to chair        TRANSFERS Daily Assessment    Sit to Stand: Contact guard assistance  Stand to Sit: Contact guard assistance  Bed to Chair: Contact guard assistance  Stand Pivot Transfers: Minimal Assistance          GAIT Daily Assessment   Right UE with flexor tone.  Time taken to decrease tone in hand and elbow for improved relaxation to the right trunk for additionally decreasing hip hike during gait.  Able to extend finger to neurtral and elbow to almost full extension. Surface: Level tile  Device: Single point cane;Parallel Bars  Other Apparatus: AFO  Assistance: Minimal assistance  Quality of Gait: working on normal left step and right hip and knee release vs. hip hike.  difficult 2* to

## 2025-02-14 NOTE — PROGRESS NOTES
PHYSICAL THERAPY DAILY NOTE    PT Individual Minutes  Time In: 1436  Time Out: 1526  Minutes: 50                 Total Treatment Time:  50 Minutes    Pt. Seen for: PM, Gait Training, and Transfer Training     Subjective: I don't understand why I am getting so emotional          Objective:  Restrictions/Precautions: Fall Risk  Required Braces or Orthoses?: Yes           Right Lower Extremity Brace: Ankle Foot Orthotics  Other Position/Activity Restrictions: Custom plastic and metal hinged AFO with full foot plate. Good fit. Old dirty velcro straps. Dirty padding.  No longer offering dorsi assist. Needs screws need adjustment. Call into [facility] ([facility])         GROSS ASSESSMENT           COGNITION Daily Assessment    Overall Orientation Status: Within Normal Limits  Orientation Level: Oriented X4   Overall Cognitive Status: WFL  Arousal/Alertness: Appears intact  Following Commands: Follows all commands without difficulty  Attention Span: Appears intact  Memory: Appears intact  Safety Judgement: Appears intact  Problem Solving: Good awareness of errors made  Insights: Fully aware of deficits  Initiation: Requires cues for all  Sequencing: Requires cues for some        BED/MAT MOBILITY Daily Assessment     Bed Mobility Comments: n/a--OOB to chair        TRANSFERS Daily Assessment    Sit to Stand: Contact guard assistance  Stand to Sit: Contact guard assistance  Bed to Chair: Contact guard assistance  Stand Pivot Transfers: Minimal Assistance          GAIT Daily Assessment   Spoke with Alvaro Evans - orthotist at [facility].  They do not have privileges here to come and fix AFO.  Feels hinge and screws may not be optimum as the brace is 8 years old.  Feels pt may need a new brace.  Advised could try to turn the ant. Screws to see if that helped with dorsi assist.  This PT did that  -no better - maybe worse.  Returned to original position and tried the post screws - no

## 2025-02-14 NOTE — PROGRESS NOTES
[place] [address]  Tel: [phone]     Physical Medicine and Rehabilitation Progress Note      Javid Sharp  Admit Date: 2/12/2025  Admit Diagnosis: Debility [R53.81]  Acute CVA (cerebrovascular accident) (HCC) [I63.9]    Subjective     Patient seen this morning during break in therapy. Patient found ambulating solo from toilet; he states he called for help but waited several minutes and did not want to soil himself.  Patient aware to call for help when needed to use restroom.  BG improving (<200 x 3d), continue current regimen over weekend. All questions and concerns were addressed at this time.    Objective:     Current Facility-Administered Medications   Medication Dose Route Frequency    aspirin chewable tablet 81 mg  81 mg Oral Daily    atorvastatin (LIPITOR) tablet 80 mg  80 mg Oral Nightly    citalopram (CELEXA) tablet 20 mg  20 mg Oral Daily    enoxaparin (LOVENOX) injection 40 mg  40 mg SubCUTAneous Daily    Glucagon Emergency KIT 1 mg  1 mg SubCUTAneous PRN    glucose chewable tablet 16 g  4 tablet Oral PRN    insulin lispro (HUMALOG,ADMELOG) injection vial 0-4 Units  0-4 Units SubCUTAneous 4x Daily AC & HS    lisinopril (PRINIVIL;ZESTRIL) tablet 10 mg  10 mg Oral Daily    And    hydroCHLOROthiazide (HYDRODIURIL) tablet 12.5 mg  12.5 mg Oral Daily    mirtazapine (REMERON) tablet 15 mg  15 mg Oral Nightly    sodium chloride flush 0.9 % injection 5-40 mL  5-40 mL IntraVENous 2 times per day    sodium chloride flush 0.9 % injection 5-40 mL  5-40 mL IntraVENous PRN    glipiZIDE (GLUCOTROL) tablet 5 mg  5 mg Oral QAM AC    metFORMIN (GLUCOPHAGE) tablet 1,000 mg  1,000 mg Oral BID WC    acetaminophen (TYLENOL) tablet 650 mg  650 mg Oral Q4H PRN    polyethylene glycol (GLYCOLAX) packet 17 g  17 g Oral Daily PRN    sennosides-docusate sodium (SENOKOT-S) 8.6-50 MG tablet 1 tablet  1 tablet Oral QHS    sodium phosphate (FLEET) rectal enema 1  infection, such as elevated white blood cell count, increased temperature, signs of UTI.    -Pain: The patient will be monitored closely for signs and symptoms of pain. Pain regimen will be adjusted as needed.  -Cognitive function/mental status: Has the potential for residual deficits in orientation, processing, attention, thought organization, problem solving, reasoning, word retrieval, and memory given recent injury. Therefore, patient may require continued routine follow up primarily by Speech Language Pathology in addition to Occupational Therapy to address potential underlying deficits and working on higher level-cognitive function with compensatory strategies as indicated.  -Deep venous thromboembolism: patient is at increased risk for thromboembolic event given recent injury, new mobility limitations and current hospitalization. Therefore, maintain on mechanical DVT prophylaxis and encourage progressive out of mobility while continuing routine monitoring for potential thromboembolic events.  -Bowel management: increased potential for recurrent constipation given mobility limitations, current hospitalization, and medication use. Will need to continue routine monitoring of bowel function with appropriate adjustment in bowel regimen as indicated to ensure adequate bowel management.  Last BM (including prior to admit): 02/11/25  -Bladder management: Monitor urinary output and urinary function/dysfunction. Does have the potential for underlying bladder dysfunction given immobility. Therefore, requires continued routine monitoring with appropriate bladder management as indicated to avoid urinary tract complications.          Principal Problem:    Acute CVA (cerebrovascular accident) ([facility])  Active Problems:    CKD stage 3b, GFR 30-44 ml/min ([facility])    Essential hypertension    Dysarthria    Type 2 diabetes mellitus with hyperglycemia, without long-term current use of insulin ([facility])    Hemiplegia and hemiparesis

## 2025-02-15 LAB
GLUCOSE BLD STRIP.AUTO-MCNC: 135 MG/DL (ref 65–100)
GLUCOSE BLD STRIP.AUTO-MCNC: 142 MG/DL (ref 65–100)
GLUCOSE BLD STRIP.AUTO-MCNC: 151 MG/DL (ref 65–100)
GLUCOSE BLD STRIP.AUTO-MCNC: 86 MG/DL (ref 65–100)
SERVICE CMNT-IMP: ABNORMAL
SERVICE CMNT-IMP: NORMAL

## 2025-02-15 PROCEDURE — 6360000002 HC RX W HCPCS: Performed by: STUDENT IN AN ORGANIZED HEALTH CARE EDUCATION/TRAINING PROGRAM

## 2025-02-15 PROCEDURE — 97116 GAIT TRAINING THERAPY: CPT

## 2025-02-15 PROCEDURE — 97112 NEUROMUSCULAR REEDUCATION: CPT

## 2025-02-15 PROCEDURE — 97110 THERAPEUTIC EXERCISES: CPT

## 2025-02-15 PROCEDURE — 82962 GLUCOSE BLOOD TEST: CPT

## 2025-02-15 PROCEDURE — 6370000000 HC RX 637 (ALT 250 FOR IP): Performed by: STUDENT IN AN ORGANIZED HEALTH CARE EDUCATION/TRAINING PROGRAM

## 2025-02-15 PROCEDURE — 1180000000 HC REHAB R&B

## 2025-02-15 RX ADMIN — CITALOPRAM HYDROBROMIDE 20 MG: 20 TABLET ORAL at 09:20

## 2025-02-15 RX ADMIN — MIRTAZAPINE 15 MG: 15 TABLET, FILM COATED ORAL at 19:57

## 2025-02-15 RX ADMIN — ASPIRIN 81 MG: 81 TABLET, CHEWABLE ORAL at 09:20

## 2025-02-15 RX ADMIN — METFORMIN HYDROCHLORIDE 1000 MG: 500 TABLET ORAL at 09:19

## 2025-02-15 RX ADMIN — LISINOPRIL 10 MG: 5 TABLET ORAL at 09:20

## 2025-02-15 RX ADMIN — HYDROCHLOROTHIAZIDE 12.5 MG: 25 TABLET ORAL at 09:20

## 2025-02-15 RX ADMIN — CLOPIDOGREL BISULFATE 75 MG: 75 TABLET ORAL at 09:20

## 2025-02-15 RX ADMIN — ENOXAPARIN SODIUM 40 MG: 100 INJECTION SUBCUTANEOUS at 09:20

## 2025-02-15 RX ADMIN — METFORMIN HYDROCHLORIDE 1000 MG: 500 TABLET ORAL at 16:29

## 2025-02-15 RX ADMIN — GLIPIZIDE 5 MG: 5 TABLET ORAL at 09:20

## 2025-02-15 NOTE — PROGRESS NOTES
Occupational Therapy  [facility] OCCUPATIONAL THERAPY DAILY NOTE  OT Individual Minutes  Time In: 0922  Time Out: 1014  Minutes: 52               Subjective: Pt agreeable to treatment. \"I want to get better.\"  Pain: No pain expressed.  Interdisciplinary Communication: Collaborated with PT regarding pt performance and handoff communication.   Precautions: Falls and Posey Alarm  Lines:N/A  O2 Device: RA    FUNCTIONAL MOBILITY:       Sit to Stand CGA and Blair    Transfer  CGA and Blair  Transfer Type: SPT  Equipment: NA       - Cognition - Visual-Perceptual    Patient completed [place] Visual Spatial task where he completed task of matching shapes in correct location/position. He demonstrated 3 errors but able to self-correct with 1 verbal prompt.       - Neuro-Muscular Re-Education - Strengthening   Patient engaged in B UE ROM/strengthening to increase functional endurance for ADL's, IADL's and transfers. Pt completed 8 minutes on the ergometer frontwards (4 minutes) and backwards (4 minutes) with light resistance. Patient able to maintain grasp with R hand with 3 adjustments needed during activity.     Patient tolerated PROM stretching of RUE to assist with improved ROM, tone, and involvement for increased use and tolerance with ADLs and IADLs. Patient able to tolerate shoulder flexion, shoulder abduction, and wrist extension for up to 20-30 seconds at a time.     Provided patient with BUE ROM exercises including hands interlocking for elbow flexion/extension and shoulder flexion/extension. In addition, discussed placing arm/hand on tray table and completing wrist pronation/supination.      Education   Benefits of OT     Assessment: Patient was very cooperative and motivated during today's treatment session. He participated in UE strengthening, visual-cognitive, ROM, and NMRE. Pt demonstrated good participation in OT treatment. Therapist provided exercises to complete in his room to assist with BUE ROM. Pt continues to

## 2025-02-15 NOTE — PROGRESS NOTES
PHYSICAL THERAPY DAILY NOTE    PT Individual Minutes  Time In: 0830  Time Out: 0905  Minutes: 35                 Total Treatment Time:  35 Minutes    Pt. Seen for: AM, Balance Training, Gait Training, Therapeutic Exercise, and Transfer Training     Subjective: Patient is agreeable to physical therapy treatment today.  States no C/O pain or discomfort at this time. Medications taken approximately 2 hours ago per RN.  Last treatment was helpful.  No new complaints at this time.          Objective:  Restrictions/Precautions: Fall Risk  Required Braces or Orthoses?: Yes           Right Lower Extremity Brace: Ankle Foot Orthotics  Other Position/Activity Restrictions: Custom plastic and metal hinged AFO with full foot plate. Good fit. Old dirty velcro straps. Dirty padding.  No longer offering dorsi assist. Needs screws need adjustment. Call into [facility] ([facility])         GROSS ASSESSMENT   LLE strength moderately decreased functionally        COGNITION Daily Assessment   Additional time with responses Overall Orientation Status: Within Normal Limits  Orientation Level: Oriented X4   Overall Cognitive Status: WFL  Arousal/Alertness: Appears intact  Following Commands: Follows all commands without difficulty  Attention Span: Appears intact  Memory: Appears intact  Safety Judgement: Appears intact  Problem Solving: Good awareness of errors made  Insights: Fully aware of deficits  Initiation: Requires cues for some  Sequencing: Requires cues for some        BED/MAT MOBILITY Daily Assessment     Bed Mobility Comments: Patient up sitting in recliner by OT treatment        TRANSFERS Daily Assessment   Minor verbal cues needed. Sit to Stand: Contact guard assistance  Stand to Sit: Contact guard assistance  Stand Pivot Transfers: Contact guard assistance;Minimal Assistance          GAIT Daily Assessment   Gait pattern: Ataxic. Base of Support: Narrow.  Gait Pattern: Difficulty sequencing, improving  circumduction   Interventions: Safety awareness training; Tactile cues; Verbal cues       Surface: Level tile  Device: Single point cane  Other Apparatus: AFO  Assistance: Contact guard assistance;Minimal assistance  Quality of Gait: working on normal left step and right hip and knee release vs. hip hike.  difficult 2* to decreased dorsi assist with old brace but able with time and occ faciliagtion at right hip  Gait Deviations: Slow Jenni;Decreased step length;Decreased step height;Decreased arm swing  Distance: 75 (ft)  More Ambulation?: No              STEPS/STAIRS Daily Assessment   NT today                BALANCE Daily Assessment   Standing balance activities in normal stance position, eyes open/closed, 15 seconds each with CGA to Guilherme.   Static Sitting: Normal:  Pt. able to maintain balance w/o UE support  Dynamic Sitting: Fair - accepts minimal challenge;  can maintain balance while turning head/trunk  Static Standing: Fair:  Pt. requires UE support;  may need occasional min A  Dynamic Standing: Fair - accepts minimal challenge;  can maintain balance while turning head/trunk       WHEELCHAIR MOBILITY Daily Assessment   NT                       LOWER EXTREMITY EXERCISES   Patient performed seated lower extremity strengthening exercises: heel raises, LAQ, hip squeezes for adduction with red elastic band, hamstring curls with manual resistance, hip hiking. 1 set x 15 reps.  Motomed X 10 minutes level 3-4.       Pt. Left in wheelchair call bell in reach needs in reach bed/chair alarm activated         Assessment: Patient up sitting in recliner and alert upon entering room for treatment session. Patient education during treatment session consisted of explanations and demonstrations. The patient required CGA to complete sit to stand The patient ambulated 75 feet with CGA, and RW. Minor verbal cues were required to remind the patient proper walking management and sequencing. Gait deviations include right leaning,

## 2025-02-15 NOTE — PLAN OF CARE
Problem: Safety - Adult  Goal: Free from fall injury  Outcome: Progressing     Problem: Chronic Conditions and Co-morbidities  Goal: Patient's chronic conditions and co-morbidity symptoms are monitored and maintained or improved  Outcome: Progressing     Problem: Pain  Goal: Verbalizes/displays adequate comfort level or baseline comfort level  Outcome: Progressing     Problem: Skin/Tissue Integrity  Goal: Skin integrity remains intact  Description: 1.  Monitor for areas of redness and/or skin breakdown  2.  Assess vascular access sites hourly  3.  Every 4-6 hours minimum:  Change oxygen saturation probe site  4.  Every 4-6 hours:  If on nasal continuous positive airway pressure, respiratory therapy assess nares and determine need for appliance change or resting period  Outcome: Progressing

## 2025-02-16 VITALS
BODY MASS INDEX: 30.53 KG/M2 | SYSTOLIC BLOOD PRESSURE: 127 MMHG | HEIGHT: 66 IN | RESPIRATION RATE: 18 BRPM | DIASTOLIC BLOOD PRESSURE: 67 MMHG | WEIGHT: 190 LBS | HEART RATE: 78 BPM | OXYGEN SATURATION: 94 % | TEMPERATURE: 98.4 F

## 2025-02-16 LAB
GLUCOSE BLD STRIP.AUTO-MCNC: 103 MG/DL (ref 65–100)
GLUCOSE BLD STRIP.AUTO-MCNC: 110 MG/DL (ref 65–100)
GLUCOSE BLD STRIP.AUTO-MCNC: 112 MG/DL (ref 65–100)
GLUCOSE BLD STRIP.AUTO-MCNC: 97 MG/DL (ref 65–100)
SERVICE CMNT-IMP: ABNORMAL
SERVICE CMNT-IMP: NORMAL

## 2025-02-16 PROCEDURE — 6360000002 HC RX W HCPCS: Performed by: STUDENT IN AN ORGANIZED HEALTH CARE EDUCATION/TRAINING PROGRAM

## 2025-02-16 PROCEDURE — 1180000000 HC REHAB R&B

## 2025-02-16 PROCEDURE — 6370000000 HC RX 637 (ALT 250 FOR IP): Performed by: STUDENT IN AN ORGANIZED HEALTH CARE EDUCATION/TRAINING PROGRAM

## 2025-02-16 PROCEDURE — 82962 GLUCOSE BLOOD TEST: CPT

## 2025-02-16 RX ADMIN — ATORVASTATIN CALCIUM 80 MG: 80 TABLET, FILM COATED ORAL at 20:25

## 2025-02-16 RX ADMIN — CITALOPRAM HYDROBROMIDE 20 MG: 20 TABLET ORAL at 07:50

## 2025-02-16 RX ADMIN — METFORMIN HYDROCHLORIDE 1000 MG: 500 TABLET ORAL at 17:09

## 2025-02-16 RX ADMIN — ENOXAPARIN SODIUM 40 MG: 100 INJECTION SUBCUTANEOUS at 08:36

## 2025-02-16 RX ADMIN — METFORMIN HYDROCHLORIDE 1000 MG: 500 TABLET ORAL at 07:48

## 2025-02-16 RX ADMIN — CLOPIDOGREL BISULFATE 75 MG: 75 TABLET ORAL at 07:50

## 2025-02-16 RX ADMIN — GLIPIZIDE 5 MG: 5 TABLET ORAL at 07:49

## 2025-02-16 RX ADMIN — MIRTAZAPINE 15 MG: 15 TABLET, FILM COATED ORAL at 20:25

## 2025-02-16 RX ADMIN — ASPIRIN 81 MG: 81 TABLET, CHEWABLE ORAL at 07:49

## 2025-02-16 RX ADMIN — HYDROCHLOROTHIAZIDE 12.5 MG: 25 TABLET ORAL at 07:47

## 2025-02-16 RX ADMIN — LISINOPRIL 10 MG: 5 TABLET ORAL at 07:49

## 2025-02-16 ASSESSMENT — PAIN SCALES - GENERAL: PAINLEVEL_OUTOF10: 0

## 2025-02-17 LAB
GLUCOSE BLD STRIP.AUTO-MCNC: 109 MG/DL (ref 65–100)
GLUCOSE BLD STRIP.AUTO-MCNC: 129 MG/DL (ref 65–100)
GLUCOSE BLD STRIP.AUTO-MCNC: 129 MG/DL (ref 65–100)
GLUCOSE BLD STRIP.AUTO-MCNC: 84 MG/DL (ref 65–100)
SERVICE CMNT-IMP: ABNORMAL
SERVICE CMNT-IMP: NORMAL

## 2025-02-17 PROCEDURE — 6360000002 HC RX W HCPCS: Performed by: STUDENT IN AN ORGANIZED HEALTH CARE EDUCATION/TRAINING PROGRAM

## 2025-02-17 PROCEDURE — 97110 THERAPEUTIC EXERCISES: CPT

## 2025-02-17 PROCEDURE — 92507 TX SP LANG VOICE COMM INDIV: CPT

## 2025-02-17 PROCEDURE — 82962 GLUCOSE BLOOD TEST: CPT

## 2025-02-17 PROCEDURE — 97535 SELF CARE MNGMENT TRAINING: CPT

## 2025-02-17 PROCEDURE — 97116 GAIT TRAINING THERAPY: CPT

## 2025-02-17 PROCEDURE — 6370000000 HC RX 637 (ALT 250 FOR IP): Performed by: STUDENT IN AN ORGANIZED HEALTH CARE EDUCATION/TRAINING PROGRAM

## 2025-02-17 PROCEDURE — 97530 THERAPEUTIC ACTIVITIES: CPT

## 2025-02-17 PROCEDURE — 1180000000 HC REHAB R&B

## 2025-02-17 PROCEDURE — 97112 NEUROMUSCULAR REEDUCATION: CPT

## 2025-02-17 PROCEDURE — 99232 SBSQ HOSP IP/OBS MODERATE 35: CPT | Performed by: STUDENT IN AN ORGANIZED HEALTH CARE EDUCATION/TRAINING PROGRAM

## 2025-02-17 PROCEDURE — 92526 ORAL FUNCTION THERAPY: CPT

## 2025-02-17 RX ORDER — GLIPIZIDE 5 MG/1
2.5 TABLET ORAL
Status: DISCONTINUED | OUTPATIENT
Start: 2025-02-18 | End: 2025-02-25 | Stop reason: HOSPADM

## 2025-02-17 RX ADMIN — METFORMIN HYDROCHLORIDE 1000 MG: 500 TABLET ORAL at 16:42

## 2025-02-17 RX ADMIN — LISINOPRIL 10 MG: 5 TABLET ORAL at 08:09

## 2025-02-17 RX ADMIN — ENOXAPARIN SODIUM 40 MG: 100 INJECTION SUBCUTANEOUS at 08:08

## 2025-02-17 RX ADMIN — GLIPIZIDE 5 MG: 5 TABLET ORAL at 08:10

## 2025-02-17 RX ADMIN — HYDROCHLOROTHIAZIDE 12.5 MG: 25 TABLET ORAL at 08:09

## 2025-02-17 RX ADMIN — ASPIRIN 81 MG: 81 TABLET, CHEWABLE ORAL at 08:09

## 2025-02-17 RX ADMIN — CLOPIDOGREL BISULFATE 75 MG: 75 TABLET ORAL at 08:09

## 2025-02-17 RX ADMIN — ATORVASTATIN CALCIUM 80 MG: 80 TABLET, FILM COATED ORAL at 22:05

## 2025-02-17 RX ADMIN — MIRTAZAPINE 15 MG: 15 TABLET, FILM COATED ORAL at 22:05

## 2025-02-17 RX ADMIN — METFORMIN HYDROCHLORIDE 1000 MG: 500 TABLET ORAL at 08:09

## 2025-02-17 RX ADMIN — CITALOPRAM HYDROBROMIDE 20 MG: 20 TABLET ORAL at 08:09

## 2025-02-17 ASSESSMENT — PAIN SCALES - GENERAL
PAINLEVEL_OUTOF10: 0
PAINLEVEL_OUTOF10: 0

## 2025-02-17 NOTE — PROGRESS NOTES
PHYSICAL THERAPY DAILY NOTE    PT Individual Minutes  Time In: 1425  Time Out: 1534  Minutes: 69                 Total Treatment Time:  69 Minutes    Pt. Seen for: PM, Gait Training, Therapeutic Exercise, and Transfer Training     Subjective: \"I've been lifting me leg for so long, it's going to be hard to break the habit.\"         Objective:  Restrictions/Precautions: Fall Risk  Required Braces or Orthoses?: Yes           Right Lower Extremity Brace: Ankle Foot Orthotics  Other Position/Activity Restrictions: Custom plastic and metal hinged AFO with full foot plate. Good fit. Old dirty velcro straps. Dirty padding.  No longer offering dorsi assist. Needs screws need adjustment. Call into [facility] ([place])         GROSS ASSESSMENT   Pt. Up in recliner upon arrival.        COGNITION Daily Assessment        Overall Cognitive Status: WFL        BED/MAT MOBILITY Daily Assessment    NT       TRANSFERS Daily Assessment    Sit to Stand: Contact guard assistance  Stand to Sit: Contact guard assistance  Bed to Chair: Contact guard assistance (w/ SPC & AFO)          GAIT Daily Assessment   Worked on breaking down components of gait, focusing in isolating musculature to strength R LE as well as to improve gait mechanics.  Pt. Performed the following using vasquez rail on the L:  Staggered stance R LE heel raises w/ knee bend  Staggered stance, sliding R LE through swing without utilizing hip hiking  Sliding step followed by R LE heel raise/knee bend to simulate terminal stance/pre-swing   Surface: Level tile  Device: Vasquez rail  Other Apparatus: AFO  Assistance: Contact guard assistance  Quality of Gait: focusing on improving R hip/knee extension in stance, initiating plantarflexion & knee flexion in pre-swing, & utilizing hip flexors over hip hiking to pull R LE through swing phase  Gait Deviations: Slow Jenni;Decreased step length;Decreased step height;Decreased arm swing  Distance: 12'

## 2025-02-17 NOTE — PROGRESS NOTES
[facility] OCCUPATIONAL THERAPY DAILY NOTE  OT Individual Minutes  Time In: 1115  Time Out: 1201  Minutes: 46               Subjective: Pt agreeable to treatment. \"Why can't I take myself to the bathroom?\"  Pain: No pain expressed.  Interdisciplinary Communication: Collaborated with PT, RN, and SLP regarding pt status, pt performance, and handoff communication.   Precautions: Falls, Poor Safety Awareness, Arroyo Alarm, and R Hemiplegia   Lines:N/A  O2 Device: RA    FUNCTIONAL MOBILITY:       Bed Mobility NT    Sit to Stand CGA    Transfer  CGA  Transfer Type: SPT  Equipment:  hurrycane and HHA    Ambulation NT  Equipment: NA       - Activity Tolerance - Balance - Cognition - Coordination - Energy Conservation/Pacing  - Range of Motion - Strengthening   Pt completed therapeutic activities to challenge  LUE AROM/STR, L hand(s) dexterity/pinch, FM/GM coordination, in-hand translation, core stability, static balance, dynamic balance, standing tolerance, activity tolerance, problem solving, and Sequencing in preparation for safe return to max (I) with I/ADLs. Pt tolerated Pt activities graded up. Pt tolerated increase and exercises well with no c/o pain.. Rest breaks utilized as needed throughout..   Pt completed small geometric shape [#1] activity seated at table. Pt retrieved yellow geometric shape from board on L using LUE and placed into insert on board placed at R on 70 deg incline with LUE. Pt completed 100% with increased time and 1-3 verbal cues for directions, overall improved error recognition and self-correction. 1-3 drops noted throughout.  Pt completed peg and board activity standing at static standing frame with gaitbelt/CGA-SBA. After discussion with PT, 2\" step placed under L foot to facilitate improved WB throughout RLE. Pt used LUE to retrieve pegs from tray on L. Pt then used LUE to separate stacked [2] pegs. Once  pt used LUE to place pegs into green board placed at R of midline of pt as instructed

## 2025-02-17 NOTE — PLAN OF CARE
Problem: Safety - Adult  Goal: Free from fall injury  2/17/2025 0757 by Michelle Burns RN  Outcome: Progressing  2/16/2025 2140 by Clara Hurtado RN  Outcome: Progressing     Problem: Chronic Conditions and Co-morbidities  Goal: Patient's chronic conditions and co-morbidity symptoms are monitored and maintained or improved  2/17/2025 0757 by Michelle Burns RN  Outcome: Progressing  2/16/2025 2140 by Clara Hurtado RN  Outcome: Progressing  Flowsheets (Taken 2/16/2025 1945)  Care Plan - Patient's Chronic Conditions and Co-Morbidity Symptoms are Monitored and Maintained or Improved: Monitor and assess patient's chronic conditions and comorbid symptoms for stability, deterioration, or improvement     Problem: Pain  Goal: Verbalizes/displays adequate comfort level or baseline comfort level  2/17/2025 0757 by Michelle Burns RN  Outcome: Progressing  2/16/2025 2140 by Clara Hurtado RN  Outcome: Progressing     Problem: Skin/Tissue Integrity  Goal: Skin integrity remains intact  Description: 1.  Monitor for areas of redness and/or skin breakdown  2.  Assess vascular access sites hourly  3.  Every 4-6 hours minimum:  Change oxygen saturation probe site  4.  Every 4-6 hours:  If on nasal continuous positive airway pressure, respiratory therapy assess nares and determine need for appliance change or resting period  2/17/2025 0757 by Michelle Burns, RN  Outcome: Progressing  2/16/2025 2140 by Clara Hurtado RN  Outcome: Progressing  Flowsheets (Taken 2/16/2025 1945)  Skin Integrity Remains Intact: Monitor for areas of redness and/or skin breakdown

## 2025-02-17 NOTE — PROGRESS NOTES
[place] [address]  Tel: [phone]     Physical Medicine and Rehabilitation Progress Note      Javid Sharp  Admit Date: 2/12/2025  Admit Diagnosis: Debility [R53.81]  Acute CVA (cerebrovascular accident) (HCC) [I63.9]    Subjective     Patient seen today during break in therapy. We reviewed importance of calling for staff assistance to mobilize after patient confessed to ambulating solo from toilet again over weekend. BG improving with pre-supper checks <100 x 3d; will decrease glipizide to 2.5mg QAM. All questions and concerns were addressed at this time.    Objective:     Current Facility-Administered Medications   Medication Dose Route Frequency    [START ON 2/18/2025] glipiZIDE (GLUCOTROL) tablet 2.5 mg  2.5 mg Oral QAM AC    aspirin chewable tablet 81 mg  81 mg Oral Daily    atorvastatin (LIPITOR) tablet 80 mg  80 mg Oral Nightly    citalopram (CELEXA) tablet 20 mg  20 mg Oral Daily    enoxaparin (LOVENOX) injection 40 mg  40 mg SubCUTAneous Daily    Glucagon Emergency KIT 1 mg  1 mg SubCUTAneous PRN    glucose chewable tablet 16 g  4 tablet Oral PRN    insulin lispro (HUMALOG,ADMELOG) injection vial 0-4 Units  0-4 Units SubCUTAneous 4x Daily AC & HS    lisinopril (PRINIVIL;ZESTRIL) tablet 10 mg  10 mg Oral Daily    And    hydroCHLOROthiazide (HYDRODIURIL) tablet 12.5 mg  12.5 mg Oral Daily    mirtazapine (REMERON) tablet 15 mg  15 mg Oral Nightly    sodium chloride flush 0.9 % injection 5-40 mL  5-40 mL IntraVENous 2 times per day    sodium chloride flush 0.9 % injection 5-40 mL  5-40 mL IntraVENous PRN    metFORMIN (GLUCOPHAGE) tablet 1,000 mg  1,000 mg Oral BID WC    acetaminophen (TYLENOL) tablet 650 mg  650 mg Oral Q4H PRN    polyethylene glycol (GLYCOLAX) packet 17 g  17 g Oral Daily PRN    sennosides-docusate sodium (SENOKOT-S) 8.6-50 MG tablet 1 tablet  1 tablet Oral QHS    sodium phosphate (FLEET) rectal enema 1 enema  1 enema  needed.                 Will require continued close monitoring of the following systems:  -CV: Monitor blood pressure and heart rate. Adjust medications as needed. History of hypertension  -Endocrine: Monitor glycemia and adjust pharmacologic regimen as needed.  -Respiratory: Respiratory therapy consult. Incentive spirometer every 2 hours while awake, as needed. To be followed by respiratory therapist as needed.  -Sleep: Monitor sleep cycles.   -Skin: Monitor incisions/wounds for adequate healing. Wound care coordinator consulted as needed. Frequent turning while in bed and repositioning in chair. Monitor for skin breakdown or erythema.  -Psychiatric: Monitor for signs and symptoms of depression. Monitor appetite. Monitor for depression anxiety as the patient is adjusting to disability. Monitor and adjust medications as needed.  -Nutrition: Continue current diet and adjust as needed and as tolerated.  Consult dietitian for nutritional assessment and recommendations.  -ID: Patient will be monitored closely for any signs or symptoms of infection, such as elevated white blood cell count, increased temperature, signs of UTI.    -Pain: The patient will be monitored closely for signs and symptoms of pain. Pain regimen will be adjusted as needed.  -Cognitive function/mental status: Has the potential for residual deficits in orientation, processing, attention, thought organization, problem solving, reasoning, word retrieval, and memory given recent injury. Therefore, patient may require continued routine follow up primarily by Speech Language Pathology in addition to Occupational Therapy to address potential underlying deficits and working on higher level-cognitive function with compensatory strategies as indicated.  -Deep venous thromboembolism: patient is at increased risk for thromboembolic event given recent injury, new mobility limitations and current hospitalization. Therefore, maintain on mechanical DVT prophylaxis

## 2025-02-17 NOTE — PROGRESS NOTES
PHYSICAL THERAPY DAILY NOTE    PT Individual Minutes  Time In: 1040  Time Out: 1120  Minutes: 40                 Total Treatment Time:  40 Minutes    Pt. Seen for: AM, Gait Training, and Transfer Training     Subjective: \"Someone said my daughter can walk me in the halls.\"         Objective:  Restrictions/Precautions: Fall Risk  Required Braces or Orthoses?: Yes           Right Lower Extremity Brace: Ankle Foot Orthotics  Other Position/Activity Restrictions: Custom plastic and metal hinged AFO with full foot plate. Good fit. Old dirty velcro straps. Dirty padding.  No longer offering dorsi assist. Needs screws need adjustment. Call into [facility] ([place])         GROSS ASSESSMENT   Pt. Up in recliner upon arrival.        COGNITION Daily Assessment        Overall Cognitive Status: WFL        BED/MAT MOBILITY Daily Assessment    NT       TRANSFERS Daily Assessment   Toilet transfer supervision using grab bar.    Worked on repeated sit to/from stand from elevated mat @ descending surfaces w/o UEs& w/ L LE on 2\" step stool to increase WB thought R LE to provide closed chain strengthening.  Also focused on increased the anterior lean during the sit to/from stand to improve mechanics & efficiency Sit to Stand: Contact guard assistance  Stand to Sit: Contact guard assistance  Bed to Chair: Contact guard assistance (w/ SPC)          GAIT Daily Assessment   Pt. Exhibits hip hiking R LE to clear during swing w/ decreased utilization of hip/knee flexors due to extensor tone Surface: Level tile  Device: Single point cane  Other Apparatus: AFO  Assistance: Contact guard assistance;Minimal assistance  Gait Deviations: Slow Jenni;Decreased step length;Decreased step height;Decreased arm swing  Distance: 20'x2              STEPS/STAIRS Daily Assessment     NT         BALANCE Daily Assessment    Static Sitting: Good:  Pt. able to maintain balance w/o UE support;  exhibits some postural sway  Dynamic

## 2025-02-17 NOTE — PROGRESS NOTES
[facility] OCCUPATIONAL THERAPY DAILY NOTE  OT Individual Minutes  Time In: 0834  Time Out: 0922  Minutes: 48               Subjective: Pt agreeable to treatment. \"I'm ready\"  Pain: No pain expressed.  Interdisciplinary Communication: Collaborated with PA, PT, RN, and SLP regarding pt status, pt performance, and handoff communication.   Precautions: Falls, Poor Safety Awareness, Talisha Alarm, R Hemiplegia , and Cognitive Impairment  Lines:N/A  O2 Device: RA    FUNCTIONAL MOBILITY:       Bed Mobility CGA    Sit to Stand CGA    Transfer  CGA and Blair  Transfer Type: SPT  Equipment: Grab Bars and HHA    Ambulation NT  Equipment: NA      ACTIVITIES OF DAILY LIVING:       Eating   Sup-V - S/U seated in recliner   Oral Hygiene Supervision or touching assistance Sup-V oral care seated in recliner at tray table   Shower/Bathe Partial/moderate assistance CGA/Min A UB/LB bathing seated and standing at TTB with grab bars and HHSH, min incr time, 1-3 verbal cues for grab bar use and directions   Upper Body  Dressing Supervision or touching assistance CGA-SBA doff/don shirt seated, verbal/tactile cues for safety, overall good carry-over of compensatory methods from prior CVA/therpaies   Lower Body Dressing Partial/moderate assistance Mod A doff/don underweat and pants seated and standing, improved mobility of RLE for LB/footwear, cont difficulties, AE/AD edu/training PRN   Donning/Slater Footwear Partial/moderate assistance Min A doff/don socks, AFO and shoes, imrpved R foot to L knee for socks doff/don, able to don/strap top strap R AFO, forgot lower 2 straps, assist for lower 2 straps of AFO and don/strap R shoe (velcro)   Toileting Hygiene   Mod-Min A   Toilet Transfer   Mod-Min A SPT with HHA/incr support at sub axillary region   Education Adaptive ADL Techniques, AE/DME Training, Benefits of OT, Energy Conservation, Pacing, Functional Transfer Training, Aaron Dressing Strategies, Precautions, Rolling Walker Management, Safety

## 2025-02-17 NOTE — PLAN OF CARE
Problem: Safety - Adult  Goal: Free from fall injury  Outcome: Progressing     Problem: Chronic Conditions and Co-morbidities  Goal: Patient's chronic conditions and co-morbidity symptoms are monitored and maintained or improved  Outcome: Progressing  Flowsheets (Taken 2/16/2025 1945)  Care Plan - Patient's Chronic Conditions and Co-Morbidity Symptoms are Monitored and Maintained or Improved: Monitor and assess patient's chronic conditions and comorbid symptoms for stability, deterioration, or improvement     Problem: Pain  Goal: Verbalizes/displays adequate comfort level or baseline comfort level  Outcome: Progressing     Problem: Skin/Tissue Integrity  Goal: Skin integrity remains intact  Description: 1.  Monitor for areas of redness and/or skin breakdown  2.  Assess vascular access sites hourly  3.  Every 4-6 hours minimum:  Change oxygen saturation probe site  4.  Every 4-6 hours:  If on nasal continuous positive airway pressure, respiratory therapy assess nares and determine need for appliance change or resting period  Outcome: Progressing  Flowsheets (Taken 2/16/2025 1945)  Skin Integrity Remains Intact: Monitor for areas of redness and/or skin breakdown

## 2025-02-17 NOTE — PROGRESS NOTES
consistencies. Tolerating thin liquids by cup/straw.       Reviewed dysarthria strategies-\"Slop\" (slow, loud, over articulate, pause)  Provided written as well.     Reviewed cognitive communication strategies for attention. Applied to different scenarios. Written list of strategies provided.      PLAN    Duration/Frequency: Continue to follow patient 1-3 x/week for duration of inpatient rehabilitation stay to address goals listed or until goals met.     GOALS:   LTG: Patient will report improved cognitive functioning as measured by self report/Neuro QOL Scale- cognitive function (baseline raw score: 21/40 with higher scores indicating better self reported outcome)  LTG: Patient will demonstrate understanding and utilize compensatory strategies for speech intelligibility to promote communication across environments as evidenced by KYRA noms score of 6      STG: Patient will verbalize understanding of applicable compensatory strategies, and suggested techniques to facilitate functional performance and promote independence via teach back with 90%accuracy  STG: Patient will report at least one situation in which trained strategies/supports were utilized outside of therapy to improve cognitive linguistic function or participation in daily activities each therapy session   STG: Patient will report at least one situation in which trained strategies were utilized outside of therapy to improve speech function each therapy session   STG: patient will complete functional problem solving tasks using strategies for attention/concentration with occasional min A        LTG: Patient will tolerate safest, least restrictive oral diet without signs or symptoms of airway compromise. MET 2/17/2025  STG: Patient will consume regular textures and thin liquids without overt signs or symptoms of aspiration. MET 2/17/2025    Medical Necessity/Other Comments:   Patient is expected to demonstrate progress in cognitive ability and speech

## 2025-02-18 LAB
GLUCOSE BLD STRIP.AUTO-MCNC: 116 MG/DL (ref 65–100)
GLUCOSE BLD STRIP.AUTO-MCNC: 129 MG/DL (ref 65–100)
GLUCOSE BLD STRIP.AUTO-MCNC: 132 MG/DL (ref 65–100)
GLUCOSE BLD STRIP.AUTO-MCNC: 98 MG/DL (ref 65–100)
SERVICE CMNT-IMP: ABNORMAL
SERVICE CMNT-IMP: NORMAL

## 2025-02-18 PROCEDURE — 1180000000 HC REHAB R&B

## 2025-02-18 PROCEDURE — 97530 THERAPEUTIC ACTIVITIES: CPT

## 2025-02-18 PROCEDURE — 6360000002 HC RX W HCPCS: Performed by: STUDENT IN AN ORGANIZED HEALTH CARE EDUCATION/TRAINING PROGRAM

## 2025-02-18 PROCEDURE — 97112 NEUROMUSCULAR REEDUCATION: CPT

## 2025-02-18 PROCEDURE — 82962 GLUCOSE BLOOD TEST: CPT

## 2025-02-18 PROCEDURE — 97763 ORTHC/PROSTC MGMT SBSQ ENC: CPT

## 2025-02-18 PROCEDURE — 97110 THERAPEUTIC EXERCISES: CPT

## 2025-02-18 PROCEDURE — 6370000000 HC RX 637 (ALT 250 FOR IP): Performed by: STUDENT IN AN ORGANIZED HEALTH CARE EDUCATION/TRAINING PROGRAM

## 2025-02-18 PROCEDURE — 97116 GAIT TRAINING THERAPY: CPT

## 2025-02-18 PROCEDURE — 99233 SBSQ HOSP IP/OBS HIGH 50: CPT | Performed by: STUDENT IN AN ORGANIZED HEALTH CARE EDUCATION/TRAINING PROGRAM

## 2025-02-18 PROCEDURE — 92507 TX SP LANG VOICE COMM INDIV: CPT

## 2025-02-18 RX ORDER — SENNA AND DOCUSATE SODIUM 50; 8.6 MG/1; MG/1
2 TABLET, FILM COATED ORAL DAILY PRN
Status: DISCONTINUED | OUTPATIENT
Start: 2025-02-18 | End: 2025-02-25 | Stop reason: HOSPADM

## 2025-02-18 RX ADMIN — METFORMIN HYDROCHLORIDE 1000 MG: 500 TABLET ORAL at 07:40

## 2025-02-18 RX ADMIN — METFORMIN HYDROCHLORIDE 1000 MG: 500 TABLET ORAL at 16:26

## 2025-02-18 RX ADMIN — ENOXAPARIN SODIUM 40 MG: 100 INJECTION SUBCUTANEOUS at 07:42

## 2025-02-18 RX ADMIN — ASPIRIN 81 MG: 81 TABLET, CHEWABLE ORAL at 07:39

## 2025-02-18 RX ADMIN — CLOPIDOGREL BISULFATE 75 MG: 75 TABLET ORAL at 07:39

## 2025-02-18 RX ADMIN — HYDROCHLOROTHIAZIDE 12.5 MG: 25 TABLET ORAL at 07:39

## 2025-02-18 RX ADMIN — LISINOPRIL 10 MG: 5 TABLET ORAL at 07:40

## 2025-02-18 RX ADMIN — GLIPIZIDE 2.5 MG: 5 TABLET ORAL at 07:39

## 2025-02-18 RX ADMIN — MIRTAZAPINE 15 MG: 15 TABLET, FILM COATED ORAL at 22:00

## 2025-02-18 RX ADMIN — CITALOPRAM HYDROBROMIDE 20 MG: 20 TABLET ORAL at 07:40

## 2025-02-18 RX ADMIN — ATORVASTATIN CALCIUM 80 MG: 80 TABLET, FILM COATED ORAL at 22:00

## 2025-02-18 ASSESSMENT — PAIN SCALES - GENERAL: PAINLEVEL_OUTOF10: 0

## 2025-02-18 NOTE — PROGRESS NOTES
PHYSICAL THERAPY DAILY NOTE    PT Individual Minutes  Time In: 1123  Time Out: 1215  Minutes: 52                 Total Treatment Time:  52 Minutes    Pt seen for: Balance Training, Gait Training, Patient Education, Therapeutic Exercise, and Transfer Training     Subjective: It is not going to change overnight         Objective:  Restrictions/Precautions: Fall Risk  Required Braces or Orthoses?: Yes           Right Lower Extremity Brace: Ankle Foot Orthotics  Other Position/Activity Restrictions: Custom plastic and metal hinged AFO with full foot plate. Good fit. Old dirty velcro straps. Dirty padding.  No longer offering dorsi assist. Needs screws need adjustment. Call into [facility] ([place])         GROSS ASSESSMENT   Text message to Jeff at [facility] for need for new AFO        COGNITION Daily Assessment    Overall Orientation Status: Within Normal Limits   Overall Cognitive Status: WFL  Arousal/Alertness: Appears intact  Following Commands: Follows multistep commands with repitition, Follows multistep commands with increased time  Attention Span: Appears intact  Memory: Appears intact  Safety Judgement: Appears intact  Problem Solving: Assistance required to generate solutions  Insights: Fully aware of deficits  Initiation: Requires cues for some  Sequencing: Requires cues for some        BED/MAT MOBILITY Daily Assessment     Bed Mobility Comments: n/a--OOB to chair        TRANSFERS Daily Assessment    Sit to Stand: Contact guard assistance;Stand by assistance  Stand to Sit: Contact guard assistance  Stand Pivot Transfers: Contact guard assistance;Minimal Assistance          GAIT Daily Assessment    Surface: Level tile  Device: Purdy rail  Other Apparatus: AFO  Assistance: Contact guard assistance  Quality of Gait: amb forward and sidestep to L with rail several times, cues for hip flex on R, wt shift, upright posture; performed with ace wrap for DF assist and added post op shoe to L foot

## 2025-02-18 NOTE — PROGRESS NOTES
[place] [address]  Tel: [phone]     Physical Medicine and Rehabilitation Progress Note      Javid Sharp  Admit Date: 2/12/2025  Admit Diagnosis: Debility [R53.81]  Acute CVA (cerebrovascular accident) (HCC) [I63.9]    Subjective     Patient seen today during break in therapy and again during team conference, daughter present. Patient reports loose BM since starting metformin and requests scheduled doc-senna be changed to PRN. Flexor synergy noted in right knee and right hand/digits. All questions and concerns were addressed at this time.    Team conference held and attended today.    Objective:     Current Facility-Administered Medications   Medication Dose Route Frequency    sennosides-docusate sodium (SENOKOT-S) 8.6-50 MG tablet 2 tablet  2 tablet Oral Daily PRN    glipiZIDE (GLUCOTROL) tablet 2.5 mg  2.5 mg Oral QAM AC    aspirin chewable tablet 81 mg  81 mg Oral Daily    atorvastatin (LIPITOR) tablet 80 mg  80 mg Oral Nightly    citalopram (CELEXA) tablet 20 mg  20 mg Oral Daily    enoxaparin (LOVENOX) injection 40 mg  40 mg SubCUTAneous Daily    Glucagon Emergency KIT 1 mg  1 mg SubCUTAneous PRN    glucose chewable tablet 16 g  4 tablet Oral PRN    insulin lispro (HUMALOG,ADMELOG) injection vial 0-4 Units  0-4 Units SubCUTAneous 4x Daily AC & HS    lisinopril (PRINIVIL;ZESTRIL) tablet 10 mg  10 mg Oral Daily    And    hydroCHLOROthiazide (HYDRODIURIL) tablet 12.5 mg  12.5 mg Oral Daily    mirtazapine (REMERON) tablet 15 mg  15 mg Oral Nightly    sodium chloride flush 0.9 % injection 5-40 mL  5-40 mL IntraVENous PRN    metFORMIN (GLUCOPHAGE) tablet 1,000 mg  1,000 mg Oral BID WC    acetaminophen (TYLENOL) tablet 650 mg  650 mg Oral Q4H PRN    sodium phosphate (FLEET) rectal enema 1 enema  1 enema Rectal Daily PRN    bisacodyl (DULCOLAX) suppository 10 mg  10 mg Rectal Daily PRN    calcium carbonate (TUMS) chewable tablet 500 mg  500 mg  routine, adaptive techniques and deficit compensation strategies, strengthening and conditioning, equipment prescription and instructions in use.  to be assigned to patient's care to help facilitate all disposition needs and equipment requests provided by treating team members.  - Communication- Patient requires speech therapy for her ongoing communication disorder - Presently demonstrates some evidence of significant communication disorder including dysarthria. Therefore, requires continued follow up/skilled intervention by Speech Language Pathology as indicated.    - Cognition- Patient with possible decline in cognitive function from baseline. Patient has the potential for residual deficits in orientation, processing, attention, thought organization, problem solving, reasoning, word retrieval, and memory given recent acute CVA. Therefore, requires continued routine follow up primarily by Speech Language Pathology in addition to Occupational Therapy to address potential underlying deficits and working on higher level-cognitive function with compensatory strategies as indicated.  - RT-maintain O2 saturations greater than 92% during physical and endurance activities. Will consult RT if concerns with maintaining O2 saturations  - Diet: ADULT DIET; Regular; 4 carb choices (60 gm/meal); Low Fat/Low Chol/High Fiber/YOVANNY  - DME: TBD  - Family Training: TBD  - Dispo: Home with home health services  -Secondary risk modification include hypertension, hyperlipidemia, obesity, DM2  -Secondary stroke prevention: Plavix x 21 days (DC after 3/2), aspirin indefinitely  -LDL goal of less than 70. Current LDL: 60  -Blood pressure goal: 140/80  -Current HgbA1c: 10.2%  -Educated on current risk factors and modifications  -Neurology recommends Holter monitor at discharge. Will set up prior to d/c              Active medical conditions requiring ongoing and daily monitoring:  //Hypertension  -HCTZ and lisinopril

## 2025-02-18 NOTE — PROGRESS NOTES
[facility] OCCUPATIONAL THERAPY DAILY NOTE  OT Individual Minutes  Time In: 0750  Time Out: 0837  Minutes: 47               Subjective: Pt agreeable to treatment. \"Lets go to the gym\"  Pain: No pain expressed.  Interdisciplinary Communication: Collaborated with PT, RN, and SLP regarding pt status and pt performance.   Precautions: Falls, Poor Safety Awareness, [place] Alarm, and R Hemiplegia   Lines:N/A  O2 Device: RA    FUNCTIONAL MOBILITY:       Bed Mobility CGA    Sit to Stand CGA    Transfer  CGA  Transfer Type: SPT  Equipment:  HHA and bed rails    Ambulation NT  Equipment: NA       - Activity Tolerance - Coordination - Energy Conservation/Pacing  - Family Training - Neuro-Muscular Re-Education   Pt completed Neuro Re-Ed to challenge  R hand mobility, proximal RUE AROM and family/patient education/training  in preparation for safe return to max (I) with I/ADLs. Pt tolerated Pt tolerated activities well with no c/o pain. . Rest breaks utilized as needed throughout..   Pt completed gentle passive stretching and AAROM to RUE with and without R hand orthotic. Verbal and visual education provided to pt and dtr on method and technique for gentle stretch of R digits, wrist and elbow. Pt dtr palpated R forearm tendons under gentle stretch per pt report. Minor AAROM included with gentle passive stretch of R elbow, further education on importance of balancing stretching in extension with minor AROM flexion secondary to tendon/muscle shortening/contracture risk. Pt reports and dtr confirms since ost recent CVA min active extension of digits noted with trace to 0 palpable activation in forearm extensors. Improved with hand orthotic in place. Pt encouraged to build on and continue prior gentle stretching/movement of R hand during stay as well as active extension as long as no pain/increased muslce soreness presents/persists. Pt and dtr verbalized understanding and agreement. Will continue to address and review education in following

## 2025-02-18 NOTE — CARE COORDINATION
RN CM in room for bedside rounds. Patient sitting up in chair in room, alert,  daughter at bedside. Per MD, PT and family will need new AFO and Dr Carrasquillo is placing order. Patient is agreeable to [facility]. Will need HH RN, PT, OT, ST, HHA. Will get  list for patient and family, Tentative DC 2/25. RN CM to continue to follow.     [id number] Update-  list of choices left on bedside table

## 2025-02-18 NOTE — PROGRESS NOTES
[facility]  SPEECH LANGUAGE PATHOLOGY: COMMUNICATION Daily Note #2    MRN: [id number]    ADMISSION DATE: 2/12/2025  PRIMARY DIAGNOSIS: Acute CVA (cerebrovascular accident) (HCC)  Debility [R53.81]  Acute CVA (cerebrovascular accident) (HCC) [I63.9]    ICD-10: Treatment Diagnosis:  R41.841 Cognitive-Communication Deficit  R47.1 Dysarthria and Anarthria    RECOMMENDATIONS:   Recommendations:   -strategies for dysarthria and cognition     Patient continues to require skilled intervention: yes  Recommend speech therapy services during acute inpatient rehab stay and at next level of care       ASSESSMENT   patient demonstrates mild dysarthria and cognitive linguistic deficits. he demonstrates good understanding of strategies to help compensate. needs cues to carryover dysarthria strategies at conversation level.      GENERAL   Subjective: wife present for session. \"I like to learn something new every day\"      Pain:   Patient does not c/o pain                                               OBJECTIVE   Cognitive communication/dysarthria:  Patient recalled all dysarthria strategies and cognitive communication strategies independently.   Applied dysarthria strategies with appropriate self correction/self monitoring across automatic speech tasks.     Able to teach back by providing scenarios he applies cognitive strategies which he calls \"brain strategies\".   Wife at bedside reports speech is worse than baseline and agrees it sounds like he has an accent now.     Discussed word finding strategies (describe, synonym) as pt endorses frustration with occasional word finding difficulty at conversation level and losing train of thought.     PLAN    Duration/Frequency: Continue to follow patient 1-3 x/week for duration of inpatient rehab stay to address goals listed or until goals met.       GOALS:   LTG: Patient will report improved cognitive functioning as measured by self report/Neuro QOL Scale- cognitive function (baseline raw score:

## 2025-02-18 NOTE — DIABETES MGMT
Patient seen by diabetes educator.  Patient last seen by RN diabetes educator Reviewed blood glucose range over past 24 hours ().  Discussed ADA blood glucose and target A1C goals.  Educated regarding hypoglycemia signs, symptoms, and treatment. Reviewed effects of sweetened beverages on glycemic control and discussed alternative beverages to help improve glycemic control.  Discussed use of artificial sweeteners for coffee and/or tea.  Reviewed effects of carbohydrates on blood glucose, the \"plate method\" of healthy meal planning, basics of healthy meal plan, Consistent Carbohydrate Diet.  Reviewed current diabetes medication regimen: Glipizide 2.5 mg daily, Metformin 1000 mg BID, and Humalog correctional insulin. Educated patient regarding diabetic medications including mechanism of action, timing, and possible side effects. Patient verbalizes understanding of teaching.Encouraged compliance with discharge regimen. Encouraged patient to continue to work on lifestyle modifications and to follow up with primary care provider for further titration of regimen. Patient verbalized understanding and voices no further questions regarding diabetes management.

## 2025-02-18 NOTE — PROGRESS NOTES
[facility] OCCUPATIONAL THERAPY DAILY NOTE  OT Individual Minutes  Time In: 1300  Time Out: 1345  Minutes: 45               Subjective: Pt agreeable to treatment. \"I am trying to see how much it [R] can do\"  Pain: No pain expressed.  Interdisciplinary Communication: Collaborated with PT, RN, and SLP regarding pt status, pt performance, and handoff communication.   Precautions: Falls, Poor Safety Awareness, [place] Alarm, and R Hemiplegia   Lines:N/A  O2 Device: RA    FUNCTIONAL MOBILITY:       Bed Mobility NT    Sit to Stand CGA    Transfer  CGA  Transfer Type: SPT  Equipment:  HHA    Ambulation NT  Equipment: NA       - Activity Tolerance - Balance - Cognition - Coordination - Energy Conservation/Pacing  - Neuro-Muscular Re-Education - Range of Motion   Pt completed therapeutic activities to challenge  LUE AROM/STR, FM/GM coordination, FM coordination/dexterity, core stability, static balance, dynamic balance, standing tolerance, activity tolerance, complex problem solving, STM recall, Sequencing, AAROM, and GPROM in preparation for safe return to max (I) with I/ADLs. Pt tolerated Pt activities graded up. Pt tolerated increase and exercises well with no c/o pain.. Rest breaks utilized as needed throughout..   Pt completed mini peg and board activity standing at raised table with gait belt/CGA. Pt retrieved mini pegs from pile on midline and R using L hand. Pt placed pegs into board at midline according to pattern. Pt completed 1 mid and 1 high difficulty task(s). Pt required increased time with 3-5 verbal/tactile cues for posture. Pt stood ~15 minutes with increased seated rest break after.,     Anterior Glide; 0lb; 3x10, emphasis on maintaining box squared with pt Pt demonstrated improved RUE gentle PROM with riley and ability to maintain squared positioning.   Pt R orthotic strap reduced for improved fit/management by pt.   Gentle AAROM and GPROM to R shoulder, elbow, wrist and digits. Primarily focus on extension, minor

## 2025-02-18 NOTE — PROGRESS NOTES
PHYSICAL THERAPY DAILY NOTE    PT Individual Minutes  Time In: 1348  Time Out: 1435  Minutes: 47                 Total Treatment Time:  47 Minutes    Pt seen for: Balance Training, Gait Training, Patient Education, Therapeutic Exercise, and Transfer Training     Subjective: I want to improve so I am focused on what I need to do         Objective:  Restrictions/Precautions: Fall Risk  Required Braces or Orthoses?: Yes           Right Lower Extremity Brace: Ankle Foot Orthotics        GROSS ASSESSMENT   Text message to Jeff at [facility] for need for new AFO this am. No reply yet.     Pt up in w/c in gym. Transferred to mat for partial sit/stand with chair in front. Pt assisted with gripping and releasing R hand on armrest of chair. Wedge under R foot to encourage DF. Ace wrap for DF placed for ambulation and standing balance activities.        COGNITION Daily Assessment    Overall Orientation Status: Within Normal Limits   Overall Cognitive Status: WFL  Arousal/Alertness: Appears intact  Following Commands: Follows multistep commands with repitition, Follows multistep commands with increased time  Attention Span: Appears intact  Memory: Appears intact  Safety Judgement: Appears intact  Problem Solving: Assistance required to generate solutions  Insights: Fully aware of deficits  Initiation: Requires cues for some  Sequencing: Requires cues for some        BED/MAT MOBILITY Daily Assessment     Bed Mobility Comments: n/a--OOB to chair        TRANSFERS Daily Assessment    Sit to Stand: Contact guard assistance;Stand by assistance  Stand to Sit: Contact guard assistance  Stand Pivot Transfers: Contact guard assistance;Minimal Assistance          GAIT Daily Assessment   Noted that pt does not shift wt onto R LE during stance. Cues t/o to increase wt shift to R and stay upright for improved swing.  Surface: Level tile  Device: Single point cane  Other Apparatus: AFO  Assistance: Minimal assistance;Contact guard        BRENNA SMITH, PT  2/18/2025

## 2025-02-18 NOTE — CARE COORDINATION
CM reviewed / screen patient medical chart for continue stay.  Patient needs are continue to be followed by Dr. Thu Cortez.  Patient has no discharge date / plan at this time scheduled.   Patient will be discussed in Team Conference to discuss progress and barriers. CM will continue to follow / monitor for any needs, concerns or questions that may arise.

## 2025-02-18 NOTE — PATIENT CARE CONFERENCE
[facility] [place] [place]  INPATIENT REHABILITATION  TEAM CONFERENCE NOTE    Conference Date: 2025  Admit Date: 2025  9:54 AM  Patient Name: Javid Sharp    MRN: [id number]    : 1950 (74 y.o.)  Rehabilitation Admitting Diagnosis: Debility [R53.81]  Acute CVA (cerebrovascular accident) (HCC) [I63.9]           Team Members Present at Conference:  : Alessandra Krause CM  Nurse:Jessica Braxton RN  Occupational Therapist:Tiago Rizo, OTR/L  Physical Therapist: Aliya Sanchez, PT  Speech Therapist:  Pat MUÑIZ    Patient and daughter present for conference   ---------------------------------------------------------------------------------------------------    Case Management:  Patient's PLOF: Independent with IADLs, Independent with ADLs, and Independent with mobility - does not drive   Patient's Prior Living Situation: Lives alone  Baseline Supervision/Assistance: None  Current issues/needs regarding patient and family discharge status: needs more assistance and pt had been showing functional decline PTA with several falls over last 6 months    ---------------------------------------------------------------------------------------------------    Functional Assessment:  Most Recent Mobility Scores Per Physical Therapy:   Bed/Mat Mobility Bed Mobility Comments: Patient up sitting in recliner by OT treatment   Transfers Sit to Stand: Contact guard assistance  Stand to Sit: Contact guard assistance  Bed to Chair: Contact guard assistance (w/ SPC & AFO)  Stand Pivot Transfers: Contact guard assistance;Minimal Assistance    Gait Surface: Level tile  Device: Purdy rail  Other Apparatus: AFO  Assistance: Contact guard assistance  Quality of Gait: focusing on improving R hip/knee extension in stance, initiating plantarflexion & knee flexion in pre-swing, & utilizing hip flexors over hip hiking to pull R LE through swing phase  Gait Deviations: Slow Jenni;Decreased step

## 2025-02-19 LAB
GLUCOSE BLD STRIP.AUTO-MCNC: 123 MG/DL (ref 65–100)
GLUCOSE BLD STRIP.AUTO-MCNC: 93 MG/DL (ref 65–100)
GLUCOSE BLD STRIP.AUTO-MCNC: 96 MG/DL (ref 65–100)
GLUCOSE BLD STRIP.AUTO-MCNC: 98 MG/DL (ref 65–100)
SERVICE CMNT-IMP: ABNORMAL
SERVICE CMNT-IMP: NORMAL

## 2025-02-19 PROCEDURE — 6370000000 HC RX 637 (ALT 250 FOR IP): Performed by: STUDENT IN AN ORGANIZED HEALTH CARE EDUCATION/TRAINING PROGRAM

## 2025-02-19 PROCEDURE — 97535 SELF CARE MNGMENT TRAINING: CPT

## 2025-02-19 PROCEDURE — 82962 GLUCOSE BLOOD TEST: CPT

## 2025-02-19 PROCEDURE — 1180000000 HC REHAB R&B

## 2025-02-19 PROCEDURE — 97116 GAIT TRAINING THERAPY: CPT

## 2025-02-19 PROCEDURE — 97112 NEUROMUSCULAR REEDUCATION: CPT

## 2025-02-19 PROCEDURE — 6360000002 HC RX W HCPCS: Performed by: STUDENT IN AN ORGANIZED HEALTH CARE EDUCATION/TRAINING PROGRAM

## 2025-02-19 PROCEDURE — 97530 THERAPEUTIC ACTIVITIES: CPT

## 2025-02-19 RX ADMIN — METFORMIN HYDROCHLORIDE 1000 MG: 500 TABLET ORAL at 16:31

## 2025-02-19 RX ADMIN — CLOPIDOGREL BISULFATE 75 MG: 75 TABLET ORAL at 07:53

## 2025-02-19 RX ADMIN — ENOXAPARIN SODIUM 40 MG: 100 INJECTION SUBCUTANEOUS at 07:54

## 2025-02-19 RX ADMIN — GLIPIZIDE 2.5 MG: 5 TABLET ORAL at 07:53

## 2025-02-19 RX ADMIN — CITALOPRAM HYDROBROMIDE 20 MG: 20 TABLET ORAL at 07:53

## 2025-02-19 RX ADMIN — LISINOPRIL 10 MG: 5 TABLET ORAL at 07:52

## 2025-02-19 RX ADMIN — HYDROCHLOROTHIAZIDE 12.5 MG: 25 TABLET ORAL at 07:52

## 2025-02-19 RX ADMIN — METFORMIN HYDROCHLORIDE 1000 MG: 500 TABLET ORAL at 07:53

## 2025-02-19 RX ADMIN — ATORVASTATIN CALCIUM 80 MG: 80 TABLET, FILM COATED ORAL at 21:47

## 2025-02-19 RX ADMIN — ASPIRIN 81 MG: 81 TABLET, CHEWABLE ORAL at 07:53

## 2025-02-19 RX ADMIN — MIRTAZAPINE 15 MG: 15 TABLET, FILM COATED ORAL at 21:47

## 2025-02-19 ASSESSMENT — PAIN SCALES - GENERAL
PAINLEVEL_OUTOF10: 0
PAINLEVEL_OUTOF10: 0

## 2025-02-19 NOTE — PROGRESS NOTES
PHYSICAL WEEKLY PROGRESS NOTE      PT Individual Minutes  Time In: 0917  Time Out: 1004  Minutes: 47                     Total Treatment Time: 47 Minutes    Subjective: No new issues                Objective:     Precautions:      Restrictions/Precautions: Fall Risk  Required Braces or Orthoses?: Yes        Right Lower Extremity Brace: Ankle Foot Orthotics  Other Position/Activity Restrictions: [facility] to see pt in am    Vital Signs:/69   Pulse 77   Temp 98.6 °F (37 °C) (Oral)   Resp 18   Ht 1.676 m (5' 5.98\")   Wt 86.2 kg (190 lb)   SpO2 95%   BMI 30.68 kg/m²       Pain level: none  Pain location:   Pain interventions:      Patient education:   Education Given To: Patient  Education Provided: Precautions;Mobility Training;Transfer Training;Safety  Education Provided Comments: education on R hip/knee swing, R wt shift  Education Method: Verbal;Demonstration  Barriers to Learning: None  Education Outcome: Verbalized understanding;Demonstrated understanding;Continued education needed     Interdisciplinary Communication: Collaborated w/ OT and care team regarding pt's progress.       Cognition:   Overall Orientation Status: Within Normal Limits  Overall Cognitive Status: WNL  Arousal/Alertness: Appears intact  Following Commands: Follows multistep commands with repitition, Follows multistep commands with increased time  Attention Span: Appears intact  Memory: Appears intact  Safety Judgement: Appears intact  Problem Solving: Assistance required to generate solutions  Insights: Fully aware of deficits  Initiation: Requires cues for some  Sequencing: Requires cues for some    Lower Extremity Function:      LLE RLE   ROM WFL AROM dec due to weakness and tone   Fine Motor Coordination Intact Impaired:  RATT   Tone Normal Abnormal clonus, ext synergy, inc tone   Sensation NT NT   Strength Grossly WFL  Hip flex 2-/5, knee ext 3+/5, ankle 1/5            Functional Assessment:    Balance  Comments   Static

## 2025-02-19 NOTE — PROGRESS NOTES
PHYSICAL THERAPY DAILY NOTE    PT Individual Minutes  Time In: 1303  Time Out: 1348  Minutes: 45                 Total Treatment Time:  45 Minutes    Pt seen for: Balance Training, Gait Training, Patient Education, Therapeutic Exercise, and Transfer Training     Subjective: The (ace) really helps with my walking         Objective:  Restrictions/Precautions: Fall Risk  Required Braces or Orthoses?: Yes           Right Lower Extremity Brace: Ankle Foot Orthotics        GROSS ASSESSMENT   Pt up in recliner. Requested to use toilet. Pt amb quickly with increased stability but poor technique to toilet using SPC. Pt with loose BM. Stood to wash hand after at sink with sba. Transferred to w/c and transported to gym. Donned ace to R foot for DF assist.        COGNITION Daily Assessment    Overall Orientation Status: Within Normal Limits   Overall Cognitive Status: WNL  Arousal/Alertness: Appears intact  Following Commands: Follows multistep commands with repitition, Follows multistep commands with increased time  Attention Span: Appears intact  Memory: Appears intact  Safety Judgement: Appears intact  Problem Solving: Assistance required to generate solutions  Insights: Fully aware of deficits  Initiation: Requires cues for some  Sequencing: Requires cues for some        BED/MAT MOBILITY Daily Assessment     Bed Mobility Comments: n/a--OOB to chair        TRANSFERS Daily Assessment   To toilet, w/c    Pt continues to practice forward lean to rise from seat   Sit to Stand: Contact guard assistance  Stand to Sit: Stand by assistance  Bed to Chair: Contact guard assistance          GAIT Daily Assessment   10ft x 2 for toileting--cga with SPC Surface: Level tile  Device: Single point cane  Other Apparatus: AFO  Assistance: Minimal assistance  Quality of Gait: manual cues to R pelvis for wt shift in stance, assist with L shld to avoid trunk rotation and flexion, cues to straighten L knee in stance.  Distance: 200ft  Comments:  unsteady with slower pace and trying to focus on several changes to gait              STEPS/STAIRS Daily Assessment    Stairs?: Yes   # Steps : 8  Rails: Left ascending;Right ascending  Other Apparatus: AFO  Assistance: Minimal assistance  Comment: assist to clear R toes with going up and R heel with descending when stair climbing forward; sidestepping tech allowed pt to clear R foot but needed assist with placing R foot to descend.  Noted r knee hyperext with stairs          BALANCE Daily Assessment             WHEELCHAIR MOBILITY Daily Assessment                          LOWER EXTREMITY EXERCISES   LAQ with DF on R x 15  HS with towel on floor x 15--manual cues for hamstrings         Pain level: 0  Pain Location:    Pain Interventions:     Vital Signs:  /69   Pulse 77   Temp 98.6 °F (37 °C) (Oral)   Resp 18   Ht 1.676 m (5' 5.98\")   Wt 86.2 kg (190 lb)   SpO2 95%   BMI 30.68 kg/m²       Education:    Education Given To: Patient  Education Provided: Precautions;Mobility Training;Transfer Training;Safety  Education Provided Comments: education on R hip/knee swing, R wt shift  Education Method: Verbal;Demonstration  Barriers to Learning: None  Education Outcome: Verbalized understanding;Demonstrated understanding;Continued education needed     Interdisciplinary Communication: Collaborated w/ OT and care team regarding pt's progress.    Pt left in recliner call bell in reach needs in reach bed/chair alarm activated   Left message for Zulema esparza re: her concerns about d/c         Assessment: Pt demonstrates good effort at correction of gait. He is more unsteady with attempts to alter gait habits and will benefit ongoing therapy to improve. Pt has improved safety and stability with old technique.  Sidestepping technique with stairs appeared more safe at this time and allowed wt shift without performing abnormal movement patterns. Pt will benefit continued physical therapy in rehab setting to improve

## 2025-02-19 NOTE — PROGRESS NOTES
[facility] OCCUPATIONAL THERAPY PROGRESS NOTE  OT Individual Minutes  Time In: 0832  Time Out: 0917  Minutes: 45               GOALS:  Short Term Goals:  Time Frame for Short Term Goals: 7 days   STG 1: Patient will dress UB with [place] using AE/DME PRN. [Goal met 2/19/25]  STG 2: Patient will dress LB with [place] using AE/DME PRN. [Goal met 2/19/25]  STG 3: Patient will don footwear with [place] consistently using AE/DME PRN. [Goal met 2/19/25]  STG 4: Patient will bathe with [place] consistently using AE/DME PRN. [Goal met 2/19/25]  STG 5: Patient will toilet with [place] using AE/DME PRN. [Goal met 2/19/25]     Long Term Goals:  Time Frame for Long Term Goals: 21 days   LTG 1: Patient will dress UB with  Setup Assistance using AE/DME PRN. [Goal met, grade up 2/19/25]  LTG 2: Patient will dress LB with Supervision or Touching Assistance using AE/DME PRN. [Pt progressing 2/19/25]  LTG 3: Patient will don footwear with Supervision or Touching Assistance using AE/DME PRN. [Pt progressing 2/19/25]  LTG 4: Patient will bathe with Supervision or Touching Assistance using AE/DME PRN. [Pt progressing 2/19/25]  LTG 5: Patient will toilet with Supervision or Touching Assistance using AE/DME PRN. [Pt progressing 2/19/25]  LTG 6: Patient/caregiver will verbalize understanding of OT recommendations regarding ADLs, functional transfers, home safety, AE/DME, energy conservation, safety awareness, activity tolerance, and follow-up therapy to increase safety with functional tasks upon discharge.[Pt progressing 2/19/25]       Subjective: Patient agreeable to therapy. \"Feels good to dress yourself\"  Pain: No pain expressed.  Precautions: Falls, Poor Safety Awareness, [place] Alarm, and R Hemiplegia   Lines:N/A  O2 Device: RA      FUNCTIONAL MOBILITY:        Bed Mobility CGA    Sit to Stand SBA and CGA    Transfer  SBA and CGA  Transfer Type: SPT  Equipment: Grab Bars and straight cane and HHA    Ambulation NT  Equipment: NA      ACTIVITIES OF DAILY

## 2025-02-19 NOTE — PROGRESS NOTES
[facility] OCCUPATIONAL THERAPY DAILY NOTE  OT Individual Minutes  Time In: 1031  Time Out: 1115  Minutes: 44               Subjective: Pt agreeable to treatment. \"I want to know how much this [R hand] is doing\"  Pain: No pain expressed.  Interdisciplinary Communication: Collaborated with PT, RN, and SLP regarding pt status and pt performance.   Precautions: Falls, Poor Safety Awareness, [place] Alarm, and R Hemiplegia   Lines:N/A  O2 Device: RA    FUNCTIONAL MOBILITY:       Bed Mobility NT    Sit to Stand SBA and CGA    Transfer  SBA and CGA  Transfer Type: SPT  Equipment:  straight cane and HHA    Ambulation NT  Equipment: NA       - Activity Tolerance - Coordination - Energy Conservation/Pacing  - Neuro-Muscular Re-Education - Range of Motion   Pt completed therapeutic activities to challenge  R hand(s) mobility, activity tolerance, AAROM, and GPROM in preparation for safe return to max (I) with I/ADLs. Pt tolerated Pt tolerated activities well with no c/o pain. . Rest breaks utilized as needed throughout..   Pt completed AAROM (self and therapist) and gentle PROM to RUE. Pt initiated on table top riley; 0lb, 3 sets 10 reps. Pt transitioned to gentle PROM to RUE in anterior glide/functional reach and digit extension.  Pt completed anterior glide with hand-over-hand (therapist) assist; 2-3x 8-10  Pt completed arm skate on lower square table top 3x8-10  Reviewed R hand orthotic, continued reduction in spasticity noted, pt reports feeling like R hand gripping more with orthotic in place, denies pain or muscle fatigue symptoms. For tonight pt transitioned to 2-3 rolled washcloth, will re-assess next session     Education   Adaptive ADL Techniques, AE/DME Training, Benefits of OT, Energy Conservation, Pacing, Functional Transfer Training, Precautions, Rolling Walker Management, Safety Awareness, and Stroke Education     Assessment: Patient tolerated session well overall. Pt presents with increased goal of R hand returning to

## 2025-02-20 ENCOUNTER — CARE COORDINATION (OUTPATIENT)
Dept: CARE COORDINATION | Facility: CLINIC | Age: 75
End: 2025-02-20

## 2025-02-20 LAB
GLUCOSE BLD STRIP.AUTO-MCNC: 104 MG/DL (ref 65–100)
GLUCOSE BLD STRIP.AUTO-MCNC: 107 MG/DL (ref 65–100)
SERVICE CMNT-IMP: ABNORMAL
SERVICE CMNT-IMP: ABNORMAL

## 2025-02-20 PROCEDURE — 6360000002 HC RX W HCPCS: Performed by: STUDENT IN AN ORGANIZED HEALTH CARE EDUCATION/TRAINING PROGRAM

## 2025-02-20 PROCEDURE — 6370000000 HC RX 637 (ALT 250 FOR IP): Performed by: STUDENT IN AN ORGANIZED HEALTH CARE EDUCATION/TRAINING PROGRAM

## 2025-02-20 PROCEDURE — 97116 GAIT TRAINING THERAPY: CPT

## 2025-02-20 PROCEDURE — 92507 TX SP LANG VOICE COMM INDIV: CPT

## 2025-02-20 PROCEDURE — 97530 THERAPEUTIC ACTIVITIES: CPT

## 2025-02-20 PROCEDURE — 97535 SELF CARE MNGMENT TRAINING: CPT

## 2025-02-20 PROCEDURE — 1180000000 HC REHAB R&B

## 2025-02-20 PROCEDURE — 97112 NEUROMUSCULAR REEDUCATION: CPT

## 2025-02-20 PROCEDURE — 97110 THERAPEUTIC EXERCISES: CPT

## 2025-02-20 PROCEDURE — 82962 GLUCOSE BLOOD TEST: CPT

## 2025-02-20 RX ADMIN — GLIPIZIDE 2.5 MG: 5 TABLET ORAL at 09:04

## 2025-02-20 RX ADMIN — LISINOPRIL 10 MG: 5 TABLET ORAL at 09:05

## 2025-02-20 RX ADMIN — ATORVASTATIN CALCIUM 80 MG: 80 TABLET, FILM COATED ORAL at 21:29

## 2025-02-20 RX ADMIN — METFORMIN HYDROCHLORIDE 1000 MG: 500 TABLET ORAL at 09:07

## 2025-02-20 RX ADMIN — CLOPIDOGREL BISULFATE 75 MG: 75 TABLET ORAL at 09:06

## 2025-02-20 RX ADMIN — CITALOPRAM HYDROBROMIDE 20 MG: 20 TABLET ORAL at 09:06

## 2025-02-20 RX ADMIN — MIRTAZAPINE 15 MG: 15 TABLET, FILM COATED ORAL at 21:29

## 2025-02-20 RX ADMIN — ENOXAPARIN SODIUM 40 MG: 100 INJECTION SUBCUTANEOUS at 09:06

## 2025-02-20 RX ADMIN — METFORMIN HYDROCHLORIDE 1000 MG: 500 TABLET ORAL at 16:36

## 2025-02-20 RX ADMIN — ASPIRIN 81 MG: 81 TABLET, CHEWABLE ORAL at 09:05

## 2025-02-20 RX ADMIN — HYDROCHLOROTHIAZIDE 12.5 MG: 25 TABLET ORAL at 09:05

## 2025-02-20 ASSESSMENT — PAIN SCALES - GENERAL
PAINLEVEL_OUTOF10: 0
PAINLEVEL_OUTOF10: 0

## 2025-02-20 NOTE — PROGRESS NOTES
bathroom with straight cane        - Activity Tolerance - Coordination - Energy Conservation/Pacing  - Neuro-Muscular Re-Education - Range of Motion   Pt completed therapeutic activities to challenge  R hand(s) mobility, activity tolerance, AAROM, and GPROM in preparation for safe return to max (I) with I/ADLs. Pt tolerated Pt tolerated activities well with no c/o pain. . Rest breaks utilized as needed throughout..   Pt completed AAROM (self and therapist) and gentle PROM to RUE.   Arm skate on table top, external rotation, 3x10 with incr therapist assist hand-over-hand for proper motion/glide. Pt verbalized desire to attempt, attempted however RUE came into internal rotation and RUE movement primarily from trunk movement which pt identified and verbally acknowledged  Table top riley; 0lb, 3x10  GPROM L wrist 1 set, 5-8 reps with gentle prolonged stretch     Education   Adaptive ADL Techniques, AE/DME Training, Benefits of OT, Energy Conservation, Pacing, Functional Transfer Training, [place] Dressing Strategies, Precautions, Rolling Walker Management, Safety Awareness, and Stroke Education     Assessment: Patient tolerated session well overall. Pt reports progressing to rolled hand towel last night in R hand, R hand presenting more palpable and mobile with reduced overall spasticity. Spasticity continues to fluctuate intermittently throughout session in hand however overall mobility significantly improved from initial flexion at SOC. Continued trace to no ext at wrist, when attempted full RUE transitioned into flexion synergy with minimally increased time for stretching out and return to more neutral position. Continued empathetic listening and compassionate discussion around current function of RUE and limitations at present. Encouragement provided on current progression since SOC, though progress not to pt desired goal of significant functional use improvement. Increased education/review of pt breathing/calming

## 2025-02-20 NOTE — CARE COORDINATION
Patients daughter calls and is concerned about discharge plan. She had questions about home health and how it worked. Explained breifly how they would come in and set schedule and work with patient on goals in the home. She was also worried if patient will have time to be trained with AFO after it has been made. Message given to Rossana QUEZADA and she will speak to daughter regarding this one the phone. JERE ARGUETA will continue to follow.

## 2025-02-20 NOTE — PROGRESS NOTES
[facility] OCCUPATIONAL THERAPY DAILY NOTE  OT Individual Minutes  Time In: 1032  Time Out: 1115  Minutes: 43               Subjective: Pt agreeable to treatment. \"I just want this [R] hand back]  Pain: No pain expressed.  Interdisciplinary Communication: Collaborated with PT, RN, and SLP regarding pt status, pt performance, and handoff communication.   Precautions: Falls, Poor Safety Awareness, Talisha Alarm, and R Hemiplegia   Lines:N/A  O2 Device: RA    FUNCTIONAL MOBILITY:       Bed Mobility NT    Sit to Stand CGA    Transfer  SBA and CGA  Transfer Type: SPT  Equipment:  straight cane and HHA    Ambulation NT  Equipment: NA       - Activity Tolerance - Coordination - Energy Conservation/Pacing  - Neuro-Muscular Re-Education - Range of Motion - Strengthening   Pt completed therapeutic activities to challenge  R hand(s) mobility, activity tolerance, AAROM, and GPROM in preparation for safe return to max (I) with I/ADLs. Pt tolerated Pt tolerated activities well with no c/o pain. Rest breaks utilized as needed throughout..   Pt completed AAROM (self and therapist) and gentle PROM to RUE.   Arm bike; low 0-2w resistance; 433 rotations; 10 min steady pace forward, continuous visual and intermittent palpation monitoring of RUE. Pt denied any increased tone/stretch. Pt reported and palpable reduction in overall spasticity/tone throughout exercise.  Pt completed peg and board activity seated at table. Activity graded up with inclusion of R hand. Pt used LUE to retrieve pegs from tray on R. Pt then used BUE to separate stacked [2] pegs. Pt used L hand to place top of peg into space between thumb and 2nd digit for gross grasp. Once  pt used RUE to place pegs into green board placed at L of pt as instructed via verbal and/or diagram instructions. Pt completed ~25 sets of reduced/minimal difficulty. P placed into pattern sorting by color. Pt required increased time with 3-5 drops noted. Pt required 1-3 verbal cues for

## 2025-02-20 NOTE — DIABETES MGMT
Patient seen for follow up diabetes education.  Reviewed blood glucose over past 24 hours (ranged ).  Reviewed ADA blood glucose target goals.  Reviewed current diabetes medication regimen: Glipizide 2.5 mg daily, Metformin 1000 mg BID, and Humalog correctional insulin.  Reviewed effects of carbohydrates on blood glucose, the \"plate method\" of healthy meal planning, basics of healthy meal plan, Consistent Carbohydrate Diet, discussed the basics of carb counting and how to read a nutrition label.  Discussed current diet: 60 gm CHO.  Encouraged compliance with discharge regimen. Encouraged patient to continue to work on lifestyle modifications and to follow up with primary care provider for further titration of regimen. Patient verbalized understanding and voices no further questions regarding diabetes management.  Patient has received greater than 2 hours of diabetes education since hospital admission.  Will sign off.  Please re-consult if further diabetes education is needed.

## 2025-02-20 NOTE — PROGRESS NOTES
[facility]  SPEECH LANGUAGE PATHOLOGY: COMMUNICATION Daily Note #3    MRN: [id number]    ADMISSION DATE: 2/12/2025  PRIMARY DIAGNOSIS: Acute CVA (cerebrovascular accident) (HCC)  Debility [R53.81]  Acute CVA (cerebrovascular accident) (HCC) [I63.9]    ICD-10: Treatment Diagnosis:  R41.841 Cognitive-Communication Deficit  R47.1 Dysarthria and Anarthria    RECOMMENDATIONS:   Recommendations:   -strategies for dysarthria and cognition   Patient continues to require skilled intervention: yes  Recommend speech therapy services during acute inpatient rehab stay and at next level of care       ASSESSMENT   Patient demonstrates mild dysarthria and cognitive-linguistic deficits. He demonstrates good understanding of strategies to help compensate. He needs cues to carryover dysarthria strategies at conversation level.    GENERAL   Subjective: Patient was alert and amiable.    Pain: No reports of pain.    OBJECTIVE   Cognitive communication/dysarthria:  Patient recalled all dysarthria strategies and cognitive communication strategies independently. Patient reports the right side of his tongue seems weaker than the left side, and he bites the sides of his tongue a lot more frequently when eating. He applied dysarthria strategies with appropriate self correction/self monitoring across automatic speech tasks.     Patient completed dysarthria exercises of basic oral motor x 10 each and palatal phrases x2 each with fair to good intelligibility. Patient implement compensatory strategies in short conversation with min cues.    PLAN    Duration/Frequency: Continue to follow patient 1-3 x/week for duration of inpatient rehab stay to address goals listed or until goals met.     GOALS:   LTG: Patient will report improved cognitive functioning as measured by self report/Neuro QOL Scale- cognitive function (baseline raw score: 21/40 with higher scores indicating better self reported outcome)  LTG: Patient will demonstrate understanding and

## 2025-02-20 NOTE — PROGRESS NOTES
PHYSICAL THERAPY DAILY NOTE    PT Individual Minutes  Time In: 1117  Time Out: 1210  Minutes: 53                 Total Treatment Time:  53 Minutes    Pt seen for: Balance Training, Gait Training, Patient Education, Therapeutic Exercise, and Transfer Training     Subjective: Without the brace, I feel like I am walking on my ankle.          Objective:  Pt up in w/c after OT     Restrictions/Precautions: Fall Risk  Required Braces or Orthoses?: Yes           Right Lower Extremity Brace: Ankle Foot Orthotics        GROSS ASSESSMENT           COGNITION Daily Assessment    Overall Orientation Status: Within Normal Limits   Overall Cognitive Status: WNL         BED/MAT MOBILITY Daily Assessment     Rolling to Left: Modified independent  Supine to Sit: Supervision  Sit to Supine: Supervision  Bed Mobility Comments:        TRANSFERS Daily Assessment     Squat pivots to L side only to prevent ankle inversion on R Sit to Stand: Contact guard assistance;Stand by assistance  Stand to Sit: Supervision  Squat Pivot Transfers: Contact guard assistance          GAIT Daily Assessment    Surface: Level tile  Device: Single point cane  Other Apparatus: AFO  Assistance: Minimal assistance  Quality of Gait: amb for orthotist evaluation, caught toes on R 1x, very loose ankle joint  Gait Deviations: Slow Jenni;Decreased step length;Decreased step height;Decreased arm swing  Distance: 80ft  Comments: not performed this session due to AFO not available              STEPS/STAIRS Daily Assessment                   BALANCE Daily Assessment             WHEELCHAIR MOBILITY Daily Assessment                          LOWER EXTREMITY EXERCISES   Sit/stand with hands clasped and R foot on wedge for DF x 10    With R LE over EOM x 10 each:  Passive hip flexor stretch/quad stretch  Hip flex with AFO boot  LAQ with AFO boot  Bridge  R SL bridge with L foot on ball--dec control  HS with R foot on ball   SL clams for R LE  SL hip flex/ext for R LE  SL

## 2025-02-20 NOTE — PLAN OF CARE
Problem: Safety - Adult  Goal: Free from fall injury  Outcome: Progressing     Problem: Chronic Conditions and Co-morbidities  Goal: Patient's chronic conditions and co-morbidity symptoms are monitored and maintained or improved  Outcome: Progressing  Flowsheets (Taken 2/19/2025 2130 by Marcy Loya RN)  Care Plan - Patient's Chronic Conditions and Co-Morbidity Symptoms are Monitored and Maintained or Improved: Monitor and assess patient's chronic conditions and comorbid symptoms for stability, deterioration, or improvement     Problem: Pain  Goal: Verbalizes/displays adequate comfort level or baseline comfort level  Outcome: Progressing     Problem: Skin/Tissue Integrity  Goal: Skin integrity remains intact  Description: 1.  Monitor for areas of redness and/or skin breakdown  2.  Assess vascular access sites hourly  3.  Every 4-6 hours minimum:  Change oxygen saturation probe site  4.  Every 4-6 hours:  If on nasal continuous positive airway pressure, respiratory therapy assess nares and determine need for appliance change or resting period  Outcome: Progressing

## 2025-02-20 NOTE — CARE COORDINATION
Care Transitions Post-Acute Facility Update Call    2025    Patient: Javid Sharp Patient : 1950   MRN: [id number]  Reason for Admission: Acute CVA (cerebrovascular accident)   Discharge Date: 25 RARS: Readmission Risk Score: 11.1    Per chart review plan for discharge from IRF is 25 with home health nurse, PT, OT, speech therapy and aide.  Will notify CTN for transitions of care.

## 2025-02-20 NOTE — PROGRESS NOTES
PHYSICAL THERAPY DAILY NOTE    PT Individual Minutes  Time In: 0832  Time Out: 0920  Minutes: 48                 Total Treatment Time:  48 Minutes    Pt seen for: Balance Training, Gait Training, Patient Education, Therapeutic Exercise, and Transfer Training     Subjective: Without the brace, I feel like I am walking on my ankle.          Objective:  Orthotist from [facility] (Jeff) present for session. Discussed various options but opted for similar brace to be custom made. Pt's current brace to be modified for a back up option. Pt will need new shoes but he usually gets those through his podiatrist.     Restrictions/Precautions: Fall Risk  Required Braces or Orthoses?: Yes           Right Lower Extremity Brace: Ankle Foot Orthotics        GROSS ASSESSMENT   Pt up in w/c in gym. Met with orthotist to determine bracing needs. Pt casted for new AFO.        COGNITION Daily Assessment    Overall Orientation Status: Within Normal Limits   Overall Cognitive Status: WNL  Arousal/Alertness: Appears intact  Following Commands: Follows multistep commands with repitition, Follows multistep commands with increased time  Attention Span: Appears intact  Memory: Appears intact  Safety Judgement: Appears intact  Problem Solving: Assistance required to generate solutions  Insights: Fully aware of deficits  Initiation: Requires cues for some  Sequencing: Requires cues for some        BED/MAT MOBILITY Daily Assessment              TRANSFERS Daily Assessment          Sit/stand with cga       GAIT Daily Assessment    Surface: Level tile  Device: Single point cane  Other Apparatus: AFO  Assistance: Minimal assistance  Quality of Gait: amb for orthotist evaluation, caught toes on R 1x, very loose ankle joint  Gait Deviations: Slow Jenni;Decreased step length;Decreased step height;Decreased arm swing  Distance: 80ft              STEPS/STAIRS Daily Assessment                   BALANCE Daily Assessment             WHEELCHAIR MOBILITY Daily

## 2025-02-21 LAB
GLUCOSE BLD STRIP.AUTO-MCNC: 108 MG/DL (ref 65–100)
GLUCOSE BLD STRIP.AUTO-MCNC: 134 MG/DL (ref 65–100)
GLUCOSE BLD STRIP.AUTO-MCNC: 61 MG/DL (ref 65–100)
GLUCOSE BLD STRIP.AUTO-MCNC: 95 MG/DL (ref 65–100)
SERVICE CMNT-IMP: ABNORMAL
SERVICE CMNT-IMP: NORMAL

## 2025-02-21 PROCEDURE — 92507 TX SP LANG VOICE COMM INDIV: CPT

## 2025-02-21 PROCEDURE — 6360000002 HC RX W HCPCS: Performed by: STUDENT IN AN ORGANIZED HEALTH CARE EDUCATION/TRAINING PROGRAM

## 2025-02-21 PROCEDURE — 82962 GLUCOSE BLOOD TEST: CPT

## 2025-02-21 PROCEDURE — 97116 GAIT TRAINING THERAPY: CPT

## 2025-02-21 PROCEDURE — 97530 THERAPEUTIC ACTIVITIES: CPT

## 2025-02-21 PROCEDURE — 6370000000 HC RX 637 (ALT 250 FOR IP): Performed by: STUDENT IN AN ORGANIZED HEALTH CARE EDUCATION/TRAINING PROGRAM

## 2025-02-21 PROCEDURE — 97112 NEUROMUSCULAR REEDUCATION: CPT

## 2025-02-21 PROCEDURE — 97535 SELF CARE MNGMENT TRAINING: CPT

## 2025-02-21 PROCEDURE — 1180000000 HC REHAB R&B

## 2025-02-21 RX ADMIN — MIRTAZAPINE 15 MG: 15 TABLET, FILM COATED ORAL at 20:00

## 2025-02-21 RX ADMIN — GLIPIZIDE 2.5 MG: 5 TABLET ORAL at 07:34

## 2025-02-21 RX ADMIN — METFORMIN HYDROCHLORIDE 1000 MG: 500 TABLET ORAL at 07:33

## 2025-02-21 RX ADMIN — LISINOPRIL 10 MG: 5 TABLET ORAL at 07:34

## 2025-02-21 RX ADMIN — ASPIRIN 81 MG: 81 TABLET, CHEWABLE ORAL at 07:33

## 2025-02-21 RX ADMIN — ENOXAPARIN SODIUM 40 MG: 100 INJECTION SUBCUTANEOUS at 07:36

## 2025-02-21 RX ADMIN — ATORVASTATIN CALCIUM 80 MG: 80 TABLET, FILM COATED ORAL at 20:00

## 2025-02-21 RX ADMIN — CLOPIDOGREL BISULFATE 75 MG: 75 TABLET ORAL at 07:34

## 2025-02-21 RX ADMIN — CITALOPRAM HYDROBROMIDE 20 MG: 20 TABLET ORAL at 07:33

## 2025-02-21 RX ADMIN — HYDROCHLOROTHIAZIDE 12.5 MG: 25 TABLET ORAL at 07:33

## 2025-02-21 NOTE — PROGRESS NOTES
[facility] OCCUPATIONAL THERAPY DAILY NOTE  OT Individual Minutes  Time In: 1356  Time Out: 1412  Minutes: 16               Subjective: Pt agreeable to treatment. \"My hand is freer\"  Pain: No pain expressed.  Interdisciplinary Communication: Collaborated with PT, RN, and SLP regarding pt status and pt performance.   Precautions: Falls, Poor Safety Awareness, Talisha Alarm, and R Hemiplegia   Lines:N/A  O2 Device: RA    FUNCTIONAL MOBILITY:       Bed Mobility NT    Sit to Stand SBA and CGA    Transfer  SBA and CGA  Transfer Type: SPT  Equipment: Grab Bars and straight cane    Ambulation CGA  Equipment:  straight cane       - Activity Tolerance - Balance - Coordination - Energy Conservation/Pacing  - Self-Care   Pt completed self-care to challenge BUE function, functional mobility and (I) with ADLs in preparation for safe return to max (I) with ADLs. Pt tolerated self-care well with no c/o pain. Rest breaks utilized as needed throughout..  Pt completed:  Toileting; CGA-SBA BM and void seated and standing at commode with grab bars, see assessment and Functional t/f training; simulated household mobility for ADLs in room with straight cane      - KT follow up and education   Pt seated in recliner, skin integrity monitored and intact, no redness visible, pt denied any irritation. Pt verbalized increased pliability/mobility in R hand. Gentle PROM to R hand showed decreased flexor group activation in forearm. Improved trace activation of digit extensors observed primarily in distal 3rd and 4th digit. Pt spouse present and educated on KT tape. Reviewed with pt steps if any pain, irritation of skin changes arise. Discussed with RNJessica.     Education   Adaptive ADL Techniques, AE/DME Training, Benefits of OT, Energy Conservation, Pacing, Family Training, Functional Transfer Training, Precautions, Safety Awareness, and Stroke Education     Assessment: Patient tolerated session well. On entry pt in bathroom up and hiking clothing.

## 2025-02-21 NOTE — PROGRESS NOTES
[place] [address]  Tel: [phone]     Physical Medicine and Rehabilitation Progress Note      Javid Sharp  Admit Date: 2/12/2025  Admit Diagnosis: Debility [R53.81]  Acute CVA (cerebrovascular accident) (HCC) [I63.9]    Subjective     Patient seen patient seen today in his room.  Reports he is doing well still working on his right arm which is taped.  Reports he lives alone but does have family close by.  Does not want home health because he does not want people in his house with his coin collection    Objective:     Current Facility-Administered Medications   Medication Dose Route Frequency    sennosides-docusate sodium (SENOKOT-S) 8.6-50 MG tablet 2 tablet  2 tablet Oral Daily PRN    glipiZIDE (GLUCOTROL) tablet 2.5 mg  2.5 mg Oral QAM AC    aspirin chewable tablet 81 mg  81 mg Oral Daily    atorvastatin (LIPITOR) tablet 80 mg  80 mg Oral Nightly    citalopram (CELEXA) tablet 20 mg  20 mg Oral Daily    enoxaparin (LOVENOX) injection 40 mg  40 mg SubCUTAneous Daily    Glucagon Emergency KIT 1 mg  1 mg SubCUTAneous PRN    glucose chewable tablet 16 g  4 tablet Oral PRN    insulin lispro (HUMALOG,ADMELOG) injection vial 0-4 Units  0-4 Units SubCUTAneous 4x Daily AC & HS    lisinopril (PRINIVIL;ZESTRIL) tablet 10 mg  10 mg Oral Daily    And    hydroCHLOROthiazide (HYDRODIURIL) tablet 12.5 mg  12.5 mg Oral Daily    mirtazapine (REMERON) tablet 15 mg  15 mg Oral Nightly    sodium chloride flush 0.9 % injection 5-40 mL  5-40 mL IntraVENous PRN    metFORMIN (GLUCOPHAGE) tablet 1,000 mg  1,000 mg Oral BID WC    acetaminophen (TYLENOL) tablet 650 mg  650 mg Oral Q4H PRN    sodium phosphate (FLEET) rectal enema 1 enema  1 enema Rectal Daily PRN    bisacodyl (DULCOLAX) suppository 10 mg  10 mg Rectal Daily PRN    calcium carbonate (TUMS) chewable tablet 500 mg  500 mg Oral TID PRN    hydrALAZINE (APRESOLINE) tablet 10 mg  10 mg Oral TID PRN     therapist as needed.  -Sleep: Monitor sleep cycles.   -Skin: Monitor incisions/wounds for adequate healing. Wound care coordinator consulted as needed. Frequent turning while in bed and repositioning in chair. Monitor for skin breakdown or erythema.  -Psychiatric: Monitor for signs and symptoms of depression. Monitor appetite. Monitor for depression anxiety as the patient is adjusting to disability. Monitor and adjust medications as needed.  -Nutrition: Continue current diet and adjust as needed and as tolerated.  Consult dietitian for nutritional assessment and recommendations.  -ID: Patient will be monitored closely for any signs or symptoms of infection, such as elevated white blood cell count, increased temperature, signs of UTI.    -Pain: The patient will be monitored closely for signs and symptoms of pain. Pain regimen will be adjusted as needed.  -Cognitive function/mental status: Has the potential for residual deficits in orientation, processing, attention, thought organization, problem solving, reasoning, word retrieval, and memory given recent injury. Therefore, patient may require continued routine follow up primarily by Speech Language Pathology in addition to Occupational Therapy to address potential underlying deficits and working on higher level-cognitive function with compensatory strategies as indicated.  -Deep venous thromboembolism: patient is at increased risk for thromboembolic event given recent injury, new mobility limitations and current hospitalization. Therefore, maintain on mechanical DVT prophylaxis and encourage progressive out of mobility while continuing routine monitoring for potential thromboembolic events.  -Bowel management: increased potential for recurrent constipation given mobility limitations, current hospitalization, and medication use. Will need to continue routine monitoring of bowel function with appropriate adjustment in bowel regimen as indicated to ensure adequate bowel

## 2025-02-21 NOTE — PROGRESS NOTES
[facility] OCCUPATIONAL THERAPY DAILY NOTE  OT Individual Minutes  Time In: 0750  Time Out: 0918  Minutes: 88               Subjective: Pt agreeable to treatment. \"I want to see if I can just push with this [RUE on arm bike]\"  Pain: No pain expressed.  Interdisciplinary Communication: Collaborated with PT, RN, and SLP regarding pt status, pt performance, and handoff communication.   Precautions: Falls, Poor Safety Awareness, Talisha Alarm, and R Hemiplegia   Lines:N/A  O2 Device: RA      FUNCTIONAL MOBILITY:       Bed Mobility SBA    Sit to Stand SBA and CGA    Transfer  SBA and CGA  Transfer Type: SPT  Equipment: Grab Bars and straight cane and HHA    Ambulation NT  Equipment: NA      ACTIVITIES OF DAILY LIVING:       Eating   S/U - Mod I seated   Oral Hygiene Supervision or touching assistance Sup-V - S/U oral care and hair care seated at sink w/c level   Shower/Bathe Supervision or touching assistance SBA/CGA UB/LB bathing seated and standing at TTB with grab bars, HHSH and LHS, incr time req, 1-3 verbal cues for safety   Upper Body  Dressing Supervision or touching assistance Sup-V doff/don shirt seated, 1-3 verbal cues for completion in sitting for safety 2nd to balance   Lower Body Dressing Supervision or touching assistance CGA doff/don underwear and pants seated and standing with grab bars/bedrails/HHA, pt declined use of reacher today, min incr time however cont improved RLE mob for LB dressing   Donning/Caroleen Footwear Supervision or touching assistance CGA doff/don socks, R AFO and shoes (velcro), incr time pt seated in recliner with back support, Advanced replaced straps on R AFO and notable improvement with ease of donning   Toileting Hygiene   Min A - CGA seated and standing at commode with RW and grab bars   Toilet Transfer   CGA SPT with straight cane/grab bars   Education Adaptive ADL Techniques, AE/DME Training, Benefits of OT, Energy Conservation, Pacing, Functional Transfer Training, Aaron Dressing

## 2025-02-21 NOTE — PROGRESS NOTES
[facility]  SPEECH LANGUAGE PATHOLOGY: COMMUNICATION Daily Note #4    MRN: [id number]    ADMISSION DATE: 2/12/2025  PRIMARY DIAGNOSIS: Acute CVA (cerebrovascular accident) (HCC)  Debility [R53.81]  Acute CVA (cerebrovascular accident) (HCC) [I63.9]    ICD-10: Treatment Diagnosis:  R41.841 Cognitive-Communication Deficit  R47.1 Dysarthria and Anarthria    RECOMMENDATIONS:   Recommendations:   -strategies for dysarthria and cognition   Patient continues to require skilled intervention: yes  Recommend speech therapy services during acute inpatient rehab stay and at next level of care       ASSESSMENT   Patient demonstrates mild dysarthria and cognitive-linguistic deficits. He demonstrates good understanding of strategies to help compensate. He needs cues to carryover dysarthria strategies at conversation level.    GENERAL   Subjective: Patient was alert and amiable.    Pain: No reports of pain.    OBJECTIVE   Cognitive communication/dysarthria:  Patient recalled all dysarthria strategies and cognitive communication strategies independently. Javid begin self-cueing during oral motor strengthening exercises and conversational speech to speak louder, slower, and clearer. He applied dysarthria strategies with increased appropriate self correction/self monitoring 40% of the time in conversational speech. Patient completed dysarthria exercises of basic oral motor x 10 each  (exaggerated vowels, oo-ee, puckered lips, sucked in cheeks, and big cheesy grin).    PLAN    Duration/Frequency: Continue to follow patient 1-3 x/week for duration of inpatient rehab stay to address goals listed or until goals met.     GOALS:   LTG: Patient will report improved cognitive functioning as measured by self report/Neuro QOL Scale- cognitive function (baseline raw score: 21/40 with higher scores indicating better self reported outcome)  LTG: Patient will demonstrate understanding and utilize compensatory strategies for speech intelligibility to

## 2025-02-21 NOTE — PROGRESS NOTES
PHYSICAL THERAPY DAILY NOTE    PT Individual Minutes  Time In: 0915  Time Out: 1001  Minutes: 46                 Total Treatment Time:  46 Minutes    Pt seen for: Balance Training, Gait Training, Patient Education, Therapeutic Exercise, and Transfer Training     Subjective: I notice a lot of improvement in my walking with the brace adjustments.         Objective:  Pt up in w/c after OT . R AFO donned.    Restrictions/Precautions: Fall Risk  Required Braces or Orthoses?: Yes           Right Lower Extremity Brace: Ankle Foot Orthotics        GROSS ASSESSMENT           COGNITION Daily Assessment    Overall Orientation Status: Within Normal Limits   Overall Cognitive Status: WNL         BED/MAT MOBILITY Daily Assessment     N/a        TRANSFERS Daily Assessment      Sit to Stand: Supervision  Stand to Sit: Supervision  Stand Pivot Transfers: Stand by assistance          GAIT Daily Assessment    Surface: Level tile  Device: Single point cane  Other Apparatus: AFO;Right  Assistance: Contact guard assistance;Stand by assistance  Quality of Gait: much improved swing on R with inc DF on AFO. Manual cues through pelvis to inc wt shift to R LE in stance and to L shld to avoid excessive trunk rotation. Verbal cues to avoid overreaching with cane on L with good ability to make adjustments  Distance: 120ft x 2              STEPS/STAIRS Daily Assessment        # Steps : 4  Stairs Height: 6\"  Rails: Right ascending  Assistance: Contact guard assistance  Comment: sidestep technique.  Improved R foot placement on step with descending and dec knee hyperextension with AFO adjustements          BALANCE Daily Assessment             WHEELCHAIR MOBILITY Daily Assessment                          LOWER EXTREMITY EXERCISES   In // bars for NMR without UE support (improved from needing cane last attempt):  Wt shifting activities to R side--forward/back steps with L with manual cues to shift to R with stance  Stepping with R foot forward and  back with assist to wt shift diagonally and forward onto R foot with L UE reach across body    In // bars with L UE support:  Amb forward and back with cues to inc hip ext on R with stepping back and maintaining upright posture, cues for inc L hip/knee flex with forward to inc stance time and wt shift onto R LE  Sidestepping L/R with L UE support on bar:  assist with trunk shifting as pt tends to lean L and stay to L, cues to avoid dragging R foot when stepping to L side. Manual facilitation of R glut med for hip abd with sidestepping to R.       Pain level: 0  Pain Location:    Pain Interventions:     Vital Signs:  BP (!) 141/67   Pulse 85   Temp 97.9 °F (36.6 °C) (Oral)   Resp 17   Ht 1.676 m (5' 5.98\")   Wt 86.2 kg (190 lb)   SpO2 93%   BMI 30.68 kg/m²       Education:  engaging weak muscles instead of compensating  Education Given To: Patient  Education Provided: Precautions;Mobility Training;Transfer Training;Safety  Education Method: Verbal;Demonstration  Barriers to Learning: None  Education Outcome: Verbalized understanding;Demonstrated understanding;Continued education needed     Interdisciplinary Communication: Collaborated w/ OT     Pt left in recliner with alarm on and all needs in reach. Awaiting ST.         Assessment: Pt demonstrates good effort at correction of gait. Much improved R LE swing and R wt shift with adjustments to AFO. AFO preventing R knee hyperextension, therefore, more engagement of knee co-contraction. Pt is very responsive to cueing and able to make good adjustments. Continues to have instability with attempts to slow down and improve gait technique. 1 LOB during gait with assist required to recover. Pt will benefit con't PT to address his gait and replace habits with improved technique. Pt encouraged to maintain his AFO and shoes more regularly to ensure proper use.  Pt will benefit continued physical therapy in rehab setting to improve endurance and strength, functional

## 2025-02-21 NOTE — PROGRESS NOTES
PHYSICAL THERAPY DAILY NOTE    PT Individual Minutes  Time In: 1115  Time Out: 1203  Minutes: 48                 Total Treatment Time:  48 Minutes    Pt seen for: Balance Training, Gait Training, Patient Education, Therapeutic Exercise, and Transfer Training     Subjective: I notice a lot of improvement in my walking with the brace adjustments.         Objective:  Pt up in recliner. Amb to gym with cane. Performed NMR in standing until orthotist arrived with new AFO. Pt amb 30ft several times and adjustments made to AFO to inc DF. Pt denies any pain with new brace.     Restrictions/Precautions: Fall Risk  Required Braces or Orthoses?: Yes           Right Lower Extremity Brace: Ankle Foot Orthotics        GROSS ASSESSMENT           COGNITION Daily Assessment    Overall Orientation Status: Within Normal Limits   Overall Cognitive Status: WNL         BED/MAT MOBILITY Daily Assessment     N/a        TRANSFERS Daily Assessment      Sit to Stand: Supervision  Stand to Sit: Supervision  Stand Pivot Transfers: Stand by assistance          GAIT Daily Assessment   RW used for midline orientation and inc use of R UE Surface: Level tile  Device: RW  Other Apparatus: AFO;Right  Assistance: Contact guard assistance;Stand by assistance  Quality of Gait: much improved swing on R with inc DF on AFO. Manual cues through pelvis to inc wt shift to R LE in stance and to L shld to avoid excessive trunk rotation. Verbal cues to avoid overreaching with cane on L with good ability to make adjustments  Distance: 150ft  Comments: cueing at shoulders to maintain upright and avoid excessive rotation with cane use              STEPS/STAIRS Daily Assessment        # Steps : 4  Stairs Height: 6\"  Rails: Right ascending  Assistance: Contact guard assistance  Comment: sidestep technique.  Improved R foot placement on step with descending and dec knee hyperextension with AFO adjustements          BALANCE Daily Assessment             WHEELCHAIR

## 2025-02-22 LAB
GLUCOSE BLD STRIP.AUTO-MCNC: 109 MG/DL (ref 65–100)
GLUCOSE BLD STRIP.AUTO-MCNC: 123 MG/DL (ref 65–100)
GLUCOSE BLD STRIP.AUTO-MCNC: 138 MG/DL (ref 65–100)
GLUCOSE BLD STRIP.AUTO-MCNC: 147 MG/DL (ref 65–100)
GLUCOSE BLD STRIP.AUTO-MCNC: 53 MG/DL (ref 65–100)
SERVICE CMNT-IMP: ABNORMAL

## 2025-02-22 PROCEDURE — 6360000002 HC RX W HCPCS: Performed by: STUDENT IN AN ORGANIZED HEALTH CARE EDUCATION/TRAINING PROGRAM

## 2025-02-22 PROCEDURE — 6370000000 HC RX 637 (ALT 250 FOR IP): Performed by: STUDENT IN AN ORGANIZED HEALTH CARE EDUCATION/TRAINING PROGRAM

## 2025-02-22 PROCEDURE — 1180000000 HC REHAB R&B

## 2025-02-22 PROCEDURE — 97140 MANUAL THERAPY 1/> REGIONS: CPT

## 2025-02-22 PROCEDURE — 99232 SBSQ HOSP IP/OBS MODERATE 35: CPT | Performed by: INTERNAL MEDICINE

## 2025-02-22 PROCEDURE — 82962 GLUCOSE BLOOD TEST: CPT

## 2025-02-22 PROCEDURE — 97535 SELF CARE MNGMENT TRAINING: CPT

## 2025-02-22 PROCEDURE — 97116 GAIT TRAINING THERAPY: CPT

## 2025-02-22 RX ADMIN — ATORVASTATIN CALCIUM 80 MG: 80 TABLET, FILM COATED ORAL at 20:54

## 2025-02-22 RX ADMIN — MIRTAZAPINE 15 MG: 15 TABLET, FILM COATED ORAL at 20:54

## 2025-02-22 RX ADMIN — METFORMIN HYDROCHLORIDE 1000 MG: 500 TABLET ORAL at 08:16

## 2025-02-22 RX ADMIN — HYDROCHLOROTHIAZIDE 12.5 MG: 25 TABLET ORAL at 08:16

## 2025-02-22 RX ADMIN — METFORMIN HYDROCHLORIDE 1000 MG: 500 TABLET ORAL at 16:57

## 2025-02-22 RX ADMIN — CLOPIDOGREL BISULFATE 75 MG: 75 TABLET ORAL at 08:16

## 2025-02-22 RX ADMIN — ASPIRIN 81 MG: 81 TABLET, CHEWABLE ORAL at 08:16

## 2025-02-22 RX ADMIN — LISINOPRIL 10 MG: 5 TABLET ORAL at 08:16

## 2025-02-22 RX ADMIN — CITALOPRAM HYDROBROMIDE 20 MG: 20 TABLET ORAL at 08:16

## 2025-02-22 RX ADMIN — GLIPIZIDE 2.5 MG: 5 TABLET ORAL at 08:16

## 2025-02-22 RX ADMIN — ENOXAPARIN SODIUM 40 MG: 100 INJECTION SUBCUTANEOUS at 08:17

## 2025-02-22 NOTE — PROGRESS NOTES
Occupational Therapy    [facility] OCCUPATIONAL THERAPY DAILY NOTE  OT Individual Minutes  Time In: 1035  Time Out: 1106  Minutes: 31               Subjective: Pt agreeable to treatment. Pt. Stated \"Getting better, slow.\"  Pain: Patient denies pain.  Interdisciplinary Communication: Collaborated with PT regarding pt status.   Precautions: Falls, Poor Safety Awareness, and video monitor in room  Lines:N/A  O2 Device: RA      FUNCTIONAL MOBILITY:       Bed Mobility Supervision    Sit to Stand CGA With use of SPC   Transfer  CGA  Transfer Type: SPT  Equipment: SPC    Ambulation CGA  Equipment: SPC      ACTIVITIES OF DAILY LIVING:       Oral Hygiene 5: S/U or clean-up assistance W/c level   Lower Body Dressing 3: Partial/Moderate A Min A; assist threading RLE through pant leg   Donning/[place] Footwear 4: Supervision or touching A CGA; sitting EOB due to decreased dynamic sitting balance; donning B shoes   Education Adaptive ADL Techniques, AE/DME Training, Benefits of OT, Energy Conservation, Pacing, Functional Transfer Training, Aaron Dressing Strategies, Precautions, Safety Awareness, and Stroke Education        - Manual Therapy   Therapist performed PROM/stretching of RUE in order to address RUE tone, ROM, and functional use s/p CVA.       Assessment: Patient agreeable to participate in skilled OT therapy this date. Pt demonstrated fair activity tolerance and good participation in OT treatment. Pt continues to benefit from skilled OT services to address remaining deficits and progress toward baseline level of independence and safety. Patient ended session seated in gym with PT.   Plan: Continue OT POC.     CARRINGTON Reis/EMILY  2/22/2025

## 2025-02-22 NOTE — PROGRESS NOTES
Pt's blood sugar 53. Pt given orange juice and is now eating his lunch. Dr. Carrion informed of blood sugar 53, will recheck after he eats lunch.

## 2025-02-22 NOTE — PLAN OF CARE
Problem: Safety - Adult  Goal: Free from fall injury  2/22/2025 1058 by Kellen Villalobos, RN  Outcome: Progressing  2/21/2025 2111 by Flakita Valero, RN  Outcome: Progressing     Problem: Chronic Conditions and Co-morbidities  Goal: Patient's chronic conditions and co-morbidity symptoms are monitored and maintained or improved  Outcome: Progressing     Problem: Pain  Goal: Verbalizes/displays adequate comfort level or baseline comfort level  Outcome: Progressing     Problem: Skin/Tissue Integrity  Goal: Skin integrity remains intact  Description: 1.  Monitor for areas of redness and/or skin breakdown  2.  Assess vascular access sites hourly  3.  Every 4-6 hours minimum:  Change oxygen saturation probe site  4.  Every 4-6 hours:  If on nasal continuous positive airway pressure, respiratory therapy assess nares and determine need for appliance change or resting period  Outcome: Progressing

## 2025-02-22 NOTE — PROGRESS NOTES
Physical Therapy  PHYSICAL THERAPY DAILY NOTE    PT Individual Minutes  Time In: 1119  Time Out: 1147  Minutes: 28                 Total Treatment Time:  28 Minutes    Pt. Seen for: AM, Gait Training, Patient Education, and Transfer Training     Subjective: \"right leg still weak gotta work on it\"         Objective:  Restrictions/Precautions: Fall Risk  Required Braces or Orthoses?: Yes           Right Lower Extremity Brace: Ankle Foot Orthotics  Other Position/Activity Restrictions: pt wearing old AFO that has been adjusted to have inc DF and new straps.         GROSS ASSESSMENT           COGNITION Daily Assessment    Orientation Level: Oriented X4   Overall Cognitive Status: WNL        BED/MAT MOBILITY Daily Assessment              TRANSFERS Daily Assessment    Sit to Stand: Contact guard assistance  Stand to Sit: Contact guard assistance  Bed to Chair: Contact guard assistance  Stand Pivot Transfers: Contact guard assistance          GAIT Daily Assessment   Additional 100ft x2, 120ft xw, R AFO donned Surface: Level tile  Device: Single point cane  Other Apparatus: AFO  Assistance: Contact guard assistance  Distance: 100              STEPS/STAIRS Daily Assessment                   BALANCE Daily Assessment    Static Sitting: Good:  Pt. able to maintain balance w/o UE support;  exhibits some postural sway  Dynamic Sitting: Good - accepts moderate challenge;  can maintain balance while picking object off the floor  Static Standing: Fair:  Pt. requires UE support;  may need occasional min A  Dynamic Standing: Fair - accepts minimal challenge;  can maintain balance while turning head/trunk       WHEELCHAIR MOBILITY Daily Assessment                          LOWER EXTREMITY EXERCISES        Pain level: 0/10  Pain Location:  NA  Pain Interventions: NA    Vital Signs:  WNL    Education:    Education Given To: Patient  Education Provided: Role of Therapy  Education Method: Demonstration  Barriers to Learning: None  Education

## 2025-02-22 NOTE — PROGRESS NOTES
[place] [address]  Tel: [phone]     Physical Medicine and Rehabilitation Progress Note      Javid Sharp  Admit Date: 2/12/2025  Admit Diagnosis: Debility [R53.81]  Acute CVA (cerebrovascular accident) (HCC) [I63.9]    Subjective     Patient seen and examined on rounds today.   he reports he is doing well denies any nausea vomiting diarrhea no chest pain.  Asking for water this morning    Objective:     Current Facility-Administered Medications   Medication Dose Route Frequency    sennosides-docusate sodium (SENOKOT-S) 8.6-50 MG tablet 2 tablet  2 tablet Oral Daily PRN    glipiZIDE (GLUCOTROL) tablet 2.5 mg  2.5 mg Oral QAM AC    aspirin chewable tablet 81 mg  81 mg Oral Daily    atorvastatin (LIPITOR) tablet 80 mg  80 mg Oral Nightly    citalopram (CELEXA) tablet 20 mg  20 mg Oral Daily    enoxaparin (LOVENOX) injection 40 mg  40 mg SubCUTAneous Daily    Glucagon Emergency KIT 1 mg  1 mg SubCUTAneous PRN    glucose chewable tablet 16 g  4 tablet Oral PRN    insulin lispro (HUMALOG,ADMELOG) injection vial 0-4 Units  0-4 Units SubCUTAneous 4x Daily AC & HS    lisinopril (PRINIVIL;ZESTRIL) tablet 10 mg  10 mg Oral Daily    And    hydroCHLOROthiazide (HYDRODIURIL) tablet 12.5 mg  12.5 mg Oral Daily    mirtazapine (REMERON) tablet 15 mg  15 mg Oral Nightly    sodium chloride flush 0.9 % injection 5-40 mL  5-40 mL IntraVENous PRN    metFORMIN (GLUCOPHAGE) tablet 1,000 mg  1,000 mg Oral BID WC    acetaminophen (TYLENOL) tablet 650 mg  650 mg Oral Q4H PRN    sodium phosphate (FLEET) rectal enema 1 enema  1 enema Rectal Daily PRN    bisacodyl (DULCOLAX) suppository 10 mg  10 mg Rectal Daily PRN    calcium carbonate (TUMS) chewable tablet 500 mg  500 mg Oral TID PRN    hydrALAZINE (APRESOLINE) tablet 10 mg  10 mg Oral TID PRN    ondansetron (ZOFRAN-ODT) disintegrating tablet 4 mg  4 mg Oral Q8H PRN    medicated lip ointment (BLISTEX)   Topical PRN

## 2025-02-23 VITALS
OXYGEN SATURATION: 92 % | WEIGHT: 190 LBS | BODY MASS INDEX: 30.53 KG/M2 | HEART RATE: 90 BPM | TEMPERATURE: 98.4 F | RESPIRATION RATE: 16 BRPM | HEIGHT: 66 IN | DIASTOLIC BLOOD PRESSURE: 80 MMHG | SYSTOLIC BLOOD PRESSURE: 126 MMHG

## 2025-02-23 LAB
GLUCOSE BLD STRIP.AUTO-MCNC: 106 MG/DL (ref 65–100)
GLUCOSE BLD STRIP.AUTO-MCNC: 125 MG/DL (ref 65–100)
GLUCOSE BLD STRIP.AUTO-MCNC: 75 MG/DL (ref 65–100)
GLUCOSE BLD STRIP.AUTO-MCNC: 89 MG/DL (ref 65–100)
SERVICE CMNT-IMP: ABNORMAL
SERVICE CMNT-IMP: ABNORMAL
SERVICE CMNT-IMP: NORMAL
SERVICE CMNT-IMP: NORMAL

## 2025-02-23 PROCEDURE — 99232 SBSQ HOSP IP/OBS MODERATE 35: CPT | Performed by: INTERNAL MEDICINE

## 2025-02-23 PROCEDURE — 82962 GLUCOSE BLOOD TEST: CPT

## 2025-02-23 PROCEDURE — 6370000000 HC RX 637 (ALT 250 FOR IP): Performed by: STUDENT IN AN ORGANIZED HEALTH CARE EDUCATION/TRAINING PROGRAM

## 2025-02-23 PROCEDURE — 6360000002 HC RX W HCPCS: Performed by: STUDENT IN AN ORGANIZED HEALTH CARE EDUCATION/TRAINING PROGRAM

## 2025-02-23 PROCEDURE — 1180000000 HC REHAB R&B

## 2025-02-23 RX ORDER — INSULIN LISPRO 100 [IU]/ML
0-4 INJECTION, SOLUTION INTRAVENOUS; SUBCUTANEOUS
Status: DISCONTINUED | OUTPATIENT
Start: 2025-02-23 | End: 2025-02-23

## 2025-02-23 RX ADMIN — CITALOPRAM HYDROBROMIDE 20 MG: 20 TABLET ORAL at 08:13

## 2025-02-23 RX ADMIN — MIRTAZAPINE 15 MG: 15 TABLET, FILM COATED ORAL at 20:35

## 2025-02-23 RX ADMIN — METFORMIN HYDROCHLORIDE 1000 MG: 500 TABLET ORAL at 16:54

## 2025-02-23 RX ADMIN — GLIPIZIDE 2.5 MG: 5 TABLET ORAL at 08:13

## 2025-02-23 RX ADMIN — ATORVASTATIN CALCIUM 80 MG: 80 TABLET, FILM COATED ORAL at 20:35

## 2025-02-23 RX ADMIN — CLOPIDOGREL BISULFATE 75 MG: 75 TABLET ORAL at 08:13

## 2025-02-23 RX ADMIN — ASPIRIN 81 MG: 81 TABLET, CHEWABLE ORAL at 08:13

## 2025-02-23 RX ADMIN — ENOXAPARIN SODIUM 40 MG: 100 INJECTION SUBCUTANEOUS at 08:13

## 2025-02-23 RX ADMIN — METFORMIN HYDROCHLORIDE 1000 MG: 500 TABLET ORAL at 08:13

## 2025-02-23 RX ADMIN — LISINOPRIL 10 MG: 5 TABLET ORAL at 08:13

## 2025-02-23 RX ADMIN — HYDROCHLOROTHIAZIDE 12.5 MG: 25 TABLET ORAL at 08:12

## 2025-02-23 ASSESSMENT — PAIN SCALES - GENERAL
PAINLEVEL_OUTOF10: 0
PAINLEVEL_OUTOF10: 0

## 2025-02-23 NOTE — PROGRESS NOTES
[place] [address]  Tel: [phone]     Physical Medicine and Rehabilitation Progress Note      Javid Sharp  Admit Date: 2/12/2025  Admit Diagnosis: Debility [R53.81]  Acute CVA (cerebrovascular accident) (HCC) [I63.9]    Subjective     Patient seen and examined on rounds today.  He is doing well no new complaints we did discuss that he is having tightness in his right biceps but he does not want to start baclofen.  I will DC his insulin because his blood sugars are stable.  Denies any chest pain nausea vomiting or diarrhea    Objective:     Current Facility-Administered Medications   Medication Dose Route Frequency    sennosides-docusate sodium (SENOKOT-S) 8.6-50 MG tablet 2 tablet  2 tablet Oral Daily PRN    glipiZIDE (GLUCOTROL) tablet 2.5 mg  2.5 mg Oral QAM AC    aspirin chewable tablet 81 mg  81 mg Oral Daily    atorvastatin (LIPITOR) tablet 80 mg  80 mg Oral Nightly    citalopram (CELEXA) tablet 20 mg  20 mg Oral Daily    enoxaparin (LOVENOX) injection 40 mg  40 mg SubCUTAneous Daily    Glucagon Emergency KIT 1 mg  1 mg SubCUTAneous PRN    glucose chewable tablet 16 g  4 tablet Oral PRN    lisinopril (PRINIVIL;ZESTRIL) tablet 10 mg  10 mg Oral Daily    And    hydroCHLOROthiazide (HYDRODIURIL) tablet 12.5 mg  12.5 mg Oral Daily    mirtazapine (REMERON) tablet 15 mg  15 mg Oral Nightly    sodium chloride flush 0.9 % injection 5-40 mL  5-40 mL IntraVENous PRN    metFORMIN (GLUCOPHAGE) tablet 1,000 mg  1,000 mg Oral BID WC    acetaminophen (TYLENOL) tablet 650 mg  650 mg Oral Q4H PRN    sodium phosphate (FLEET) rectal enema 1 enema  1 enema Rectal Daily PRN    bisacodyl (DULCOLAX) suppository 10 mg  10 mg Rectal Daily PRN    calcium carbonate (TUMS) chewable tablet 500 mg  500 mg Oral TID PRN    hydrALAZINE (APRESOLINE) tablet 10 mg  10 mg Oral TID PRN    ondansetron (ZOFRAN-ODT) disintegrating tablet 4 mg  4 mg Oral Q8H PRN    medicated lip

## 2025-02-23 NOTE — PLAN OF CARE
Problem: Safety - Adult  Goal: Free from fall injury  2/22/2025 1950 by Marie Loya RN  Outcome: Progressing  2/22/2025 1058 by Kellen Villalobos RN  Outcome: Progressing     Problem: Chronic Conditions and Co-morbidities  Goal: Patient's chronic conditions and co-morbidity symptoms are monitored and maintained or improved  2/22/2025 1058 by Kellen Villalobos RN  Outcome: Progressing     Problem: Pain  Goal: Verbalizes/displays adequate comfort level or baseline comfort level  2/22/2025 1950 by Marie Loya RN  Outcome: Progressing  2/22/2025 1058 by Kellen Villalobos RN  Outcome: Progressing     Problem: Skin/Tissue Integrity  Goal: Skin integrity remains intact  Description: 1.  Monitor for areas of redness and/or skin breakdown  2.  Assess vascular access sites hourly  3.  Every 4-6 hours minimum:  Change oxygen saturation probe site  4.  Every 4-6 hours:  If on nasal continuous positive airway pressure, respiratory therapy assess nares and determine need for appliance change or resting period  2/22/2025 1950 by Marie Loya RN  Outcome: Progressing  2/22/2025 1058 by Kellen Villalobos RN  Outcome: Progressing

## 2025-02-24 LAB
GLUCOSE BLD STRIP.AUTO-MCNC: 106 MG/DL (ref 65–100)
GLUCOSE BLD STRIP.AUTO-MCNC: 108 MG/DL (ref 65–100)
GLUCOSE BLD STRIP.AUTO-MCNC: 116 MG/DL (ref 65–100)
GLUCOSE BLD STRIP.AUTO-MCNC: 97 MG/DL (ref 65–100)
SERVICE CMNT-IMP: ABNORMAL
SERVICE CMNT-IMP: NORMAL

## 2025-02-24 PROCEDURE — 1180000000 HC REHAB R&B

## 2025-02-24 PROCEDURE — 6370000000 HC RX 637 (ALT 250 FOR IP): Performed by: STUDENT IN AN ORGANIZED HEALTH CARE EDUCATION/TRAINING PROGRAM

## 2025-02-24 PROCEDURE — 97530 THERAPEUTIC ACTIVITIES: CPT

## 2025-02-24 PROCEDURE — 6360000002 HC RX W HCPCS: Performed by: STUDENT IN AN ORGANIZED HEALTH CARE EDUCATION/TRAINING PROGRAM

## 2025-02-24 PROCEDURE — 97116 GAIT TRAINING THERAPY: CPT

## 2025-02-24 PROCEDURE — 92507 TX SP LANG VOICE COMM INDIV: CPT

## 2025-02-24 PROCEDURE — 99232 SBSQ HOSP IP/OBS MODERATE 35: CPT | Performed by: INTERNAL MEDICINE

## 2025-02-24 PROCEDURE — 82962 GLUCOSE BLOOD TEST: CPT

## 2025-02-24 PROCEDURE — 97110 THERAPEUTIC EXERCISES: CPT

## 2025-02-24 PROCEDURE — 97112 NEUROMUSCULAR REEDUCATION: CPT

## 2025-02-24 PROCEDURE — 97535 SELF CARE MNGMENT TRAINING: CPT

## 2025-02-24 RX ORDER — CITALOPRAM HYDROBROMIDE 20 MG/1
20 TABLET ORAL DAILY
Qty: 90 TABLET | Refills: 1 | Status: SHIPPED | OUTPATIENT
Start: 2025-02-24 | End: 2025-08-23

## 2025-02-24 RX ORDER — GLIPIZIDE 2.5 MG/1
2.5 TABLET ORAL
Qty: 60 TABLET | Refills: 3 | Status: SHIPPED | OUTPATIENT
Start: 2025-02-25

## 2025-02-24 RX ORDER — CLOPIDOGREL BISULFATE 75 MG/1
75 TABLET ORAL DAILY
Qty: 19 TABLET | Refills: 0 | Status: SHIPPED | OUTPATIENT
Start: 2025-02-24 | End: 2025-03-15

## 2025-02-24 RX ORDER — ASPIRIN 81 MG/1
81 TABLET, CHEWABLE ORAL DAILY
Qty: 30 TABLET | Refills: 0 | Status: SHIPPED | OUTPATIENT
Start: 2025-02-25

## 2025-02-24 RX ADMIN — ASPIRIN 81 MG: 81 TABLET, CHEWABLE ORAL at 07:54

## 2025-02-24 RX ADMIN — CITALOPRAM HYDROBROMIDE 20 MG: 20 TABLET ORAL at 07:54

## 2025-02-24 RX ADMIN — ENOXAPARIN SODIUM 40 MG: 100 INJECTION SUBCUTANEOUS at 07:57

## 2025-02-24 RX ADMIN — LISINOPRIL 10 MG: 5 TABLET ORAL at 07:57

## 2025-02-24 RX ADMIN — MIRTAZAPINE 15 MG: 15 TABLET, FILM COATED ORAL at 21:43

## 2025-02-24 RX ADMIN — GLIPIZIDE 2.5 MG: 5 TABLET ORAL at 07:56

## 2025-02-24 RX ADMIN — HYDROCHLOROTHIAZIDE 12.5 MG: 25 TABLET ORAL at 07:55

## 2025-02-24 RX ADMIN — ATORVASTATIN CALCIUM 80 MG: 80 TABLET, FILM COATED ORAL at 21:42

## 2025-02-24 RX ADMIN — METFORMIN HYDROCHLORIDE 1000 MG: 500 TABLET ORAL at 16:54

## 2025-02-24 RX ADMIN — CLOPIDOGREL BISULFATE 75 MG: 75 TABLET ORAL at 07:56

## 2025-02-24 RX ADMIN — METFORMIN HYDROCHLORIDE 1000 MG: 500 TABLET ORAL at 07:54

## 2025-02-24 NOTE — PROGRESS NOTES
[facility]  SPEECH LANGUAGE PATHOLOGY: COMMUNICATION Daily Note #5  And DISCHARGE Summary  MRN: [id number]    ADMISSION DATE: 2/12/2025  PRIMARY DIAGNOSIS: Acute CVA (cerebrovascular accident) ([facility])  Debility [R53.81]  Acute CVA (cerebrovascular accident) (HCC) [I63.9]    ICD-10: Treatment Diagnosis:  R41.841 Cognitive-Communication Deficit  R47.1 Dysarthria and Anarthria    RECOMMENDATIONS:   Recommendations:   -strategies for dysarthria and cognition   Patient continues to require skilled intervention: yes  Recommend speech therapy services during acute inpatient rehab stay and at next level of care       ASSESSMENT   Patient demonstrates mild dysarthria and cognitive-linguistic deficits. He demonstrates good understanding of strategies to help compensate. He needs min-mod cues to carryover dysarthria strategies at conversation level.    He also displays language processing deficits for which he implements and recalls strategies in a quiet environment with min cues.    GENERAL   Subjective: Patient was alert and amiable. Patient reports that he is sensitive to loudness and has a constant ringing in his ears.    Pain: No reports of pain.    OBJECTIVE   Cognitive communication/dysarthria:  Patient recalled all dysarthria strategies and cognitive communication strategies independently. Javid begin self-cueing during oral motor strengthening exercises and conversational speech to speak louder, slower, and clearer. Patient completed dysarthria exercises of basic oral motor  2 sets of 10 each (exaggerated vowels, oo-ee, puckered lips, sucked in cheeks, and big cheesy grin). Patient educated on the importance of deep breathing, which results in a louder voice, to activate the muscles for a clearer voice. He asks questions and verbalized understanding of this information.    Patient recalled with min-mod cues his language processing strategies, which he taught back with additional time in a quiet environment.    PLAN

## 2025-02-24 NOTE — PROGRESS NOTES
PHYSICAL THERAPY DISCHARGE SUMMARY    PT Individual Minutes  Time In: 1119  Time Out: 1210  Minutes: 51                   Total Treatment Time:  51 Minutes           Precautions at discharge:      Restrictions/Precautions: Fall Risk  Required Braces or Orthoses?: Yes           Right Lower Extremity Brace: Ankle Foot Orthotics   Other Position/Activity Restrictions: -    Impairments:    Decreased LE strength  [x]     Decreased strength trunk/core  [x]     Decreased AROM   [x]     Decreased PROM  [x]    Decreased endurance  [x]     Decreased balance sitting  []     Decreased balance standing  [x]     Pain   []     Slow ambulation velocity  [x]    Decreased coordination  [x]    Decreased safety awareness  []       Functional Limitations:   Decreased independence with bed mobility  []     Decreased independence with functional transfers  [x]     Decreased independence with ambulation  [x]     Decreased independence with stair negotiation  [x]               Subjective: no new issues    Objective:     Vital Signs: /69   Pulse 72   Temp 98.4 °F (36.9 °C) (Oral)   Resp 18   Ht 1.676 m (5' 5.98\")   Wt 86.2 kg (190 lb)   SpO2 94%   BMI 30.68 kg/m²       Pain level: 0  Pain location:   Pain interventions:      Patient education:    Education Given To: Patient  Education Provided: Transfer Training;Mobility Training;Safety  Education Provided Comments: pt reports his wife can attend FT tomorrow  Education Method: Demonstration;Verbal  Barriers to Learning: None  Education Outcome: Verbalized understanding;Demonstrated understanding;Continued education needed    Interdisciplinary Communication: Collaborated w/ OT and care team regarding pt's discharge plan and outcomes.       Cognition:   Overall Orientation Status: Within Normal Limits  Overall Cognitive Status: WNL  Arousal/Alertness: Appears intact  Following Commands: Follows multistep commands with repitition, Follows multistep commands with increased

## 2025-02-24 NOTE — PLAN OF CARE
Problem: Safety - Adult  Goal: Free from fall injury  2/24/2025 0848 by Marcelo Reinoso, RN  Outcome: Progressing  2/23/2025 1957 by Marie Loya RN  Outcome: Progressing     Problem: Chronic Conditions and Co-morbidities  Goal: Patient's chronic conditions and co-morbidity symptoms are monitored and maintained or improved  Outcome: Progressing  Flowsheets (Taken 2/24/2025 0740)  Care Plan - Patient's Chronic Conditions and Co-Morbidity Symptoms are Monitored and Maintained or Improved: Monitor and assess patient's chronic conditions and comorbid symptoms for stability, deterioration, or improvement     Problem: Pain  Goal: Verbalizes/displays adequate comfort level or baseline comfort level  2/24/2025 0848 by Marcelo Reinoso, RN  Outcome: Progressing  2/23/2025 1957 by Marie Loya, RN  Outcome: Progressing

## 2025-02-24 NOTE — PROGRESS NOTES
[facility] OCCUPATIONAL THERAPY DISCHARGE NOTE  OT Individual Minutes  Time In: 0710  Time Out: 0752  Minutes: 42               GOALS:  Long Term Goals:  Time Frame for Long Term Goals: 21 days   LTG 1: Patient will dress UB with Setup Assistance using AE/DME PRN. [Goal met 2/24/25]  LTG 2: Patient will dress LB with Supervision or Touching Assistance using AE/DME PRN. [Goal met 2/24/25]  LTG 3: Patient will don footwear with Supervision or Touching Assistance using AE/DME PRN. [Goal met 2/24/25]  LTG 4: Patient will bathe with Supervision or Touching Assistance using AE/DME PRN. [Goal met 2/24/25]  LTG 5: Patient will toilet with Supervision or Touching Assistance using AE/DME PRN. [Goal met 2/24/25]  LTG 6: Patient/caregiver will verbalize understanding of OT recommendations regarding ADLs, functional transfers, home safety, AE/DME, energy conservation, safety awareness, activity tolerance, and follow-up therapy to increase safety with functional tasks upon discharge. [Goal met 2/24/25]       Subjective: Patient agreeable to therapy. \"I'd call the fire department [what numbers would you dial] I don't know I have it written at my house\"  Pain: No pain expressed.  Precautions: Falls, Poor Safety Awareness, Talisha Alarm, and R Hemiplegia   Lines:N/A  O2 Device: RA      FUNCTIONAL MOBILITY:        Bed Mobility Supervision    Sit to Stand SBA    Transfer  SBA and CGA  Transfer Type: SPT  Equipment: Grab Bars and straight cane and HHA    Ambulation SBA and CGA  Equipment:  straight cane      ACTIVITIES OF DAILY LIVING:    Initial Score Current Score   Eating Supervision or touching assistance  SBA seated Independent  Mod I seated   Oral Hygiene Supervision or touching assistance  CGA seated see next ADL Setup or clean-up assistance  S/U seated   Shower/Bathe Partial/moderate assistance  Mod-Min A see next ADL Supervision or touching assistance  S/U / Sup-V UB/LB bathing seated and standing at TTB with grab bars, HHSH, LHS, min

## 2025-02-24 NOTE — CARE COORDINATION
CM reviewed / screen patient medical chart for continue stay.  Patient needs are continue to be followed by Dr. Malika Thompson.  Patient has a discharge date / plan at this time scheduled for 02/25/25.  Patient no interested in HH but would like for a referral to be made for outpatient therapy.  Out therapy referral has been completed and  will make contact with patient and schedule appointments.  CM will continue to follow / monitor for any needs, concerns or questions that may arise.

## 2025-02-24 NOTE — PROGRESS NOTES
Stand by Assist  Propulsion: Yes   Propulsion 1  Propulsion: Manual  Level: Level Tile  Method: AWILDA NAGY  Level of Assistance: Stand by assistance  Distance: 90 (ft)       Per OT:   Eating  Assistance Needed: Setup or clean-up assistance  Comment: S/U - Mod I seated  CARE Score: 5   Oral Hygiene  Assistance Needed: Supervision or touching assistance  Comment: Sup-V - S/U oral care and hair care seated at sink w/c level  CARE Score: 4  Shower/Bathe Self  Assistance Needed: Supervision or touching assistance  Comment: SBA/CGA UB/LB bathing seated and standing at TTB with grab bars, HHSH and LHS, incr time req, 1-3 verbal cues for safety  CARE Score: 4  Upper Body Dressing  Assistance Needed: Supervision or touching assistance  Comment: Sup-V doff/don shirt seated, 1-3 verbal cues for completion in sitting for safety 2nd to balance  CARE Score: 4  Lower Body Dressing  Assistance Needed: Supervision or touching assistance  Comment: CGA doff/don underwear and pants seated and standing with grab bars/bedrails/HHA, pt declined use of reacher today, min incr time however cont improved RLE mob for LB dressing  CARE Score: 4  Putting On/Taking Off Footwear  Assistance Needed: Supervision or touching assistance  Comment: CGA doff/don socks, R AFO and shoes (velcro), incr time pt seated in recliner with back support, Advanced replaced straps on R AFO and notable improvement with ease of donning  CARE Score: 4  Toileting Hygiene  Assistance needed: Partial/moderate assistance  Comment: Min A - CGA seated and standing at commode with RW and grab bars  CARE Score: 3  Toilet Transfer  Assistance needed: Supervision or touching assistance  Comment: CGA SPT with straight cane/grab bars  CARE Score: 4        Speech Assessment:  patient demonstrates mild dysarthria and cognitive linguistic deficits. he demonstrates good understanding of strategies to help compensate. needs cues to carryover dysarthria strategies at conversation

## 2025-02-24 NOTE — PROGRESS NOTES
Patient ended session seated in recliner with call bell and needs within reach, with chair/bed alarm activated, with legs elevated, and with Virtual/Physical Sitter.   Plan: Continue OT POC.     HELEN LAZARO, RICKI   2/24/2025

## 2025-02-24 NOTE — PLAN OF CARE
Problem: Safety - Adult  Goal: Free from fall injury  2/23/2025 1957 by Marie Loya RN  Outcome: Progressing  2/23/2025 1125 by Kellen Villalobos RN  Outcome: Progressing     Problem: Chronic Conditions and Co-morbidities  Goal: Patient's chronic conditions and co-morbidity symptoms are monitored and maintained or improved  2/23/2025 1125 by Kellen Villalobos RN  Outcome: Progressing     Problem: Pain  Goal: Verbalizes/displays adequate comfort level or baseline comfort level  2/23/2025 1957 by Marie Loya RN  Outcome: Progressing  2/23/2025 1125 by Kellen Villalobos RN  Outcome: Progressing     Problem: Skin/Tissue Integrity  Goal: Skin integrity remains intact  Description: 1.  Monitor for areas of redness and/or skin breakdown  2.  Assess vascular access sites hourly  3.  Every 4-6 hours minimum:  Change oxygen saturation probe site  4.  Every 4-6 hours:  If on nasal continuous positive airway pressure, respiratory therapy assess nares and determine need for appliance change or resting period  2/23/2025 1957 by Marie Loya, RN  Outcome: Progressing  2/23/2025 1125 by Kellen Villalobos RN  Outcome: Progressing

## 2025-02-25 VITALS
BODY MASS INDEX: 30.53 KG/M2 | HEIGHT: 66 IN | TEMPERATURE: 97.9 F | HEART RATE: 79 BPM | OXYGEN SATURATION: 94 % | RESPIRATION RATE: 18 BRPM | DIASTOLIC BLOOD PRESSURE: 72 MMHG | SYSTOLIC BLOOD PRESSURE: 126 MMHG | WEIGHT: 190 LBS

## 2025-02-25 LAB
GLUCOSE BLD STRIP.AUTO-MCNC: 100 MG/DL (ref 65–100)
GLUCOSE BLD STRIP.AUTO-MCNC: 77 MG/DL (ref 65–100)
SERVICE CMNT-IMP: NORMAL
SERVICE CMNT-IMP: NORMAL

## 2025-02-25 PROCEDURE — 6360000002 HC RX W HCPCS: Performed by: STUDENT IN AN ORGANIZED HEALTH CARE EDUCATION/TRAINING PROGRAM

## 2025-02-25 PROCEDURE — 6370000000 HC RX 637 (ALT 250 FOR IP): Performed by: STUDENT IN AN ORGANIZED HEALTH CARE EDUCATION/TRAINING PROGRAM

## 2025-02-25 PROCEDURE — 99239 HOSP IP/OBS DSCHRG MGMT >30: CPT | Performed by: INTERNAL MEDICINE

## 2025-02-25 PROCEDURE — 97550 CAREGIVER TRAING 1ST 30 MIN: CPT

## 2025-02-25 PROCEDURE — 97116 GAIT TRAINING THERAPY: CPT

## 2025-02-25 PROCEDURE — 82962 GLUCOSE BLOOD TEST: CPT

## 2025-02-25 PROCEDURE — 97530 THERAPEUTIC ACTIVITIES: CPT

## 2025-02-25 RX ADMIN — LISINOPRIL 10 MG: 5 TABLET ORAL at 08:33

## 2025-02-25 RX ADMIN — HYDROCHLOROTHIAZIDE 12.5 MG: 25 TABLET ORAL at 08:30

## 2025-02-25 RX ADMIN — METFORMIN HYDROCHLORIDE 1000 MG: 500 TABLET ORAL at 08:29

## 2025-02-25 RX ADMIN — GLIPIZIDE 2.5 MG: 5 TABLET ORAL at 08:32

## 2025-02-25 RX ADMIN — CLOPIDOGREL BISULFATE 75 MG: 75 TABLET ORAL at 08:31

## 2025-02-25 RX ADMIN — ENOXAPARIN SODIUM 40 MG: 100 INJECTION SUBCUTANEOUS at 08:35

## 2025-02-25 RX ADMIN — CITALOPRAM HYDROBROMIDE 20 MG: 20 TABLET ORAL at 08:31

## 2025-02-25 RX ADMIN — ASPIRIN 81 MG: 81 TABLET, CHEWABLE ORAL at 08:31

## 2025-02-25 NOTE — CARE COORDINATION
Patient with DC orders today. Daughter will be picking him up and taking home.  States no issues with follow ups or picking up medications at discharge. No additional needs made known to RN CM. Patient and family with no questions or concerns. Patient has met all treatment goals and milestones for discharge. Patient to contact CM if any new needs arise.    1300 Update - Dr Carrion requesting patient go home on Holter Monitor, reached on to Ruben Reyes NP to get scheduled.          02/25/25 0510   Services At/After Discharge   Services At/After Discharge Outpatient;PT;OT;SLP    Resource Information Provided? No   Mode of Transport at Discharge Self   Confirm Follow Up Transport Family   Condition of Participation: Discharge Planning   The Plan for Transition of Care is related to the following treatment goals: return to baseline with assistance of OP therapy   The Patient and/or Patient Representative was provided with a Choice of Provider? Patient   The Patient and/Or Patient Representative agree with the Discharge Plan? Yes   Freedom of Choice list was provided with basic dialogue that supports the patient's individualized plan of care/goals, treatment preferences, and shares the quality data associated with the providers?  Yes

## 2025-02-25 NOTE — PROGRESS NOTES
[facility] OCCUPATIONAL THERAPY DAILY NOTE  OT Individual Minutes  Time In: 1032  Time Out: 1050  Minutes: 18               Subjective: Pt agreeable to treatment. \"I could use it like the pegs\"  Pain: No pain expressed.  Interdisciplinary Communication: Collaborated with PT, RN, and SLP regarding pt status, pt performance, handoff communication, and d/c planning.   Precautions: Falls, Poor Safety Awareness, [place] Alarm, and R Hemiplegia   Lines:N/A  O2 Device: RA    FUNCTIONAL MOBILITY:       Bed Mobility NT    Sit to Stand NT    Transfer  NT  Transfer Type: NA  Equipment: NA    Ambulation NT  Equipment: NA       - Family Training   Pt completed self-care BUE function, functional mobility and (I) with ADLs in preparation for safe return to max (I) with ADLs. Pt tolerated self-care well with no c/o pain. Rest breaks utilized as needed throughout..  Pt completed:  Family education; Pt dtr and spouse present for family training. Pt seated in recliner chair. Reviewed with family pt progression since SOC. Reviewed current deficits and barriers. Reviewed GRPOM/AAROM reviewed prior with dtr. KT tape education provided and encouraged family to follow up with therapists given positive response of de-activating forearm flexor group. KT tape removed, skin integrity monitored and intact with no observation or pt c/o irritation/discomfort. Pt spouse asked therapy about continued OT recommendations. Discussed OT recommendation of HHOT for transition home and given continued insight deficits, balance deficits and Sup-V need. Pt family in agreement, family states pt to d/c home with spouse. Pt remains strongly opposed to HHOT. CM notified of family inquiry about HH. All questions/concerns answered at present time.        Education   Family Training     Assessment: Patient tolerated session well overall2. Pt demonstrated good participation in OT treatment. Pt continues to benefit from skilled OT services to address remaining deficits and

## 2025-02-25 NOTE — PROGRESS NOTES
PHYSICAL THERAPY DAILY NOTE    PT Individual Minutes  Time In: 1050  Time Out: 1145  Minutes: 55                 Total Treatment Time:  55 Minutes    Pt seen for: Family Training, Gait Training, and Transfer Training     Subjective: I would rather OPPT         Objective:  Restrictions/Precautions: Fall Risk  Required Braces or Orthoses?: Yes           Right Lower Extremity Brace: Ankle Foot Orthotics  Other Position/Activity Restrictions: -         GROSS ASSESSMENT   Pt up in recliner with family present.        COGNITION Daily Assessment    Overall Orientation Status: Within Normal Limits   Overall Cognitive Status: WNL  Arousal/Alertness: Appears intact  Following Commands: Follows multistep commands with repitition, Follows multistep commands with increased time  Attention Span: Appears intact  Memory: Appears intact  Safety Judgement: Appears intact  Problem Solving: Assistance required to generate solutions  Insights: Fully aware of deficits  Initiation: Requires cues for some  Sequencing: Requires cues for some        BED/MAT MOBILITY Daily Assessment     Bed Mobility Comments: n/a--OOB to chair        TRANSFERS Daily Assessment    Sit to Stand: Stand by assistance;Contact guard assistance (inc assist from low chair)  Stand to Sit: Supervision;Modified independent  Stand Pivot Transfers: Supervision  Car Transfer: Minimal Assistance          GAIT Daily Assessment    Surface: Level tile  Device: Single point cane  Other Apparatus: AFO;Right  Assistance: Stand by assistance  Gait Deviations: Slow Jenni;Decreased step length;Decreased step height;Decreased arm swing  Distance: 100ft, 150ft  Comments: sup for FT with cga x 2 due to abrupt stop. Pt educated not to try to make so many gait corrections at the same time as he loses balance when overthinking.   Ambulation 2  Surface - 2: uneven  Device 2: Single point cane  Other Apparatus 2: AFO;Right  Assistance 2: Stand by assistance;Contact guard

## 2025-02-25 NOTE — PLAN OF CARE
Problem: Safety - Adult  Goal: Free from fall injury  2/24/2025 1938 by Marcy Loya, RN  Outcome: Progressing  2/24/2025 0848 by Marcelo Reinoso, RN  Outcome: Progressing

## 2025-02-25 NOTE — DISCHARGE SUMMARY
known as: CELEXA  Take 1 tablet by mouth daily     clopidogrel 75 MG tablet  Commonly known as: PLAVIX  Take 1 tablet by mouth daily for 19 days     lisinopril-hydroCHLOROthiazide 10-12.5 MG per tablet  Commonly known as: PRINZIDE;ZESTORETIC  Take 1 tablet by mouth daily     metFORMIN 1000 MG tablet  Commonly known as: GLUCOPHAGE  Take 1 tablet by mouth 2 times daily (with meals)     mirtazapine 15 MG tablet  Commonly known as: Remeron  Take 1 tablet by mouth nightly     vitamin D 1.25 MG (19853 UT) Caps capsule  Commonly known as: ERGOCALCIFEROL  Take 1 capsule by mouth once a week               Where to Get Your Medications        Information about where to get these medications is not yet available    Ask your nurse or doctor about these medications  aspirin 81 MG chewable tablet  citalopram 20 MG tablet  clopidogrel 75 MG tablet  glipiZIDE 2.5 MG Tabs              Current Discharge Medication List           Meds reviewed with patient prior to discharge      Time Spent on Discharge:  >31 minutes were spent in patient examination, evaluation, counseling as well as medication reconciliation, prescriptions for required medications, discharge plan, and follow up.    Part of this note may have been written by using a voice dictation software.  The note has been proof read but may still contain some grammatical/other typographical errors.        Malika Carrion, DO     February 25, 2025

## 2025-02-26 ENCOUNTER — CARE COORDINATION (OUTPATIENT)
Dept: CARE COORDINATION | Facility: CLINIC | Age: 75
End: 2025-02-26

## 2025-02-26 NOTE — PROGRESS NOTES
[facility] Neurology [address], [address]  [place] [address]  [phone]      Chief Complaint   Patient presents with    Follow-Up from Hospital       Javid Fernando Sharp is a 74 y.o. male who presents for hospital follow up for stroke.    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 Code S, NIHSS 8. CT of the head was obtained and shows chronic abnormalities only.  CTA of head/neck negative for LVO or high grade stenosis. He was not candidate for TNK or GHULAM. Loaded with asa 325 and clopidogrel 300 mg in ED, started on DAPT. MRI of brain showed Small right frontal lobe acute infarctions; remote left pontine infarct. TTE EF 55-60% negative for LAD or PFO. He was evaluated by therapies, recommendation for acute inpatient rehab. He was discharged on DAPT x 21 days and atrovastatin for secondary stroke prevention.     Interval history:     He is here today with his grand-daughter. RHM. Continue to have spastic hemparesis on right with right foot drop (chronic), memory difficulties, word finding difficulties, intermittent dysphagia  and unsteady gait. Ambulating with cane.  Denies falls, vision changes. He has completed IPR on 2/25/2025 , currently participating in OP therapies. 30 day cardiac event monitor in progress. He requires moderate assistance with his ADLs. He is currently on DAPT and atorvastatin 80 mg daily for secondary stroke prevention. Tolerating medication without side effects.     Hx depression/anxiety- stable. Currently on citalopram. Denies SI.     Denies snoring, daytime somnolence or apnea. No hx of sleep apnea or prior sleep study. Hx of insomnia, well managed on mirtazapine.     Denies tobacco use, alcohol use or recreational drug use.      Past Medical History:   Diagnosis Date    Diabetes (HCC)     Stroke

## 2025-02-26 NOTE — CARE COORDINATION
Care Transitions Note    Initial Call - Call within 2 business days of discharge: Yes    Attempted to reach patient for transitions of care follow up. Unable to reach patient.    Outreach Attempts:   HIPAA compliant voicemail left for patient.     Patient: Javid Sharp  Patient : 1950   MRN: [id number]    Reason for Admission: Acute CVA (cerebrovascular accident)  Discharge Date: 25  RURS: Readmission Risk Score: 11.2    Last Discharge Facility       Date Complaint Diagnosis Description Type Department Provider    25  Acute CVA (cerebrovascular accident) ([facility]) ... Admission (Discharged) [id number] Thu Carrasquillo, DO        Was this an external facility discharge? No    Follow Up Appointment:   Patient has hospital follow up appointment scheduled within 7 days of discharge.    Future Appointments         Provider Specialty Dept Phone    2025 3:30 PM [facility] Cardiology [phone]    2025 3:00 PM Lorena Santoro APRN Neurology [phone]    3/7/2025 11:30 AM Jorge Luis Olvera MD Cardiology [phone]    3/26/2025 3:45 PM Bjorn Birmingham MD Family Medicine [phone]            Plan for follow-up on next business day.      Meagan Johnson RN

## 2025-02-27 ENCOUNTER — CARE COORDINATION (OUTPATIENT)
Dept: CARE COORDINATION | Facility: CLINIC | Age: 75
End: 2025-02-27

## 2025-02-27 ENCOUNTER — OFFICE VISIT (OUTPATIENT)
Dept: NEUROLOGY | Age: 75
End: 2025-02-27

## 2025-02-27 DIAGNOSIS — I10 HTN, GOAL BELOW 130/80: ICD-10-CM

## 2025-02-27 DIAGNOSIS — I69.319 COGNITIVE DEFICIT AS LATE EFFECT OF CEREBROVASCULAR ACCIDENT (CVA): ICD-10-CM

## 2025-02-27 DIAGNOSIS — I69.959 SPASTIC HEMIPARESIS AS LATE EFFECT OF CEREBROVASCULAR DISEASE (HCC): ICD-10-CM

## 2025-02-27 DIAGNOSIS — F06.31 DEPRESSION AS LATE EFFECT OF CEREBROVASCULAR ACCIDENT (CVA): ICD-10-CM

## 2025-02-27 DIAGNOSIS — E78.5 HYPERLIPIDEMIA LDL GOAL <70: ICD-10-CM

## 2025-02-27 DIAGNOSIS — Z86.73 HISTORY OF ISCHEMIC VERTEBROBASILAR ARTERY BRAINSTEM STROKE: ICD-10-CM

## 2025-02-27 DIAGNOSIS — Z09 HOSPITAL DISCHARGE FOLLOW-UP: Primary | ICD-10-CM

## 2025-02-27 DIAGNOSIS — I63.9 ISCHEMIC STROKE OF FRONTAL LOBE (HCC): ICD-10-CM

## 2025-02-27 DIAGNOSIS — E11.65 UNCONTROLLED TYPE 2 DIABETES MELLITUS WITH HYPERGLYCEMIA, WITHOUT LONG-TERM CURRENT USE OF INSULIN (HCC): ICD-10-CM

## 2025-02-27 DIAGNOSIS — I69.391 DYSPHAGIA AS LATE EFFECT OF CEREBROVASCULAR ACCIDENT (CVA): ICD-10-CM

## 2025-02-27 DIAGNOSIS — M21.371 FOOT DROP, RIGHT: ICD-10-CM

## 2025-02-27 DIAGNOSIS — I69.398 DEPRESSION AS LATE EFFECT OF CEREBROVASCULAR ACCIDENT (CVA): ICD-10-CM

## 2025-02-27 RX ORDER — BACLOFEN 5 MG/1
5 TABLET ORAL 3 TIMES DAILY
Qty: 90 TABLET | Refills: 1 | Status: SHIPPED | OUTPATIENT
Start: 2025-02-27

## 2025-02-27 ASSESSMENT — PATIENT HEALTH QUESTIONNAIRE - PHQ9
SUM OF ALL RESPONSES TO PHQ QUESTIONS 1-9: 0
SUM OF ALL RESPONSES TO PHQ9 QUESTIONS 1 & 2: 0
SUM OF ALL RESPONSES TO PHQ QUESTIONS 1-9: 0
2. FEELING DOWN, DEPRESSED OR HOPELESS: NOT AT ALL
SUM OF ALL RESPONSES TO PHQ QUESTIONS 1-9: 0
1. LITTLE INTEREST OR PLEASURE IN DOING THINGS: NOT AT ALL
SUM OF ALL RESPONSES TO PHQ QUESTIONS 1-9: 0

## 2025-02-27 NOTE — CARE COORDINATION
Care Transitions Note    Initial Call - Call within 2 business days of discharge: Yes    Patient Current Location:  Home: [address] [place] [id number]    Care Transition Nurse contacted the patient by telephone to perform post hospital discharge assessment, verified name and  as identifiers. Provided introduction to self, and explanation of the Care Transition Nurse role.     Patient: Javid Sharp  Patient : 1950   MRN: [id number]    Reason for Admission: Acute CVA   Discharge Date: 25  RURS: Readmission Risk Score: 11.2      Last Discharge Facility       Date Complaint Diagnosis Description Type Department Provider    25  Acute CVA (cerebrovascular accident) ([facility]) ... Admission (Discharged) [id number] Thu Carrasquillo, DO            Was this an external facility discharge? No    Additional needs identified to be addressed with provider   No needs identified             Method of communication with provider: none.    Patients top risk factors for readmission: medical condition-Acute CVA, HTN, HLD, Hemiplegia, DM, CRD    Interventions to address risk factors:   Patient reports he is doing well since discharge. Patient states he went for holter monitor yesterday and is wearing.   Reviewed discharge instructions and offered opportunity to ask any questions regarding discharge instructions.  Confirmed medications obtained and taking as ordered  CTN encouraged self-monitoring and when to seek care. Patient reports he is able to monitor his FSBS and BP at home.  Patient is aware of upcoming appointments and plans to attend. Patient declines HH. Patient reports good support at home.     Care Transition Nurse reviewed discharge instructions, medical action plan, and red flags with patient. The patient was given an opportunity to ask questions; all questions answered at this time.. The patient verbalized understanding.   Were discharge instructions available to patient? Yes.   Reviewed

## 2025-02-28 VITALS
WEIGHT: 199.6 LBS | BODY MASS INDEX: 30.25 KG/M2 | HEART RATE: 77 BPM | HEIGHT: 68 IN | DIASTOLIC BLOOD PRESSURE: 75 MMHG | SYSTOLIC BLOOD PRESSURE: 119 MMHG | OXYGEN SATURATION: 91 %

## 2025-02-28 ASSESSMENT — ENCOUNTER SYMPTOMS
GASTROINTESTINAL NEGATIVE: 1
ALLERGIC/IMMUNOLOGIC NEGATIVE: 1
RESPIRATORY NEGATIVE: 1
TROUBLE SWALLOWING: 1
EYES NEGATIVE: 1

## 2025-03-06 ENCOUNTER — CARE COORDINATION (OUTPATIENT)
Dept: CARE COORDINATION | Facility: CLINIC | Age: 75
End: 2025-03-06

## 2025-03-06 NOTE — CARE COORDINATION
Provider Specialty Dept Phone    3/7/2025 11:30 AM Jorge Luis Olvera MD Cardiology [phone]    3/26/2025 3:45 PM Bjorn Birmingham MD Family Medicine [phone]    5/27/2025 2:40 PM Lorena Santoro APRN Neurology [phone]            LPN Care Coordinator provided contact information.  Plan for follow-up call in 6-10 days based on severity of symptoms and risk factors.  Plan for next call: follow-up appointment-assess attendance and continued compliance with plan of care.      Bryanna Xavier LPN

## 2025-03-07 ENCOUNTER — OFFICE VISIT (OUTPATIENT)
Age: 75
End: 2025-03-07
Payer: MEDICARE

## 2025-03-07 VITALS
DIASTOLIC BLOOD PRESSURE: 70 MMHG | HEART RATE: 76 BPM | SYSTOLIC BLOOD PRESSURE: 136 MMHG | WEIGHT: 203.6 LBS | HEIGHT: 68 IN | BODY MASS INDEX: 30.86 KG/M2

## 2025-03-07 DIAGNOSIS — I73.9 CLAUDICATION: ICD-10-CM

## 2025-03-07 DIAGNOSIS — I63.9 CRYPTOGENIC STROKE (HCC): Primary | ICD-10-CM

## 2025-03-07 PROCEDURE — 99204 OFFICE O/P NEW MOD 45 MIN: CPT | Performed by: INTERNAL MEDICINE

## 2025-03-07 PROCEDURE — 1159F MED LIST DOCD IN RCRD: CPT | Performed by: INTERNAL MEDICINE

## 2025-03-07 PROCEDURE — 3078F DIAST BP <80 MM HG: CPT | Performed by: INTERNAL MEDICINE

## 2025-03-07 PROCEDURE — 3017F COLORECTAL CA SCREEN DOC REV: CPT | Performed by: INTERNAL MEDICINE

## 2025-03-07 PROCEDURE — G8427 DOCREV CUR MEDS BY ELIG CLIN: HCPCS | Performed by: INTERNAL MEDICINE

## 2025-03-07 PROCEDURE — 1126F AMNT PAIN NOTED NONE PRSNT: CPT | Performed by: INTERNAL MEDICINE

## 2025-03-07 PROCEDURE — 1036F TOBACCO NON-USER: CPT | Performed by: INTERNAL MEDICINE

## 2025-03-07 PROCEDURE — 1123F ACP DISCUSS/DSCN MKR DOCD: CPT | Performed by: INTERNAL MEDICINE

## 2025-03-07 PROCEDURE — 3075F SYST BP GE 130 - 139MM HG: CPT | Performed by: INTERNAL MEDICINE

## 2025-03-07 PROCEDURE — G8417 CALC BMI ABV UP PARAM F/U: HCPCS | Performed by: INTERNAL MEDICINE

## 2025-03-07 PROCEDURE — 1111F DSCHRG MED/CURRENT MED MERGE: CPT | Performed by: INTERNAL MEDICINE

## 2025-03-07 PROCEDURE — 1160F RVW MEDS BY RX/DR IN RCRD: CPT | Performed by: INTERNAL MEDICINE

## 2025-03-07 NOTE — PROGRESS NOTES
CARDIOLOGY CLINIC FOLLOW-UP    Date: 3/7/2025  Patient's Primary Care Physician:  Bjorn Birmingham MD  Referring Physician:  No ref. provider found     Subjective     Reason for Visit: Cryptogenic stroke    History of Present Illness   Mr. Sharp is a 74 y.o. male with past medical history of cerebrovascular accident complicated by right hemiparesis and dysphagia, type 2 diabetes, hypertension, hyperlipidemia, who was recently hospitalized for acute right frontal lobe infarcts in addition to the remote left pontine infarcts suffered previously.  He underwent essentially normal echocardiography during that hospitalization.  A 30-day event monitor was placed, and he presents to establish care for cryptogenic stroke. He denies chest pain, shortness of breath, palpitations.  He reports cramping pain in his left calf when walking intermediate distances.  He has no complaints and is doing well.     Review of Systems   Cardiovascular review of systems negative accept as above.    Home Medications  Prior to Admission medications    Medication Sig Start Date End Date Taking? Authorizing Provider   Baclofen (LIORESAL) 5 MG tablet Take 1 tablet by mouth 3 times daily 2/27/25  Yes Lorena Santoro APRN   aspirin 81 MG chewable tablet Take 1 tablet by mouth daily 2/25/25  Yes Malika Carrion DO   clopidogrel (PLAVIX) 75 MG tablet Take 1 tablet by mouth daily for 19 days 2/24/25 3/15/25 Yes Malika Carrion DO   citalopram (CELEXA) 20 MG tablet Take 1 tablet by mouth daily 2/24/25 8/23/25 Yes Malika Carrion DO   glipiZIDE 2.5 MG TABS Take 2.5 mg by mouth every morning (before breakfast) 2/25/25  Yes Malika Carrion DO   metFORMIN (GLUCOPHAGE) 1000 MG tablet Take 1 tablet by mouth 2 times daily (with meals) 2/12/25 4/13/25 Yes Wilman Brown MD   atorvastatin (LIPITOR) 80 MG tablet Take 1 tablet by mouth daily 11/26/24 5/25/25 Yes Bjorn Birmingham MD   lisinopril-hydroCHLOROthiazide

## 2025-03-07 NOTE — PATIENT INSTRUCTIONS
- Continue wearing your heart monitor. If no Afib is uncovered, we will send you for loop recorder implantation.  - Ultrasound of the legs  - No medication changes today

## 2025-03-13 ENCOUNTER — CARE COORDINATION (OUTPATIENT)
Dept: CARE COORDINATION | Facility: CLINIC | Age: 75
End: 2025-03-13

## 2025-03-13 NOTE — CARE COORDINATION
Care Transitions Note    Final Call     Attempted to reach patient for transitions of care follow up.  Unable to reach patient.      Outreach Attempts:   HIPAA compliant voicemail left for patient.     Patient graduated from the Care Transitions program on 3.13.25.  Patient/family has the ability to self manage at this time..      Handoff:   Patient was not referred to the ACM team due to no additional needs identified.       Care Summary Note: per chart review patient has followed plan of care and attends all follow up as scheduled.  No new needs are noted.        Upcoming Appointments:    Future Appointments         Provider Specialty Dept Phone    3/26/2025 3:45 PM Bjorn Birmingham MD Family Medicine [phone]    3/28/2025 1:30 PM [facility] Cardiology [phone]    5/27/2025 2:40 PM Lorena Santoro APRN Neurology [phone]    9/11/2025 11:00 AM Jorge Luis Olvera MD Cardiology [phone]            Bryanna Xavier LPN

## 2025-03-18 ENCOUNTER — RESULTS FOLLOW-UP (OUTPATIENT)
Age: 75
End: 2025-03-18

## 2025-03-22 DIAGNOSIS — I69.959 SPASTIC HEMIPARESIS AS LATE EFFECT OF CEREBROVASCULAR DISEASE (HCC): ICD-10-CM

## 2025-03-26 ENCOUNTER — OFFICE VISIT (OUTPATIENT)
Dept: FAMILY MEDICINE CLINIC | Facility: CLINIC | Age: 75
End: 2025-03-26
Payer: MEDICARE

## 2025-03-26 VITALS
HEIGHT: 68 IN | WEIGHT: 199 LBS | SYSTOLIC BLOOD PRESSURE: 122 MMHG | BODY MASS INDEX: 30.16 KG/M2 | DIASTOLIC BLOOD PRESSURE: 70 MMHG

## 2025-03-26 DIAGNOSIS — E11.65 TYPE 2 DIABETES MELLITUS WITH HYPERGLYCEMIA, WITHOUT LONG-TERM CURRENT USE OF INSULIN (HCC): ICD-10-CM

## 2025-03-26 DIAGNOSIS — R29.6 FALLS: ICD-10-CM

## 2025-03-26 DIAGNOSIS — Z00.00 MEDICARE ANNUAL WELLNESS VISIT, SUBSEQUENT: ICD-10-CM

## 2025-03-26 DIAGNOSIS — Z79.4 UNCONTROLLED TYPE 2 DIABETES MELLITUS WITH HYPEROSMOLARITY WITHOUT COMA, WITH LONG-TERM CURRENT USE OF INSULIN (HCC): Primary | ICD-10-CM

## 2025-03-26 DIAGNOSIS — Z00.00 ROUTINE GENERAL MEDICAL EXAMINATION AT A HEALTH CARE FACILITY: ICD-10-CM

## 2025-03-26 DIAGNOSIS — I69.351 HEMIPLEGIA AND HEMIPARESIS FOLLOWING CEREBRAL INFARCTION AFFECTING RIGHT DOMINANT SIDE (HCC): ICD-10-CM

## 2025-03-26 DIAGNOSIS — E83.42 HYPOMAGNESEMIA: ICD-10-CM

## 2025-03-26 DIAGNOSIS — R26.81 UNSTEADY GAIT: ICD-10-CM

## 2025-03-26 DIAGNOSIS — R53.82 CHRONIC FATIGUE: ICD-10-CM

## 2025-03-26 DIAGNOSIS — E11.00 UNCONTROLLED TYPE 2 DIABETES MELLITUS WITH HYPEROSMOLARITY WITHOUT COMA, WITH LONG-TERM CURRENT USE OF INSULIN (HCC): Primary | ICD-10-CM

## 2025-03-26 DIAGNOSIS — Z86.73 HISTORY OF STROKE: ICD-10-CM

## 2025-03-26 DIAGNOSIS — N18.31 STAGE 3A CHRONIC KIDNEY DISEASE (HCC): ICD-10-CM

## 2025-03-26 DIAGNOSIS — F32.0 CURRENT MILD EPISODE OF MAJOR DEPRESSIVE DISORDER WITHOUT PRIOR EPISODE: ICD-10-CM

## 2025-03-26 DIAGNOSIS — I10 ESSENTIAL HYPERTENSION: ICD-10-CM

## 2025-03-26 DIAGNOSIS — E78.2 MIXED HYPERLIPIDEMIA: ICD-10-CM

## 2025-03-26 DIAGNOSIS — F41.9 ANXIETY: ICD-10-CM

## 2025-03-26 DIAGNOSIS — E55.9 VITAMIN D DEFICIENCY: ICD-10-CM

## 2025-03-26 LAB
25(OH)D3 SERPL-MCNC: 82.6 NG/ML (ref 30–100)
ALBUMIN SERPL-MCNC: 3.7 G/DL (ref 3.2–4.6)
ALBUMIN/GLOB SERPL: 1.1 (ref 1–1.9)
ALP SERPL-CCNC: 66 U/L (ref 40–129)
ALT SERPL-CCNC: 34 U/L (ref 8–55)
ANION GAP SERPL CALC-SCNC: 12 MMOL/L (ref 7–16)
AST SERPL-CCNC: 29 U/L (ref 15–37)
BASOPHILS # BLD: 0.13 K/UL (ref 0–0.2)
BASOPHILS NFR BLD: 1.3 % (ref 0–2)
BILIRUB SERPL-MCNC: 0.2 MG/DL (ref 0–1.2)
BUN SERPL-MCNC: 16 MG/DL (ref 8–23)
CALCIUM SERPL-MCNC: 9.7 MG/DL (ref 8.8–10.2)
CHLORIDE SERPL-SCNC: 100 MMOL/L (ref 98–107)
CHOLEST SERPL-MCNC: 112 MG/DL (ref 0–200)
CO2 SERPL-SCNC: 28 MMOL/L (ref 20–29)
CREAT SERPL-MCNC: 1.55 MG/DL (ref 0.8–1.3)
DIFFERENTIAL METHOD BLD: ABNORMAL
EOSINOPHIL # BLD: 0.49 K/UL (ref 0–0.8)
EOSINOPHIL NFR BLD: 5 % (ref 0.5–7.8)
ERYTHROCYTE [DISTWIDTH] IN BLOOD BY AUTOMATED COUNT: 13.4 % (ref 11.9–14.6)
EST. AVERAGE GLUCOSE BLD GHB EST-MCNC: 183 MG/DL
GLOBULIN SER CALC-MCNC: 3.3 G/DL (ref 2.3–3.5)
GLUCOSE SERPL-MCNC: 85 MG/DL (ref 70–99)
GLUCOSE, POC: 94 MG/DL
HBA1C MFR BLD: 8 % (ref 0–5.6)
HCT VFR BLD AUTO: 42.5 % (ref 41.1–50.3)
HDLC SERPL-MCNC: 32 MG/DL (ref 40–60)
HDLC SERPL: 3.5 (ref 0–5)
HGB BLD-MCNC: 14.2 G/DL (ref 13.6–17.2)
IMM GRANULOCYTES # BLD AUTO: 0.03 K/UL (ref 0–0.5)
IMM GRANULOCYTES NFR BLD AUTO: 0.3 % (ref 0–5)
LDLC SERPL CALC-MCNC: 55 MG/DL (ref 0–100)
LYMPHOCYTES # BLD: 2.96 K/UL (ref 0.5–4.6)
LYMPHOCYTES NFR BLD: 30 % (ref 13–44)
MAGNESIUM SERPL-MCNC: 1.6 MG/DL (ref 1.8–2.4)
MCH RBC QN AUTO: 30.8 PG (ref 26.1–32.9)
MCHC RBC AUTO-ENTMCNC: 33.4 G/DL (ref 31.4–35)
MCV RBC AUTO: 92.2 FL (ref 82–102)
MONOCYTES # BLD: 1.05 K/UL (ref 0.1–1.3)
MONOCYTES NFR BLD: 10.6 % (ref 4–12)
NEUTS SEG # BLD: 5.22 K/UL (ref 1.7–8.2)
NEUTS SEG NFR BLD: 52.8 % (ref 43–78)
NRBC # BLD: 0 K/UL (ref 0–0.2)
PLATELET # BLD AUTO: 315 K/UL (ref 150–450)
PMV BLD AUTO: 9.1 FL (ref 9.4–12.3)
POTASSIUM SERPL-SCNC: 4.2 MMOL/L (ref 3.5–5.1)
PROT SERPL-MCNC: 7 G/DL (ref 6.3–8.2)
RBC # BLD AUTO: 4.61 M/UL (ref 4.23–5.6)
SODIUM SERPL-SCNC: 140 MMOL/L (ref 136–145)
TRIGL SERPL-MCNC: 127 MG/DL (ref 0–150)
TSH, 3RD GENERATION: 3.12 UIU/ML (ref 0.27–4.2)
VLDLC SERPL CALC-MCNC: 25 MG/DL (ref 6–23)
WBC # BLD AUTO: 9.9 K/UL (ref 4.3–11.1)

## 2025-03-26 PROCEDURE — 1111F DSCHRG MED/CURRENT MED MERGE: CPT | Performed by: FAMILY MEDICINE

## 2025-03-26 PROCEDURE — 3074F SYST BP LT 130 MM HG: CPT | Performed by: FAMILY MEDICINE

## 2025-03-26 PROCEDURE — 3046F HEMOGLOBIN A1C LEVEL >9.0%: CPT | Performed by: FAMILY MEDICINE

## 2025-03-26 PROCEDURE — G0439 PPPS, SUBSEQ VISIT: HCPCS | Performed by: FAMILY MEDICINE

## 2025-03-26 PROCEDURE — 82962 GLUCOSE BLOOD TEST: CPT | Performed by: FAMILY MEDICINE

## 2025-03-26 PROCEDURE — 1036F TOBACCO NON-USER: CPT | Performed by: FAMILY MEDICINE

## 2025-03-26 PROCEDURE — G8427 DOCREV CUR MEDS BY ELIG CLIN: HCPCS | Performed by: FAMILY MEDICINE

## 2025-03-26 PROCEDURE — 99214 OFFICE O/P EST MOD 30 MIN: CPT | Performed by: FAMILY MEDICINE

## 2025-03-26 PROCEDURE — 3017F COLORECTAL CA SCREEN DOC REV: CPT | Performed by: FAMILY MEDICINE

## 2025-03-26 PROCEDURE — 1159F MED LIST DOCD IN RCRD: CPT | Performed by: FAMILY MEDICINE

## 2025-03-26 PROCEDURE — 3078F DIAST BP <80 MM HG: CPT | Performed by: FAMILY MEDICINE

## 2025-03-26 PROCEDURE — 1160F RVW MEDS BY RX/DR IN RCRD: CPT | Performed by: FAMILY MEDICINE

## 2025-03-26 PROCEDURE — 2022F DILAT RTA XM EVC RTNOPTHY: CPT | Performed by: FAMILY MEDICINE

## 2025-03-26 PROCEDURE — G8417 CALC BMI ABV UP PARAM F/U: HCPCS | Performed by: FAMILY MEDICINE

## 2025-03-26 PROCEDURE — G2211 COMPLEX E/M VISIT ADD ON: HCPCS | Performed by: FAMILY MEDICINE

## 2025-03-26 PROCEDURE — 1123F ACP DISCUSS/DSCN MKR DOCD: CPT | Performed by: FAMILY MEDICINE

## 2025-03-26 ASSESSMENT — ENCOUNTER SYMPTOMS
CONSTIPATION: 0
RESPIRATORY NEGATIVE: 1
BLOOD IN STOOL: 0
BLURRED VISION: 0
ABDOMINAL DISTENTION: 0
ORTHOPNEA: 0
ANAL BLEEDING: 0
ABDOMINAL PAIN: 0

## 2025-03-26 ASSESSMENT — PATIENT HEALTH QUESTIONNAIRE - PHQ9
6. FEELING BAD ABOUT YOURSELF - OR THAT YOU ARE A FAILURE OR HAVE LET YOURSELF OR YOUR FAMILY DOWN: NOT AT ALL
1. LITTLE INTEREST OR PLEASURE IN DOING THINGS: NOT AT ALL
2. FEELING DOWN, DEPRESSED OR HOPELESS: NOT AT ALL
SUM OF ALL RESPONSES TO PHQ QUESTIONS 1-9: 0
5. POOR APPETITE OR OVEREATING: NOT AT ALL
4. FEELING TIRED OR HAVING LITTLE ENERGY: NOT AT ALL
7. TROUBLE CONCENTRATING ON THINGS, SUCH AS READING THE NEWSPAPER OR WATCHING TELEVISION: NOT AT ALL
8. MOVING OR SPEAKING SO SLOWLY THAT OTHER PEOPLE COULD HAVE NOTICED. OR THE OPPOSITE, BEING SO FIGETY OR RESTLESS THAT YOU HAVE BEEN MOVING AROUND A LOT MORE THAN USUAL: NOT AT ALL
9. THOUGHTS THAT YOU WOULD BE BETTER OFF DEAD, OR OF HURTING YOURSELF: NOT AT ALL
SUM OF ALL RESPONSES TO PHQ QUESTIONS 1-9: 0
3. TROUBLE FALLING OR STAYING ASLEEP: NOT AT ALL
SUM OF ALL RESPONSES TO PHQ QUESTIONS 1-9: 0
SUM OF ALL RESPONSES TO PHQ QUESTIONS 1-9: 0
10. IF YOU CHECKED OFF ANY PROBLEMS, HOW DIFFICULT HAVE THESE PROBLEMS MADE IT FOR YOU TO DO YOUR WORK, TAKE CARE OF THINGS AT HOME, OR GET ALONG WITH OTHER PEOPLE: NOT DIFFICULT AT ALL

## 2025-03-26 NOTE — PROGRESS NOTES
Medicare Annual Wellness Visit    Javid Portertavia Sharp is here for Medicare AWV, Diabetes (BS 94 today in office), and Cholesterol Problem    Assessment & Plan   Uncontrolled type 2 diabetes mellitus with hyperosmolarity without coma, with long-term current use of insulin (HCC)  -     AMB POC GLUCOSE BLOOD, BY GLUCOSE MONITORING DEVICE  -     Hemoglobin A1C; Future  Unsteady gait  -     Amb External Referral To Physical Therapy  Falls  -     Amb External Referral To Physical Therapy  Mixed hyperlipidemia  -     Lipid Panel; Future  Current mild episode of major depressive disorder without prior episode  Essential hypertension  -     CBC with Auto Differential; Future  -     Comprehensive Metabolic Panel; Future  Anxiety  Vitamin D deficiency  -     Vitamin D 25 Hydroxy; Future  Hypomagnesemia  -     Magnesium; Future  Hemiplegia and hemiparesis following cerebral infarction affecting right dominant side (HCC)  History of stroke  Stage 3a chronic kidney disease (HCC)  Type 2 diabetes mellitus with hyperglycemia, without long-term current use of insulin (formerly [facility])  Chronic fatigue  -     TSH; Future       No follow-ups on file.     Subjective   The following acute and/or chronic problems were also addressed today:  Pt with unsteady gait and fall several times last week or so, will refer to PT for eval and treat for gait stabilization and fall prevention.      Patient's complete Health Risk Assessment and screening values have been reviewed and are found in Flowsheets. The following problems were reviewed today and where indicated follow up appointments were made and/or referrals ordered.    Positive Risk Factor Screenings with Interventions:    Fall Risk:  Do you feel unsteady or are you worried about falling? : (!) yes  2 or more falls in past year?: (!) yes  Fall with injury in past year?: no     Interventions:    Reviewed medications, home hazards, visual acuity, and co-morbidities that can increase risk for falls  Patient

## 2025-03-26 NOTE — PROGRESS NOTES
[facility]  _______________________________________  Bjorn Birmingham MD                 [address] [place] [address]        Nas[place] [address]                                                                                    Phone: [phone]                                                                                    Fax: [phone]    Javid Sharp is a 74 y.o. male who is seen for evaluation of   Chief Complaint   Patient presents with   • Medicare AWV   • Diabetes     BS 94 today in office   • Cholesterol Problem       HPI:   Diabetes  He presents for his follow-up diabetic visit. He has type 2 diabetes mellitus. His disease course has been stable. Hypoglycemia symptoms include dizziness. Associated symptoms include fatigue. Pertinent negatives for diabetes include no blurred vision, no chest pain and no foot paresthesias. (on meds, need refills and labs, no side effect noted.   )   Fall  Fall occurred: fall at home last week or so , 3 times, scratched up arm on one occasion, no head injury noted. Pertinent negatives include no abdominal pain.   Neurologic Problem  Chronicity: prior stroke with hemiparesis on right, unchanged from baseline. Associated symptoms include dizziness and fatigue. Pertinent negatives include no abdominal pain, chest pain, diaphoresis, neck pain or palpitations.   Hyperlipidemia  Episode onset: on meds, no roberto eeffect noted. Pertinent negatives include no chest pain.   Hypertension  This is a chronic problem. The current episode started more than 1 year ago. The problem is unchanged. The problem is controlled. Pertinent negatives include no blurred vision, chest pain, malaise/fatigue, neck pain, orthopnea or palpitations. (on meds, need refills and labs, no side effect noted.   )   Depression  Visit Type: follow-up  Patient is not experiencing: palpitations.  Episode frequency: stable on meds, no side effect noted,

## 2025-03-27 ENCOUNTER — RESULTS FOLLOW-UP (OUTPATIENT)
Dept: FAMILY MEDICINE CLINIC | Facility: CLINIC | Age: 75
End: 2025-03-27

## 2025-04-11 RX ORDER — BACLOFEN 5 MG/1
5 TABLET ORAL 3 TIMES DAILY
Qty: 270 TABLET | Refills: 1 | OUTPATIENT
Start: 2025-04-11

## 2025-04-24 DIAGNOSIS — I69.959 SPASTIC HEMIPARESIS AS LATE EFFECT OF CEREBROVASCULAR DISEASE (HCC): ICD-10-CM

## 2025-04-30 DIAGNOSIS — E78.2 MIXED HYPERLIPIDEMIA: ICD-10-CM

## 2025-04-30 DIAGNOSIS — F51.01 PRIMARY INSOMNIA: ICD-10-CM

## 2025-04-30 DIAGNOSIS — E11.00 UNCONTROLLED TYPE 2 DIABETES MELLITUS WITH HYPEROSMOLARITY WITHOUT COMA, WITH LONG-TERM CURRENT USE OF INSULIN (HCC): ICD-10-CM

## 2025-04-30 DIAGNOSIS — Z79.4 UNCONTROLLED TYPE 2 DIABETES MELLITUS WITH HYPEROSMOLARITY WITHOUT COMA, WITH LONG-TERM CURRENT USE OF INSULIN (HCC): ICD-10-CM

## 2025-04-30 DIAGNOSIS — I10 ESSENTIAL HYPERTENSION: ICD-10-CM

## 2025-04-30 DIAGNOSIS — F41.9 ANXIETY: ICD-10-CM

## 2025-04-30 DIAGNOSIS — F32.0 CURRENT MILD EPISODE OF MAJOR DEPRESSIVE DISORDER WITHOUT PRIOR EPISODE: ICD-10-CM

## 2025-04-30 DIAGNOSIS — E55.9 VITAMIN D DEFICIENCY: ICD-10-CM

## 2025-04-30 RX ORDER — ERGOCALCIFEROL 1.25 MG/1
50000 CAPSULE, LIQUID FILLED ORAL WEEKLY
Qty: 12 CAPSULE | Refills: 1 | Status: SHIPPED | OUTPATIENT
Start: 2025-04-30 | End: 2025-10-15

## 2025-04-30 RX ORDER — GLIPIZIDE 2.5 MG/1
2.5 TABLET ORAL
Qty: 60 TABLET | Refills: 3 | Status: SHIPPED | OUTPATIENT
Start: 2025-04-30

## 2025-04-30 RX ORDER — CITALOPRAM HYDROBROMIDE 20 MG/1
20 TABLET ORAL DAILY
Qty: 90 TABLET | Refills: 1 | Status: SHIPPED | OUTPATIENT
Start: 2025-04-30 | End: 2025-10-27

## 2025-04-30 RX ORDER — CLOPIDOGREL BISULFATE 75 MG/1
75 TABLET ORAL DAILY
Qty: 19 TABLET | Refills: 0 | Status: SHIPPED | OUTPATIENT
Start: 2025-04-30 | End: 2025-05-19

## 2025-04-30 RX ORDER — MIRTAZAPINE 15 MG/1
15 TABLET, FILM COATED ORAL NIGHTLY
Qty: 90 TABLET | Refills: 1 | Status: SHIPPED | OUTPATIENT
Start: 2025-04-30 | End: 2025-10-27

## 2025-04-30 RX ORDER — LISINOPRIL AND HYDROCHLOROTHIAZIDE 10; 12.5 MG/1; MG/1
1 TABLET ORAL DAILY
Qty: 90 TABLET | Refills: 1 | Status: SHIPPED | OUTPATIENT
Start: 2025-04-30 | End: 2025-10-27

## 2025-04-30 RX ORDER — ATORVASTATIN CALCIUM 80 MG/1
80 TABLET, FILM COATED ORAL DAILY
Qty: 90 TABLET | Refills: 1 | Status: SHIPPED | OUTPATIENT
Start: 2025-04-30 | End: 2025-10-27

## 2025-05-19 RX ORDER — BACLOFEN 5 MG/1
5 TABLET ORAL 3 TIMES DAILY
Qty: 90 TABLET | Refills: 1 | OUTPATIENT
Start: 2025-05-19

## 2025-07-28 ENCOUNTER — OFFICE VISIT (OUTPATIENT)
Dept: FAMILY MEDICINE CLINIC | Facility: CLINIC | Age: 75
End: 2025-07-28

## 2025-07-28 VITALS — BODY MASS INDEX: 31.07 KG/M2 | WEIGHT: 205 LBS | HEIGHT: 68 IN

## 2025-07-28 DIAGNOSIS — E11.00 UNCONTROLLED TYPE 2 DIABETES MELLITUS WITH HYPEROSMOLARITY WITHOUT COMA, WITH LONG-TERM CURRENT USE OF INSULIN (HCC): ICD-10-CM

## 2025-07-28 DIAGNOSIS — Z79.4 UNCONTROLLED TYPE 2 DIABETES MELLITUS WITH HYPEROSMOLARITY WITHOUT COMA, WITH LONG-TERM CURRENT USE OF INSULIN (HCC): ICD-10-CM

## 2025-07-28 DIAGNOSIS — I10 ESSENTIAL HYPERTENSION: Primary | ICD-10-CM

## 2025-07-28 DIAGNOSIS — F32.0 CURRENT MILD EPISODE OF MAJOR DEPRESSIVE DISORDER WITHOUT PRIOR EPISODE: ICD-10-CM

## 2025-07-28 DIAGNOSIS — E78.2 MIXED HYPERLIPIDEMIA: ICD-10-CM

## 2025-07-28 DIAGNOSIS — F51.01 PRIMARY INSOMNIA: ICD-10-CM

## 2025-07-28 DIAGNOSIS — F41.9 ANXIETY: ICD-10-CM

## 2025-07-28 DIAGNOSIS — R26.81 UNSTEADY GAIT: ICD-10-CM

## 2025-07-28 LAB
ALBUMIN SERPL-MCNC: 3.7 G/DL (ref 3.2–4.6)
ALBUMIN/GLOB SERPL: 1.3 (ref 1–1.9)
ALP SERPL-CCNC: 64 U/L (ref 40–129)
ALT SERPL-CCNC: 46 U/L (ref 8–55)
ANION GAP SERPL CALC-SCNC: 13 MMOL/L (ref 7–16)
AST SERPL-CCNC: 29 U/L (ref 15–37)
BILIRUB SERPL-MCNC: 0.3 MG/DL (ref 0–1.2)
BUN SERPL-MCNC: 14 MG/DL (ref 8–23)
CALCIUM SERPL-MCNC: 9.8 MG/DL (ref 8.8–10.2)
CHLORIDE SERPL-SCNC: 98 MMOL/L (ref 98–107)
CHOLEST SERPL-MCNC: 109 MG/DL (ref 0–200)
CO2 SERPL-SCNC: 26 MMOL/L (ref 20–29)
CREAT SERPL-MCNC: 1.6 MG/DL (ref 0.8–1.3)
EST. AVERAGE GLUCOSE BLD GHB EST-MCNC: 154 MG/DL
GLOBULIN SER CALC-MCNC: 2.9 G/DL (ref 2.3–3.5)
GLUCOSE SERPL-MCNC: 100 MG/DL (ref 70–99)
HBA1C MFR BLD: 7 % (ref 0–5.6)
HDLC SERPL-MCNC: 31 MG/DL (ref 40–60)
HDLC SERPL: 3.5 (ref 0–5)
LDLC SERPL CALC-MCNC: 49 MG/DL (ref 0–100)
POTASSIUM SERPL-SCNC: 4.7 MMOL/L (ref 3.5–5.1)
PROT SERPL-MCNC: 6.6 G/DL (ref 6.3–8.2)
SODIUM SERPL-SCNC: 137 MMOL/L (ref 136–145)
TRIGL SERPL-MCNC: 147 MG/DL (ref 0–150)
VLDLC SERPL CALC-MCNC: 29 MG/DL (ref 6–23)

## 2025-07-28 ASSESSMENT — ENCOUNTER SYMPTOMS
CHEST TIGHTNESS: 0
EYE DISCHARGE: 0
ANAL BLEEDING: 0
FACIAL SWELLING: 0
SINUS PAIN: 0
ABDOMINAL DISTENTION: 0
SINUS PRESSURE: 0
APNEA: 0
COUGH: 0
BACK PAIN: 0
EYE REDNESS: 0
BLOOD IN STOOL: 0
EYE PAIN: 0
BLURRED VISION: 0
EYE ITCHING: 0
RHINORRHEA: 0
ABDOMINAL PAIN: 0

## 2025-07-28 NOTE — PROGRESS NOTES
[facility]  _______________________________________  Bjorn Birmingham MD                 [address] [place] [address]        Nas[place] [address]                                                                                    Phone: [phone]                                                                                    Fax: [phone]    Javid Sharp is a 74 y.o. male who is seen for evaluation of   Chief Complaint   Patient presents with   • Diabetes   • Hypertension   • Cholesterol Problem       HPI:   Diabetes  He presents for his follow-up diabetic visit. He has type 2 diabetes mellitus. Pertinent negatives for hypoglycemia include no confusion, dizziness, headaches, nervousness/anxiousness or pallor. Pertinent negatives for diabetes include no blurred vision, no chest pain, no fatigue, no foot paresthesias, no foot ulcerations, no polydipsia and no polyuria. (on meds, need refills and labs, no side effect noted. \)   Hypertension  This is a chronic problem. The current episode started more than 1 year ago. The problem is unchanged. The problem is controlled. Pertinent negatives include no blurred vision, chest pain or headaches. (on meds, need refills and labs, no side effect noted.   )   Other  Episode onset: prior stroke, no new neuro findings, same residual problems noted , right hemiparesis. Pertinent negatives include no abdominal pain, arthralgias, chest pain, chills, congestion, coughing, diaphoresis, fatigue, fever, headaches, joint swelling, myalgias or rash.    Chief Complaint   Patient presents with   • Diabetes   • Hypertension   • Cholesterol Problem         Review of Systems:    Review of Systems   Constitutional:  Negative for activity change, appetite change, chills, diaphoresis, fatigue and fever.   HENT:  Negative for congestion, ear discharge, ear pain, facial swelling, hearing loss, mouth sores, rhinorrhea, sinus pressure